# Patient Record
Sex: MALE | Race: AMERICAN INDIAN OR ALASKA NATIVE | NOT HISPANIC OR LATINO | URBAN - METROPOLITAN AREA
[De-identification: names, ages, dates, MRNs, and addresses within clinical notes are randomized per-mention and may not be internally consistent; named-entity substitution may affect disease eponyms.]

---

## 2021-05-03 ENCOUNTER — INPATIENT (INPATIENT)
Facility: HOSPITAL | Age: 86
LOS: 9 days | Discharge: NURSING, REHAB OR LONG-TERM CARE | End: 2021-05-13
Attending: STUDENT IN AN ORGANIZED HEALTH CARE EDUCATION/TRAINING PROGRAM | Admitting: STUDENT IN AN ORGANIZED HEALTH CARE EDUCATION/TRAINING PROGRAM
Payer: MEDICARE

## 2021-05-03 VITALS
RESPIRATION RATE: 17 BRPM | HEART RATE: 66 BPM | SYSTOLIC BLOOD PRESSURE: 173 MMHG | OXYGEN SATURATION: 98 % | DIASTOLIC BLOOD PRESSURE: 77 MMHG | TEMPERATURE: 98 F

## 2021-05-03 DIAGNOSIS — R13.10 DYSPHAGIA, UNSPECIFIED: ICD-10-CM
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
## 2021-05-03 DIAGNOSIS — I16.0 HYPERTENSIVE URGENCY: ICD-10-CM

## 2021-05-03 DIAGNOSIS — I68.0 CEREBRAL AMYLOID ANGIOPATHY: ICD-10-CM

## 2021-05-03 DIAGNOSIS — M10.9 GOUT, UNSPECIFIED: ICD-10-CM

## 2021-05-03 DIAGNOSIS — I82.412 ACUTE EMBOLISM AND THROMBOSIS OF LEFT FEMORAL VEIN: ICD-10-CM

## 2021-05-03 DIAGNOSIS — T45.615A ADVERSE EFFECT OF THROMBOLYTIC DRUGS, INITIAL ENCOUNTER: ICD-10-CM

## 2021-05-03 DIAGNOSIS — R47.01 APHASIA: ICD-10-CM

## 2021-05-03 DIAGNOSIS — I10 ESSENTIAL (PRIMARY) HYPERTENSION: ICD-10-CM

## 2021-05-03 DIAGNOSIS — E83.39 OTHER DISORDERS OF PHOSPHORUS METABOLISM: ICD-10-CM

## 2021-05-03 DIAGNOSIS — I82.422 ACUTE EMBOLISM AND THROMBOSIS OF LEFT ILIAC VEIN: ICD-10-CM

## 2021-05-03 DIAGNOSIS — E87.1 HYPO-OSMOLALITY AND HYPONATREMIA: ICD-10-CM

## 2021-05-03 DIAGNOSIS — I63.89 OTHER CEREBRAL INFARCTION: ICD-10-CM

## 2021-05-03 DIAGNOSIS — F05 DELIRIUM DUE TO KNOWN PHYSIOLOGICAL CONDITION: ICD-10-CM

## 2021-05-03 DIAGNOSIS — F43.20 ADJUSTMENT DISORDER, UNSPECIFIED: ICD-10-CM
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
## 2021-05-03 DIAGNOSIS — E83.41 HYPERMAGNESEMIA: ICD-10-CM

## 2021-05-03 DIAGNOSIS — E85.4 ORGAN-LIMITED AMYLOIDOSIS: ICD-10-CM

## 2021-05-03 DIAGNOSIS — I95.9 HYPOTENSION, UNSPECIFIED: ICD-10-CM

## 2021-05-03 DIAGNOSIS — I61.6 NONTRAUMATIC INTRACEREBRAL HEMORRHAGE, MULTIPLE LOCALIZED: ICD-10-CM

## 2021-05-03 DIAGNOSIS — R29.718 NIHSS SCORE 18: ICD-10-CM

## 2021-05-03 DIAGNOSIS — F32.9 MAJOR DEPRESSIVE DISORDER, SINGLE EPISODE, UNSPECIFIED: ICD-10-CM

## 2021-05-03 DIAGNOSIS — E87.0 HYPEROSMOLALITY AND HYPERNATREMIA: ICD-10-CM

## 2021-05-03 DIAGNOSIS — E87.6 HYPOKALEMIA: ICD-10-CM

## 2021-05-03 DIAGNOSIS — G81.91 HEMIPLEGIA, UNSPECIFIED AFFECTING RIGHT DOMINANT SIDE: ICD-10-CM

## 2021-05-03 LAB
ALBUMIN SERPL ELPH-MCNC: 4.2 G/DL — SIGNIFICANT CHANGE UP (ref 3.5–5.2)
ALP SERPL-CCNC: 85 U/L — SIGNIFICANT CHANGE UP (ref 30–115)
ALT FLD-CCNC: 10 U/L — SIGNIFICANT CHANGE UP (ref 0–41)
ANION GAP SERPL CALC-SCNC: 10 MMOL/L — SIGNIFICANT CHANGE UP (ref 7–14)
APTT BLD: 30 SEC — SIGNIFICANT CHANGE UP (ref 27–39.2)
AST SERPL-CCNC: 21 U/L — SIGNIFICANT CHANGE UP (ref 0–41)
BASOPHILS # BLD AUTO: 0.05 K/UL — SIGNIFICANT CHANGE UP (ref 0–0.2)
BASOPHILS NFR BLD AUTO: 0.7 % — SIGNIFICANT CHANGE UP (ref 0–1)
BILIRUB SERPL-MCNC: 0.3 MG/DL — SIGNIFICANT CHANGE UP (ref 0.2–1.2)
BLD GP AB SCN SERPL QL: SIGNIFICANT CHANGE UP
BUN SERPL-MCNC: 15 MG/DL — SIGNIFICANT CHANGE UP (ref 10–20)
CALCIUM SERPL-MCNC: 9.2 MG/DL — SIGNIFICANT CHANGE UP (ref 8.5–10.1)
CHLORIDE SERPL-SCNC: 102 MMOL/L — SIGNIFICANT CHANGE UP (ref 98–110)
CK MB CFR SERPL CALC: 4.1 NG/ML — SIGNIFICANT CHANGE UP (ref 0.6–6.3)
CO2 SERPL-SCNC: 23 MMOL/L — SIGNIFICANT CHANGE UP (ref 17–32)
CREAT SERPL-MCNC: 0.8 MG/DL — SIGNIFICANT CHANGE UP (ref 0.7–1.5)
EOSINOPHIL # BLD AUTO: 0.25 K/UL — SIGNIFICANT CHANGE UP (ref 0–0.7)
EOSINOPHIL NFR BLD AUTO: 3.6 % — SIGNIFICANT CHANGE UP (ref 0–8)
GLUCOSE SERPL-MCNC: 90 MG/DL — SIGNIFICANT CHANGE UP (ref 70–99)
HCT VFR BLD CALC: 36.9 % — LOW (ref 42–52)
HGB BLD-MCNC: 11.4 G/DL — LOW (ref 14–18)
IMM GRANULOCYTES NFR BLD AUTO: 0.3 % — SIGNIFICANT CHANGE UP (ref 0.1–0.3)
INR BLD: 0.95 RATIO — SIGNIFICANT CHANGE UP (ref 0.65–1.3)
LYMPHOCYTES # BLD AUTO: 1.75 K/UL — SIGNIFICANT CHANGE UP (ref 1.2–3.4)
LYMPHOCYTES # BLD AUTO: 25.4 % — SIGNIFICANT CHANGE UP (ref 20.5–51.1)
MCHC RBC-ENTMCNC: 25.1 PG — LOW (ref 27–31)
MCHC RBC-ENTMCNC: 30.9 G/DL — LOW (ref 32–37)
MCV RBC AUTO: 81.3 FL — SIGNIFICANT CHANGE UP (ref 80–94)
MONOCYTES # BLD AUTO: 0.48 K/UL — SIGNIFICANT CHANGE UP (ref 0.1–0.6)
MONOCYTES NFR BLD AUTO: 7 % — SIGNIFICANT CHANGE UP (ref 1.7–9.3)
NEUTROPHILS # BLD AUTO: 4.34 K/UL — SIGNIFICANT CHANGE UP (ref 1.4–6.5)
NEUTROPHILS NFR BLD AUTO: 63 % — SIGNIFICANT CHANGE UP (ref 42.2–75.2)
NRBC # BLD: 0 /100 WBCS — SIGNIFICANT CHANGE UP (ref 0–0)
PLATELET # BLD AUTO: 171 K/UL — SIGNIFICANT CHANGE UP (ref 130–400)
POTASSIUM SERPL-MCNC: 4.5 MMOL/L — SIGNIFICANT CHANGE UP (ref 3.5–5)
POTASSIUM SERPL-SCNC: 4.5 MMOL/L — SIGNIFICANT CHANGE UP (ref 3.5–5)
PROT SERPL-MCNC: 6.8 G/DL — SIGNIFICANT CHANGE UP (ref 6–8)
PROTHROM AB SERPL-ACNC: 10.9 SEC — SIGNIFICANT CHANGE UP (ref 9.95–12.87)
RBC # BLD: 4.54 M/UL — LOW (ref 4.7–6.1)
RBC # FLD: 15.2 % — HIGH (ref 11.5–14.5)
SARS-COV-2 RNA SPEC QL NAA+PROBE: SIGNIFICANT CHANGE UP
SODIUM SERPL-SCNC: 135 MMOL/L — SIGNIFICANT CHANGE UP (ref 135–146)
TROPONIN T SERPL-MCNC: <0.01 NG/ML — SIGNIFICANT CHANGE UP
WBC # BLD: 6.89 K/UL — SIGNIFICANT CHANGE UP (ref 4.8–10.8)
WBC # FLD AUTO: 6.89 K/UL — SIGNIFICANT CHANGE UP (ref 4.8–10.8)

## 2021-05-03 PROCEDURE — 93010 ELECTROCARDIOGRAM REPORT: CPT

## 2021-05-03 PROCEDURE — 71045 X-RAY EXAM CHEST 1 VIEW: CPT | Mod: 26

## 2021-05-03 PROCEDURE — 99222 1ST HOSP IP/OBS MODERATE 55: CPT

## 2021-05-03 PROCEDURE — 70450 CT HEAD/BRAIN W/O DYE: CPT | Mod: 26,MA,76

## 2021-05-03 PROCEDURE — 70496 CT ANGIOGRAPHY HEAD: CPT | Mod: 26,MA

## 2021-05-03 PROCEDURE — 99291 CRITICAL CARE FIRST HOUR: CPT | Mod: CS,GC

## 2021-05-03 PROCEDURE — 70498 CT ANGIOGRAPHY NECK: CPT | Mod: 26,MA

## 2021-05-03 PROCEDURE — 99291 CRITICAL CARE FIRST HOUR: CPT

## 2021-05-03 PROCEDURE — 0042T: CPT

## 2021-05-03 RX ORDER — NICARDIPINE HYDROCHLORIDE 30 MG/1
5 CAPSULE, EXTENDED RELEASE ORAL
Qty: 40 | Refills: 0 | Status: DISCONTINUED | OUTPATIENT
Start: 2021-05-03 | End: 2021-05-04

## 2021-05-03 RX ORDER — LABETALOL HCL 100 MG
20 TABLET ORAL ONCE
Refills: 0 | Status: COMPLETED | OUTPATIENT
Start: 2021-05-03 | End: 2021-05-03

## 2021-05-03 RX ORDER — NICARDIPINE HYDROCHLORIDE 30 MG/1
5 CAPSULE, EXTENDED RELEASE ORAL
Qty: 40 | Refills: 0 | Status: DISCONTINUED | OUTPATIENT
Start: 2021-05-03 | End: 2021-05-03

## 2021-05-03 RX ORDER — ALTEPLASE 100 MG
5.1 KIT INTRAVENOUS ONCE
Refills: 0 | Status: COMPLETED | OUTPATIENT
Start: 2021-05-03 | End: 2021-05-03

## 2021-05-03 RX ORDER — ALTEPLASE 100 MG
45.9 KIT INTRAVENOUS ONCE
Refills: 0 | Status: COMPLETED | OUTPATIENT
Start: 2021-05-03 | End: 2021-05-03

## 2021-05-03 RX ADMIN — ALTEPLASE 45.9 MILLIGRAM(S): KIT at 13:35

## 2021-05-03 RX ADMIN — Medication 20 MILLIGRAM(S): at 13:27

## 2021-05-03 RX ADMIN — NICARDIPINE HYDROCHLORIDE 25 MG/HR: 30 CAPSULE, EXTENDED RELEASE ORAL at 14:34

## 2021-05-03 RX ADMIN — NICARDIPINE HYDROCHLORIDE 25 MG/HR: 30 CAPSULE, EXTENDED RELEASE ORAL at 19:09

## 2021-05-03 RX ADMIN — ALTEPLASE 306 MILLIGRAM(S): KIT at 13:23

## 2021-05-03 NOTE — H&P ADULT - NSHPLABSRESULTS_GEN_ALL_CORE
LABS:                        11.4   6.89  )-----------( 171      ( 03 May 2021 13:12 )             36.9     05-03    135  |  102  |  15  ----------------------------<  90  4.5   |  23  |  0.8    Ca    9.2      03 May 2021 13:12    TPro  6.8  /  Alb  4.2  /  TBili  0.3  /  DBili  x   /  AST  21  /  ALT  10  /  AlkPhos  85  05-03    PT/INR - ( 03 May 2021 13:12 )   PT: 10.90 sec;   INR: 0.95 ratio         PTT - ( 03 May 2021 13:12 )  PTT:30.0 sec  < from: CT Angio Neck w/ IV Cont (05.03.21 @ 14:10) >    IMPRESSION:    1.  No evidence of acute large vessel occlusion. Normal perfusion images.    2.  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    3.  Moderate (about 60% stenosis) of the right ICA origin.    4.  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          < end of copied text >    RA< from: CT Brain Stroke Protocol (05.03.21 @ 13:17) >    IMPRESSION:    1.  No acute intracranial hemorrhage or acute large territorial infarct.    2.  Cerebral and cerebellar atrophy.    3.  Periventricular white matter hypodensities, nonspecific, differential diagnostic possibilities include ischemic change, gliosis or demyelination.      < end of copied text >

## 2021-05-03 NOTE — H&P ADULT - NSHPPHYSICALEXAM_GEN_ALL_CORE
PHYSICAL EXAM:  GENERAL: NAD, lying in bed comfortably  HEAD:  Atraumatic, Normocephalic  EYES: EOMI, PERRLA, conjunctiva and sclera clear  ENT: Moist mucous membranes  NECK: Supple, No JVD  CHEST/LUNG: Clear to auscultation bilaterally; No rales, rhonchi, wheezing, or rubs. Unlabored respirations  HEART: Regular rate and rhythm; No murmurs, rubs, or gallops  ABDOMEN: Bowel sounds present; Soft, Nontender, Nondistended. No hepatomegaly  NERVOUS SYSTEM:  Alert. Total aphasia, loss of sensation and motor power on right side ( face and extremities)

## 2021-05-03 NOTE — ED PROVIDER NOTE - OBJECTIVE STATEMENT
86M With PMH of gout presents as a stroke code BIBEMS. Patient is with granddaughter, is Gujar[place] speaking, which I am able to speak. As per patient and family, patient was well appearing till 11:30Am this morning, when it was noted that he had one sock on not the other and when trying to eat he would miss his mouth, AMS. Patient otherwise not answering questions in the hospital.

## 2021-05-03 NOTE — CONSULT NOTE ADULT - SUBJECTIVE AND OBJECTIVE BOX
Neurology Consult    Patient is a 86y old  Male who presents with a chief complaint of aphasia    HPI:  86 year old right handed gentleman presents to the ED with his granddaughter. Patient last known well at 1130. Patient found to be aphasic with right sided weakness at 12.   stroke code and code NI called on arrival. Patient with NIHSS 18. CTH without intracranial pathology. Risks and benfits of IV tpa given to granddaughter and she agreed to accept it. Patient given IV TPA.    PAST MEDICAL & SURGICAL HISTORY:  Gout        FAMILY HISTORY:      Social History: (-) x 3    Allergies    No Known Allergies    Intolerances        MEDICATIONS  (STANDING):  niCARdipine Infusion 5 mG/Hr (25 mL/Hr) IV Continuous <Continuous>    MEDICATIONS  (PRN):  Vital Signs Last 24 Hrs  T(C): 36.4 (03 May 2021 13:15), Max: 36.4 (03 May 2021 13:15)  T(F): 97.5 (03 May 2021 13:15), Max: 97.5 (03 May 2021 13:15)  HR: 86 (03 May 2021 14:01) (62 - 86)  BP: 157/71 (03 May 2021 14:01) (127/67 - 225/97)  BP(mean): --  RR: 17 (03 May 2021 13:15) (17 - 17)  SpO2: 98% (03 May 2021 13:15) (98% - 98%)    Examination:    NIH Stroke Scale:   · NIH Stroke Scale: LOC	(0) Alert; keenly responsive  · NIH Stroke Scale: LOC Question	(2) Answers neither question correctly  · NIH Stroke Scale: LOC Command	(2) Performs neither task correctly  · NIH Stroke Scale: Gaze	(0) Normal  · NIH Stroke Scale: Visual	(1) Partial hemianopia  · NIH Stroke Scale: Facial	(0) Normal symmetrical movements  · NIH Stroke Scale: Arm Left	(0) No drift; limb holds 90 (or 45) degrees for full 10 secs  · NIH Stroke Scale: Arm Right	(4) No movement  · NIH Stroke Scale: Leg Left	(0) No drift; leg holds 30 degree position for full 5 secs  · NIH Stroke Scale: Leg Right	(4) No movement  · NIH Stroke Scale: Ataxia	(0) Absent  · NIH Stroke Scale: Sensory	(2) Severe to total sensory loss; patient is not aware of being touched in the face, arm, and leg  · NIH Stroke Scale: Language	(3) Mute, global aphasia; no usable speech or auditory comprehension  · NIH Stroke Scale: Dysarthria	(0) Normal  · NIH Stroke Scale: Extinct Inattention	(0) No abnormality  · NIH Stroke Scale: Total	18     Pre-stroke mRS Score:  mRS Score	(0) No symptoms        Labs:   CBC Full  -  ( 03 May 2021 13:12 )  WBC Count : 6.89 K/uL  RBC Count : 4.54 M/uL  Hemoglobin : 11.4 g/dL  Hematocrit : 36.9 %  Platelet Count - Automated : 171 K/uL  Mean Cell Volume : 81.3 fL  Mean Cell Hemoglobin : 25.1 pg  Mean Cell Hemoglobin Concentration : 30.9 g/dL  Auto Neutrophil # : 4.34 K/uL  Auto Lymphocyte # : 1.75 K/uL  Auto Monocyte # : 0.48 K/uL  Auto Eosinophil # : 0.25 K/uL  Auto Basophil # : 0.05 K/uL  Auto Neutrophil % : 63.0 %  Auto Lymphocyte % : 25.4 %  Auto Monocyte % : 7.0 %  Auto Eosinophil % : 3.6 %  Auto Basophil % : 0.7 %    05-03    135  |  102  |  15  ----------------------------<  90  4.5   |  23  |  0.8    Ca    9.2      03 May 2021 13:12    TPro  6.8  /  Alb  4.2  /  TBili  0.3  /  DBili  x   /  AST  21  /  ALT  10  /  AlkPhos  85  05-03    LIVER FUNCTIONS - ( 03 May 2021 13:12 )  Alb: 4.2 g/dL / Pro: 6.8 g/dL / ALK PHOS: 85 U/L / ALT: 10 U/L / AST: 21 U/L / GGT: x           PT/INR - ( 03 May 2021 13:12 )   PT: 10.90 sec;   INR: 0.95 ratio         PTT - ( 03 May 2021 13:12 )  PTT:30.0 sec        Neuroimaging:  NCHCT: CT Head No Cont:   EXAM:  CT BRAIN        IMPRESSION:    In comparison with the prior CT scan of the brain performed earlier the same day:    No acute intracranial hemorrhage.    Stable examination.    < from: CT Angio Head w/ IV Cont (05.03.21 @ 14:10) >  IMPRESSION:    1.  No evidence of acute large vessel occlusion. Normal perfusion images.    2.  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    3.  Moderate (about 60% stenosis) of the right ICA origin.    4.  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      `< from: CT Perfusion w/ Maps w/ IV Cont (05.03.21 @ 13:56) >    IMPRESSION:    1.  No evidence of acute large vessel occlusion. Normal perfusion images.    2.  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    3.  Moderate (about 60% stenosis) of the right ICA origin.    4.  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        < end of copied text >

## 2021-05-03 NOTE — H&P ADULT - ATTENDING COMMENTS
Stroke sp tpa    - Neuro f/up  - repeat head in 24 h or sooner if any changes in neuro status  - BP control  - MICU

## 2021-05-03 NOTE — H&P ADULT - ASSESSMENT
# Acute ischemic stroke  - NIH score 18   - CTA and CTP showed no LVO  - s/p tpA on 13:23  -Off nicardipine drip  - Goal BP <180/105 mmHg  - No antiplatelet or antithrombotic med for 24 hrs  - MRI brain without luis alberto  - TTE  - Lipid panel and A1C  - Video EEG,Seizure precautions.    # Acute ischemic stroke  - NIH score 18   - CTA and CTP showed no LVO  - s/p tpA on 13:23  -Off nicardipine drip  - Goal BP <180/105 mmHg  - No antiplatelet or antithrombotic med for 24 hrs  - MRI brain without luis alberto  - TTE  - Lipid panel and A1C  - Video EEG,Seizure precautions.   - CT head in 24 hrs after tpA Impression      Acute ischemic stroke  - NIH score 18   - CTA and CTP showed no LVO  - s/p tpA on 13:23  -Off nicardipine drip  - Goal BP <180/105 mmHg  - No antiplatelet or antithrombotic med for 24 hrs  - MRI brain without luis alberto  - TTE  - Lipid panel and A1C  - Video EEG,Seizure precautions.   - CT head in 24 hrs after tpA

## 2021-05-03 NOTE — ED PROVIDER NOTE - ATTENDING CONTRIBUTION TO CARE
I personally evaluated patient. I agree with the findings and plan with all documentation on chart except as documented  in my note.    85 y/o M PMH Gout who presents to the ED with his granddaughter. Patient last known well at 1130 am and was brought in for aphasia with right sided weakness since 12pm. Stroke code and code NI called on arrival. No recent falls or trauma. Patient with NIHSS 18 with right sided mariama-neglect and hemiparesis. Patient rushed for CT head and IV t-PA prepared. CTH without intracranial pathology. Risks and benfits of IV tpa given to granddaughter with ED and Neuro team.  She agreed to accept it. Patient given IV TPA. Patient rushed back to CT scan for CTA/Perfusion studies. No large vessel occlusion found and patient therefore not a candidate for IA intervention.   Patient had transient hypotension and was treated with IV Labetalol and BP improved. Patient had multiple neuro exams and appears to be moving right side slightly (improved form nothing). ICU consulted and will admit to ICU for further work up, neuro checks, and proper treatment/resuscitation. ED work up reviewed. Family spoken to in detail about results and plan of care.  All questions addressed.  Results of ED work up discussed and they are aware patient requires admission for further treatment and work up.

## 2021-05-03 NOTE — ED PROVIDER NOTE - PHYSICAL EXAMINATION
VITALS: Reviewed  CONSTITUTIONAL: not responding appropriately to questions, Altered  SKIN: warm, dry, no rash  HEAD: normocephalic, atraumatic  EYES: PERRL, EOMI, no conjunctival erythema, sclera clear  ENT: patent airway, moist mucous membranes  NECK: supple, no masses  CV:  regular rate, regular rhythm, 2+ radial pulses bilaterally  RESP: no wheezes, no rales, no rhonchi, normal work of breathing  ABD: soft, nontender, nondistended, no rebound, no guarding  MSK: normal ROM, no cyanosis, no edema  NEURO: AAOx0, not following commands, responsive to pain, NIH stroke scale 18  PSYCH: cooperative, appropriate

## 2021-05-03 NOTE — ED ADULT TRIAGE NOTE - CODE STROKE DETAILS
EMS stroke notification received @ 13:01 for an 87 year old male w/ altered mental status, right arm and right leg weakness, aphasia. FSBS 117 in the field. Code stroke activated PTA.

## 2021-05-03 NOTE — ED PROVIDER NOTE - PROGRESS NOTE DETAILS
PP: Patient NI alert, given TPA, blood pressure elevated 225, given labetalol, repeat pressure now 155/69. PP: Patient admitted to ICU.

## 2021-05-03 NOTE — ED PROVIDER NOTE - CLINICAL SUMMARY MEDICAL DECISION MAKING FREE TEXT BOX
87 y/o M PMH Gout who presents to the ED with his granddaughter. Patient last known well at 1130 am and was brought in for aphasia with right sided weakness since 12pm. Stroke code and code NI called on arrival. No recent falls or trauma. Patient with NIHSS 18 with right sided mariama-neglect and hemiparesis. Patient rushed for CT head and IV t-PA prepared. CTH without intracranial pathology. Risks and benfits of IV tpa given to granddaughter with ED and Neuro team.  She agreed to accept it. Patient given IV TPA. Patient rushed back to CT scan for CTA/Perfusion studies. No large vessel occlusion found and patient therefore not a candidate for IA intervention.   Patient had transient hypotension and was treated with IV Labetalol and BP improved. Patient had multiple neuro exams and appears to be moving right side slightly (improved form nothing). ICU consulted and will admit to ICU for further work up, neuro checks, and proper treatment/resuscitation. ED work up reviewed. Family spoken to in detail about results and plan of care.  All questions addressed.  Results of ED work up discussed and they are aware patient requires admission for further treatment and work up.

## 2021-05-03 NOTE — H&P ADULT - HISTORY OF PRESENT ILLNESS
86 year old right handed gentleman with no significant PMH presented to the ED with his granddaughter. Patient last known well at 11:30 AM. Patient found to be aphasic with right sided weakness at around noon.   Stroke code and code NI called on arrival. Patient with NIHSS 18. CTH without intracranial pathology.  Patient given IV TPA at 13:23. He was hypertensive on presentation () and was given Labetalol 20 mg IVP and started on nicardipine drip (now off)  86 year old right handed gentleman with no significant PMH presented to the ED with his granddaughter. Patient last known well at 11:30 AM. Patient found to be aphasic with right sided weakness at around noon.   Stroke code and code NI called on arrival. Patient with NIHSS 18. CTH without intracranial pathology.  Patient given IV TPA at 13:23. He was hypertensive on presentation () and was given Labetalol 20 mg IVP and started on nicardipine drip (now off) called for MICU

## 2021-05-04 LAB
A1C WITH ESTIMATED AVERAGE GLUCOSE RESULT: 6.2 % — HIGH (ref 4–5.6)
ALBUMIN SERPL ELPH-MCNC: 4.3 G/DL — SIGNIFICANT CHANGE UP (ref 3.5–5.2)
ALP SERPL-CCNC: 86 U/L — SIGNIFICANT CHANGE UP (ref 30–115)
ALT FLD-CCNC: 10 U/L — SIGNIFICANT CHANGE UP (ref 0–41)
ANION GAP SERPL CALC-SCNC: 11 MMOL/L — SIGNIFICANT CHANGE UP (ref 7–14)
AST SERPL-CCNC: 23 U/L — SIGNIFICANT CHANGE UP (ref 0–41)
BASOPHILS # BLD AUTO: 0.03 K/UL — SIGNIFICANT CHANGE UP (ref 0–0.2)
BASOPHILS NFR BLD AUTO: 0.4 % — SIGNIFICANT CHANGE UP (ref 0–1)
BILIRUB SERPL-MCNC: 0.5 MG/DL — SIGNIFICANT CHANGE UP (ref 0.2–1.2)
BLD GP AB SCN SERPL QL: SIGNIFICANT CHANGE UP
BUN SERPL-MCNC: 16 MG/DL — SIGNIFICANT CHANGE UP (ref 10–20)
CALCIUM SERPL-MCNC: 9.5 MG/DL — SIGNIFICANT CHANGE UP (ref 8.5–10.1)
CHLORIDE SERPL-SCNC: 99 MMOL/L — SIGNIFICANT CHANGE UP (ref 98–110)
CHOLEST SERPL-MCNC: 195 MG/DL — SIGNIFICANT CHANGE UP
CO2 SERPL-SCNC: 24 MMOL/L — SIGNIFICANT CHANGE UP (ref 17–32)
COVID-19 SPIKE DOMAIN AB INTERP: POSITIVE
COVID-19 SPIKE DOMAIN ANTIBODY RESULT: >250 U/ML — HIGH
CREAT SERPL-MCNC: 0.9 MG/DL — SIGNIFICANT CHANGE UP (ref 0.7–1.5)
EOSINOPHIL # BLD AUTO: 0.03 K/UL — SIGNIFICANT CHANGE UP (ref 0–0.7)
EOSINOPHIL NFR BLD AUTO: 0.4 % — SIGNIFICANT CHANGE UP (ref 0–8)
ESTIMATED AVERAGE GLUCOSE: 131 MG/DL — HIGH (ref 68–114)
GLUCOSE SERPL-MCNC: 111 MG/DL — HIGH (ref 70–99)
HCT VFR BLD CALC: 37.9 % — LOW (ref 42–52)
HDLC SERPL-MCNC: 48 MG/DL — SIGNIFICANT CHANGE UP
HGB BLD-MCNC: 12 G/DL — LOW (ref 14–18)
IMM GRANULOCYTES NFR BLD AUTO: 0.3 % — SIGNIFICANT CHANGE UP (ref 0.1–0.3)
LIPID PNL WITH DIRECT LDL SERPL: 133 MG/DL — HIGH
LYMPHOCYTES # BLD AUTO: 1.43 K/UL — SIGNIFICANT CHANGE UP (ref 1.2–3.4)
LYMPHOCYTES # BLD AUTO: 17.9 % — LOW (ref 20.5–51.1)
MCHC RBC-ENTMCNC: 25.3 PG — LOW (ref 27–31)
MCHC RBC-ENTMCNC: 31.7 G/DL — LOW (ref 32–37)
MCV RBC AUTO: 80 FL — SIGNIFICANT CHANGE UP (ref 80–94)
MONOCYTES # BLD AUTO: 0.55 K/UL — SIGNIFICANT CHANGE UP (ref 0.1–0.6)
MONOCYTES NFR BLD AUTO: 6.9 % — SIGNIFICANT CHANGE UP (ref 1.7–9.3)
NEUTROPHILS # BLD AUTO: 5.93 K/UL — SIGNIFICANT CHANGE UP (ref 1.4–6.5)
NEUTROPHILS NFR BLD AUTO: 74.1 % — SIGNIFICANT CHANGE UP (ref 42.2–75.2)
NON HDL CHOLESTEROL: 147 MG/DL — HIGH
NRBC # BLD: 0 /100 WBCS — SIGNIFICANT CHANGE UP (ref 0–0)
PLATELET # BLD AUTO: 181 K/UL — SIGNIFICANT CHANGE UP (ref 130–400)
POTASSIUM SERPL-MCNC: 4.6 MMOL/L — SIGNIFICANT CHANGE UP (ref 3.5–5)
POTASSIUM SERPL-SCNC: 4.6 MMOL/L — SIGNIFICANT CHANGE UP (ref 3.5–5)
PROT SERPL-MCNC: 7 G/DL — SIGNIFICANT CHANGE UP (ref 6–8)
RBC # BLD: 4.74 M/UL — SIGNIFICANT CHANGE UP (ref 4.7–6.1)
RBC # FLD: 15 % — HIGH (ref 11.5–14.5)
SARS-COV-2 IGG+IGM SERPL QL IA: >250 U/ML — HIGH
SARS-COV-2 IGG+IGM SERPL QL IA: POSITIVE
SODIUM SERPL-SCNC: 134 MMOL/L — LOW (ref 135–146)
TRIGL SERPL-MCNC: 129 MG/DL — SIGNIFICANT CHANGE UP
WBC # BLD: 7.99 K/UL — SIGNIFICANT CHANGE UP (ref 4.8–10.8)
WBC # FLD AUTO: 7.99 K/UL — SIGNIFICANT CHANGE UP (ref 4.8–10.8)

## 2021-05-04 PROCEDURE — 70553 MRI BRAIN STEM W/O & W/DYE: CPT | Mod: 26

## 2021-05-04 PROCEDURE — 99232 SBSQ HOSP IP/OBS MODERATE 35: CPT

## 2021-05-04 PROCEDURE — 70450 CT HEAD/BRAIN W/O DYE: CPT | Mod: 26,77

## 2021-05-04 PROCEDURE — 93306 TTE W/DOPPLER COMPLETE: CPT | Mod: 26

## 2021-05-04 PROCEDURE — 99291 CRITICAL CARE FIRST HOUR: CPT

## 2021-05-04 PROCEDURE — 95816 EEG AWAKE AND DROWSY: CPT | Mod: 26

## 2021-05-04 PROCEDURE — 70450 CT HEAD/BRAIN W/O DYE: CPT | Mod: 26

## 2021-05-04 RX ORDER — LABETALOL HCL 100 MG
10 TABLET ORAL ONCE
Refills: 0 | Status: COMPLETED | OUTPATIENT
Start: 2021-05-04 | End: 2021-05-04

## 2021-05-04 RX ORDER — SODIUM CHLORIDE 9 MG/ML
500 INJECTION INTRAMUSCULAR; INTRAVENOUS; SUBCUTANEOUS ONCE
Refills: 0 | Status: COMPLETED | OUTPATIENT
Start: 2021-05-04 | End: 2021-05-04

## 2021-05-04 RX ORDER — LEVETIRACETAM 250 MG/1
500 TABLET, FILM COATED ORAL EVERY 12 HOURS
Refills: 0 | Status: DISCONTINUED | OUTPATIENT
Start: 2021-05-04 | End: 2021-05-12

## 2021-05-04 RX ORDER — PANTOPRAZOLE SODIUM 20 MG/1
40 TABLET, DELAYED RELEASE ORAL ONCE
Refills: 0 | Status: COMPLETED | OUTPATIENT
Start: 2021-05-04 | End: 2021-05-04

## 2021-05-04 RX ORDER — CHLORHEXIDINE GLUCONATE 213 G/1000ML
1 SOLUTION TOPICAL
Refills: 0 | Status: DISCONTINUED | OUTPATIENT
Start: 2021-05-04 | End: 2021-05-12

## 2021-05-04 RX ORDER — SODIUM CHLORIDE 9 MG/ML
1000 INJECTION INTRAMUSCULAR; INTRAVENOUS; SUBCUTANEOUS
Refills: 0 | Status: DISCONTINUED | OUTPATIENT
Start: 2021-05-04 | End: 2021-05-05

## 2021-05-04 RX ORDER — PANTOPRAZOLE SODIUM 20 MG/1
40 TABLET, DELAYED RELEASE ORAL DAILY
Refills: 0 | Status: DISCONTINUED | OUTPATIENT
Start: 2021-05-05 | End: 2021-05-08

## 2021-05-04 RX ORDER — NICARDIPINE HYDROCHLORIDE 30 MG/1
5 CAPSULE, EXTENDED RELEASE ORAL
Qty: 40 | Refills: 0 | Status: DISCONTINUED | OUTPATIENT
Start: 2021-05-04 | End: 2021-05-09

## 2021-05-04 RX ADMIN — SODIUM CHLORIDE 500 MILLILITER(S): 9 INJECTION INTRAMUSCULAR; INTRAVENOUS; SUBCUTANEOUS at 15:46

## 2021-05-04 RX ADMIN — NICARDIPINE HYDROCHLORIDE 25 MG/HR: 30 CAPSULE, EXTENDED RELEASE ORAL at 21:18

## 2021-05-04 RX ADMIN — Medication 10 MILLIGRAM(S): at 12:19

## 2021-05-04 RX ADMIN — SODIUM CHLORIDE 75 MILLILITER(S): 9 INJECTION INTRAMUSCULAR; INTRAVENOUS; SUBCUTANEOUS at 22:51

## 2021-05-04 RX ADMIN — SODIUM CHLORIDE 75 MILLILITER(S): 9 INJECTION INTRAMUSCULAR; INTRAVENOUS; SUBCUTANEOUS at 21:18

## 2021-05-04 RX ADMIN — LEVETIRACETAM 420 MILLIGRAM(S): 250 TABLET, FILM COATED ORAL at 18:04

## 2021-05-04 RX ADMIN — SODIUM CHLORIDE 500 MILLILITER(S): 9 INJECTION INTRAMUSCULAR; INTRAVENOUS; SUBCUTANEOUS at 12:19

## 2021-05-04 RX ADMIN — SODIUM CHLORIDE 2000 MILLILITER(S): 9 INJECTION INTRAMUSCULAR; INTRAVENOUS; SUBCUTANEOUS at 22:52

## 2021-05-04 NOTE — PROGRESS NOTE ADULT - SUBJECTIVE AND OBJECTIVE BOX
OVERNIGHT EVENTS: events noted, still aphasic, no drips    Vital Signs Last 24 Hrs  T(C): 36.7 (04 May 2021 08:00), Max: 36.7 (04 May 2021 08:00)  T(F): 98 (04 May 2021 08:00), Max: 98 (04 May 2021 08:00)  HR: 74 (04 May 2021 08:00) (62 - 92)  BP: 177/73 (04 May 2021 06:00) (127/64 - 225/97)  BP(mean): 100 (04 May 2021 06:00) (74 - 108)  RR: 30 (04 May 2021 08:00) (17 - 50)  SpO2: 99% (04 May 2021 08:00) (98% - 100%)    PHYSICAL EXAMINATION:    GENERAL: ill looking    HEENT: Head is normocephalic and atraumatic.     NECK: Supple.    LUNGS: Clear to auscultation without wheezing, rales or rhonchi; respirations unlabored    HEART: Regular rate and rhythm without murmur.    ABDOMEN: Soft, nontender, and nondistended.      EXTREMITIES: Without any cyanosis, clubbing, rash, lesions or edema.    NEUROLOGIC aphasic, non focal  SKIN: No ulceration or induration present.      LABS:                        12.0   7.99  )-----------( 181      ( 04 May 2021 04:45 )             37.9     05-04    134<L>  |  99  |  16  ----------------------------<  111<H>  4.6   |  24  |  0.9    Ca    9.5      04 May 2021 04:45    TPro  7.0  /  Alb  4.3  /  TBili  0.5  /  DBili  x   /  AST  23  /  ALT  10  /  AlkPhos  86  05-04    PT/INR - ( 03 May 2021 13:12 )   PT: 10.90 sec;   INR: 0.95 ratio         PTT - ( 03 May 2021 13:12 )  PTT:30.0 sec      CARDIAC MARKERS ( 03 May 2021 13:12 )  x     / <0.01 ng/mL / x     / x     / 4.1 ng/mL                  05-03-21 @ 07:01  -  05-04-21 @ 07:00  --------------------------------------------------------  IN: 22.5 mL / OUT: 2950 mL / NET: -2927.5 mL        MICROBIOLOGY:      MEDICATIONS  (STANDING):  chlorhexidine 4% Liquid 1 Application(s) Topical <User Schedule>  niCARdipine Infusion 5 mG/Hr (25 mL/Hr) IV Continuous <Continuous>    MEDICATIONS  (PRN):      RADIOLOGY & ADDITIONAL STUDIES:

## 2021-05-04 NOTE — PROGRESS NOTE ADULT - SUBJECTIVE AND OBJECTIVE BOX
Specialty: Neuro Critical Care     Interval Hx: No acute events overnight. pt remains aphasic.      HPI:  86 year old right handed gentleman with no significant PMH presented to the ED with his granddaughter. Patient last known well at 11:30 AM. Patient found to be aphasic with right sided weakness at around noon.   Stroke code and code NI called on arrival. Patient with NIHSS 18. CTH without intracranial pathology.  Patient given IV TPA at 13:23. He was hypertensive on presentation () and was given Labetalol 20 mg IVP and started on nicardipine drip (now off) called for MICU  (03 May 2021 16:17)      Past Medical and Surgical Hx:  PAST MEDICAL & SURGICAL HISTORY:  Gout  not on treatment, no recent flares    No significant past surgical history        Family Hx:  FAMILY HISTORY:  FHx: stroke (Sibling)        Allergies: No Known Allergies      ROS:   Patient unable to participate in ROS due to neurologic status      Medications Current and PRN:  MEDICATIONS  (STANDING):  chlorhexidine 4% Liquid 1 Application(s) Topical <User Schedule>  sodium chloride 0.9% Bolus 500 milliLiter(s) IV Bolus once  sodium chloride 0.9%. 1000 milliLiter(s) (75 mL/Hr) IV Continuous <Continuous>    MEDICATIONS  (PRN):      Vital Signs:  ICU Vital Signs Last 24 Hrs  T(C): 36.7 (04 May 2021 08:00), Max: 36.7 (04 May 2021 08:00)  T(F): 98 (04 May 2021 08:00), Max: 98 (04 May 2021 08:00)  HR: 62 (04 May 2021 11:00) (62 - 92)  BP: 188/79 (04 May 2021 11:00) (127/64 - 225/97)  BP(mean): 102 (04 May 2021 11:00) (74 - 108)  ABP: --  ABP(mean): --  RR: 25 (04 May 2021 11:00) (17 - 50)  SpO2: 100% (04 May 2021 11:00) (98% - 100%)      I/Os:  I&O's Summary    03 May 2021 07:01  -  04 May 2021 07:00  --------------------------------------------------------  IN: 22.5 mL / OUT: 2950 mL / NET: -2927.5 mL    04 May 2021 07:01  -  04 May 2021 11:45  --------------------------------------------------------  IN: 725 mL / OUT: 245 mL / NET: 480 mL      PE:  Gen: WN/WD male sitting upright in bed, in NAD  Mental status: Awake, alert. Global aphasia. Does not follow commands  Cranial nerves: PERRL, no nystagmus, L gaze preference. (+) blink to threat. Face grossly symmetric.  Motor:  Moves b/l UEs antigravity, slowly lowers to bed. Moves b/l LEs within the plane of the bed  Sensation: Grimaces to noxious stimuli x4 extremities      Labs:  05-04    134<L>  |  99  |  16  ----------------------------<  111<H>  4.6   |  24  |  0.9    Ca    9.5      04 May 2021 04:45    TPro  7.0  /  Alb  4.3  /  TBili  0.5  /  DBili  x   /  AST  23  /  ALT  10  /  AlkPhos  86  05-04                          12.0   7.99  )-----------( 181      ( 04 May 2021 04:45 )             37.9     PT/INR - ( 03 May 2021 13:12 )   PT: 10.90 sec;   INR: 0.95 ratio         PTT - ( 03 May 2021 13:12 )  PTT:30.0 sec  LIVER FUNCTIONS - ( 04 May 2021 04:45 )  Alb: 4.3 g/dL / Pro: 7.0 g/dL / ALK PHOS: 86 U/L / ALT: 10 U/L / AST: 23 U/L / GGT: x             Lipid Profile in AM (05.04.21 @ 04:45)   Cholesterol, Serum: 195 mg/dL   Triglycerides, Serum: 129 mg/dL   HDL Cholesterol, Serum: 48 mg/dL   Non HDL Cholesterol: 147 mg/dL   LDL Cholesterol Calculated: 133 mg/dL     A1C with Estimated Average Glucose in AM (05.04.21 @ 04:30)   A1C with Estimated Average Glucose Result: 6.2      Radiology:  CT Head No Cont:   EXAM:  CT BRAIN            PROCEDURE DATE:  05/03/2021        INTERPRETATION:  Clinical History / Reason for exam: Status post TPA, hypertension.    CT SCAN OF THE BRAIN WITHOUT CONTRAST    TECHNIQUE:    Multiple transaxial noncontrast CT images of the brain were obtained from base to vertex. Sagittal and coronal reformatted images were obtained.    COMPARISON:    Noncontrast CT scan of the brain dated May 3, 2021 time 1:13 PM.    FINDINGS:    The third and lateral ventricles are mildly enlarged as are the cortical sulci consistent with a mild degree of cortical atrophy.  The fourth ventricle is normal in size and position.    There is no shift of the midline structures, evidence of acute intracranial hemorrhage, or depressed skull fracture.    Minimal widening of the cerebellopontine angle cisterns and minimal prominence of the cerebellar folia consistent with a minimal degree of cerebellar atrophy.    Patchy foci of diminished attenuation in the periventricular white matter. Thesefindings are nonspecific in appearance and differential diagnostic possibilities include ischemic change of undetermined age, foci of gliosis or demyelination.    Vascular calcifications are noted.    Sclerosis of the left mastoid tip consistent withchronic inflammatory changes.    Mucosal thickening in the frontal and ethmoid sinuses.    Right medial scleral calcification.    IMPRESSION:    In comparison with the prior CT scan of the brain performed earlier the same day:    No acute intracranial hemorrhage.    Stable examination.    Spoke with TIO MCNEILL NP on 5/3/2021 1:52 PM with readback      MADELEINE OCAMPO MD; Attending Radiologist  This document has been electronically signed. May  3 2021  1:58PM (05-03-21 @ 13:49)        CT Perfusion w/ Maps w/ IV Cont:   EXAM:  CT ANGIO BRAIN (W)AW IC        EXAM:  CT ANGIO NECK (W)AW IC        EXAM:  CT PERFUSION W MAPS IC          PROCEDURE DATE:  05/03/2021        INTERPRETATION:  Clinical History / Reason for exam: Stroke code. Right-sided weakness.    Technique: CT angiogram of the head and neck including perfusion study of the brain.  Contiguous CT axial images of the head and neck were obtained following the bolus intravenous administration of 120 cc Optiray with multiple 3-D and MIP reformats withperfusion mapping performed on a separate 3D workstation (RAPID).    Correlation made with accompanying noncontrast head CT.    Findings:    CTA neck:  The visualized aortic arch and great vessel origins demonstrated extensive calcific plaque withoutsignificant stenosis.    The right common carotid artery is patent. There is calcific plaque at the right carotid bifurcation with about 60% stenosis of the carotid bulb. The ECA origin is patent.    The left common carotid artery is patent. There iscalcific plaque at the left ICA origin without significant stenosis.    There are moderate to severe stenoses of bilateral vertebral artery origins. The vertebral arteries are otherwise patent throughout their cervical courses, dominant on the right.    CTA brain: There is calcific plaque of the carotid siphons with mild stenosis on the right and moderate stenosis on the left. The anterior and middle cerebral arteries are patent.    The distal right vertebral artery and demonstrates mild stenoses. The left vertebral artery is diminutive beyond the PICA origin. The basilar artery is patent with a slightly bulbous configuration of the tip without discrete saccular aneurysm. The posterior cerebral arteries are patent.    The venous sinuses are patent.    Perfusion: Perfusion images are normal.    Other: Cervical spine degenerative changes. Subcentimeter bilateral thyroid nodules. Scattered dental caries and periapical lucencies    IMPRESSION:    1.  No evidence of acute large vessel occlusion. Normal perfusion images.    2.  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    3.  Moderate (about 60% stenosis) of the right ICA origin.    4.  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        MARIA LUISA JARQUIN MD; Attending Radiologist  This document has been electronically signed. May  3 2021  2:17PM (05-03-21 @ 13:56)   Specialty: Neuro Critical Care     Interval Hx: No acute events overnight. pt remains aphasic.      HPI:  86 year old right handed gentleman with no significant PMH presented to the ED with his granddaughter. Patient last known well at 11:30 AM. Patient found to be aphasic with right sided weakness at around noon.   Stroke code and code NI called on arrival. Patient with NIHSS 18. CTH without intracranial pathology.  Patient given IV TPA at 13:23. He was hypertensive on presentation () and was given Labetalol 20 mg IVP and started on nicardipine drip (now off) called for MICU  (03 May 2021 16:17)      Past Medical and Surgical Hx:  PAST MEDICAL & SURGICAL HISTORY:  Gout  not on treatment, no recent flares    No significant past surgical history        Family Hx:  FAMILY HISTORY:  FHx: stroke (Sibling)        Allergies: No Known Allergies      ROS:   Patient unable to participate in ROS due to neurologic status      Medications Current and PRN:  MEDICATIONS  (STANDING):  chlorhexidine 4% Liquid 1 Application(s) Topical <User Schedule>  sodium chloride 0.9% Bolus 500 milliLiter(s) IV Bolus once  sodium chloride 0.9%. 1000 milliLiter(s) (75 mL/Hr) IV Continuous <Continuous>    MEDICATIONS  (PRN):      Vital Signs:  ICU Vital Signs Last 24 Hrs  T(C): 36.7 (04 May 2021 08:00), Max: 36.7 (04 May 2021 08:00)  T(F): 98 (04 May 2021 08:00), Max: 98 (04 May 2021 08:00)  HR: 62 (04 May 2021 11:00) (62 - 92)  BP: 188/79 (04 May 2021 11:00) (127/64 - 225/97)  BP(mean): 102 (04 May 2021 11:00) (74 - 108)  ABP: --  ABP(mean): --  RR: 25 (04 May 2021 11:00) (17 - 50)  SpO2: 100% (04 May 2021 11:00) (98% - 100%)      I/Os:  I&O's Summary    03 May 2021 07:01  -  04 May 2021 07:00  --------------------------------------------------------  IN: 22.5 mL / OUT: 2950 mL / NET: -2927.5 mL    04 May 2021 07:01  -  04 May 2021 11:45  --------------------------------------------------------  IN: 725 mL / OUT: 245 mL / NET: 480 mL      PE:  Gen: WN/WD male sitting upright in bed, in NAD  Mental status: Awake, alert. Global aphasia. Does not follow commands  Cranial nerves: PERRL, no nystagmus, L gaze preference. (+) blink to threat. Face grossly symmetric.  Motor:  Moves b/l UEs antigravity, slowly lowers to bed. Moves b/l LEs antigravity  Sensation: Grimaces to noxious stimuli x4 extremities      Labs:  05-04    134<L>  |  99  |  16  ----------------------------<  111<H>  4.6   |  24  |  0.9    Ca    9.5      04 May 2021 04:45    TPro  7.0  /  Alb  4.3  /  TBili  0.5  /  DBili  x   /  AST  23  /  ALT  10  /  AlkPhos  86  05-04                          12.0   7.99  )-----------( 181      ( 04 May 2021 04:45 )             37.9     PT/INR - ( 03 May 2021 13:12 )   PT: 10.90 sec;   INR: 0.95 ratio         PTT - ( 03 May 2021 13:12 )  PTT:30.0 sec  LIVER FUNCTIONS - ( 04 May 2021 04:45 )  Alb: 4.3 g/dL / Pro: 7.0 g/dL / ALK PHOS: 86 U/L / ALT: 10 U/L / AST: 23 U/L / GGT: x             Lipid Profile in AM (05.04.21 @ 04:45)   Cholesterol, Serum: 195 mg/dL   Triglycerides, Serum: 129 mg/dL   HDL Cholesterol, Serum: 48 mg/dL   Non HDL Cholesterol: 147 mg/dL   LDL Cholesterol Calculated: 133 mg/dL     A1C with Estimated Average Glucose in AM (05.04.21 @ 04:30)   A1C with Estimated Average Glucose Result: 6.2      Radiology:  CT Head No Cont:   EXAM:  CT BRAIN            PROCEDURE DATE:  05/03/2021        INTERPRETATION:  Clinical History / Reason for exam: Status post TPA, hypertension.    CT SCAN OF THE BRAIN WITHOUT CONTRAST    TECHNIQUE:    Multiple transaxial noncontrast CT images of the brain were obtained from base to vertex. Sagittal and coronal reformatted images were obtained.    COMPARISON:    Noncontrast CT scan of the brain dated May 3, 2021 time 1:13 PM.    FINDINGS:    The third and lateral ventricles are mildly enlarged as are the cortical sulci consistent with a mild degree of cortical atrophy.  The fourth ventricle is normal in size and position.    There is no shift of the midline structures, evidence of acute intracranial hemorrhage, or depressed skull fracture.    Minimal widening of the cerebellopontine angle cisterns and minimal prominence of the cerebellar folia consistent with a minimal degree of cerebellar atrophy.    Patchy foci of diminished attenuation in the periventricular white matter. Thesefindings are nonspecific in appearance and differential diagnostic possibilities include ischemic change of undetermined age, foci of gliosis or demyelination.    Vascular calcifications are noted.    Sclerosis of the left mastoid tip consistent withchronic inflammatory changes.    Mucosal thickening in the frontal and ethmoid sinuses.    Right medial scleral calcification.    IMPRESSION:    In comparison with the prior CT scan of the brain performed earlier the same day:    No acute intracranial hemorrhage.    Stable examination.    Spoke with TIO MCNEILL NP on 5/3/2021 1:52 PM with readback      MADELEINE OCAMPO MD; Attending Radiologist  This document has been electronically signed. May  3 2021  1:58PM (05-03-21 @ 13:49)        CT Perfusion w/ Maps w/ IV Cont:   EXAM:  CT ANGIO BRAIN (W)AW IC        EXAM:  CT ANGIO NECK (W)AW IC        EXAM:  CT PERFUSION W MAPS IC          PROCEDURE DATE:  05/03/2021        INTERPRETATION:  Clinical History / Reason for exam: Stroke code. Right-sided weakness.    Technique: CT angiogram of the head and neck including perfusion study of the brain.  Contiguous CT axial images of the head and neck were obtained following the bolus intravenous administration of 120 cc Optiray with multiple 3-D and MIP reformats withperfusion mapping performed on a separate 3D workstation (RAPID).    Correlation made with accompanying noncontrast head CT.    Findings:    CTA neck:  The visualized aortic arch and great vessel origins demonstrated extensive calcific plaque withoutsignificant stenosis.    The right common carotid artery is patent. There is calcific plaque at the right carotid bifurcation with about 60% stenosis of the carotid bulb. The ECA origin is patent.    The left common carotid artery is patent. There iscalcific plaque at the left ICA origin without significant stenosis.    There are moderate to severe stenoses of bilateral vertebral artery origins. The vertebral arteries are otherwise patent throughout their cervical courses, dominant on the right.    CTA brain: There is calcific plaque of the carotid siphons with mild stenosis on the right and moderate stenosis on the left. The anterior and middle cerebral arteries are patent.    The distal right vertebral artery and demonstrates mild stenoses. The left vertebral artery is diminutive beyond the PICA origin. The basilar artery is patent with a slightly bulbous configuration of the tip without discrete saccular aneurysm. The posterior cerebral arteries are patent.    The venous sinuses are patent.    Perfusion: Perfusion images are normal.    Other: Cervical spine degenerative changes. Subcentimeter bilateral thyroid nodules. Scattered dental caries and periapical lucencies    IMPRESSION:    1.  No evidence of acute large vessel occlusion. Normal perfusion images.    2.  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    3.  Moderate (about 60% stenosis) of the right ICA origin.    4.  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        MARIA LUISA JARQUIN MD; Attending Radiologist  This document has been electronically signed. May  3 2021  2:17PM (05-03-21 @ 13:56)

## 2021-05-04 NOTE — CONSULT NOTE ADULT - SUBJECTIVE AND OBJECTIVE BOX
HPI:  86 year old right handed gentleman with no significant PMH presented to the ED with his granddaughter. Patient last known well at 11:30 AM. Patient found to be aphasic with right sided weakness at around noon.   Stroke code and code NI called on arrival. Patient with NIHSS 18. CTH without intracranial pathology.  Patient given IV TPA at 13:23. He was hypertensive on presentation () and was given Labetalol 20 mg IVP and started on nicardipine drip (now off) called for MICU  (03 May 2021 16:17). Pt had MRI Brain which showed < from: MR Head w/wo IV Cont (05.04.21 @ 14:34) >  New large bifrontal lobar hemorrhage with hematocrit level, right larger than left, since the prior CT from 5/3/2021. Additional smaller focus of hemorrhage in the right occipital lobe. Also noted are multiple scattered punctate susceptibility signals in the bilateral cerebral hemispheres. These constellations of findings are suspicious for lobar hemorrhage associated with amyloid angiopathy. Associated moderate surrounding edema and mass effect, with leftward midline shift measuring 0.4 cm.    Small foci of acute infarct in the left temporal and right parietal lobes.    Linear signal abnormality and enhancement along the right superior cerebral sulcus and precentral sulci could reflect subarachnoid hemorrhage.    < end of copied text >    rpt HCT showed     < from: CT Head No Cont (05.04.21 @ 15:40) >  Redemonstration of large hyperdense lobar hemorrhage in the bifrontal lobes, right larger than left, as well as smaller foci of hemorrhages in the right occipital and left temporal lobes. There is moderate mass effect resulting in 0.4 cm anterior midline shift to the left. On the same day MRI of the brain, there are findings suggestive of amyloid angiopathy.    Mild scattered bilateral subarachnoid hemorrhages, predominantly in the superior right frontal sulcus.    Small acute infarcts in the left temporal and right parietal lobes seen on the same day MRI are not well appreciated on the CT.    < end of copied text >      PAST MEDICAL & SURGICAL HISTORY:  Gout  not on treatment, no recent flares    No significant past surgical history    Home Medications:    Allergies    No Known Allergies    Intolerances        ROS:  [  ] A ten-point review of systems is negative except as noted   [X] Due to altered mental status/intubation, subjective information were not able to be obtained from the patient. History was obtained, to the extent possible, from review of the chart and collateral sources of information    MEDICATIONS  (STANDING):  chlorhexidine 4% Liquid 1 Application(s) Topical <User Schedule>  niCARdipine Infusion 5 mG/Hr (25 mL/Hr) IV Continuous <Continuous>  sodium chloride 0.9% Bolus 500 milliLiter(s) IV Bolus once  sodium chloride 0.9%. 1000 milliLiter(s) (75 mL/Hr) IV Continuous <Continuous>    MEDICATIONS  (PRN):      ICU Vital Signs Last 24 Hrs  T(C): 37.2 (04 May 2021 16:00), Max: 37.2 (04 May 2021 16:00)  T(F): 98.9 (04 May 2021 16:00), Max: 98.9 (04 May 2021 16:00)  HR: 76 (04 May 2021 16:30) (52 - 92)  BP: 153/61 (04 May 2021 16:30) (127/64 - 208/78)  BP(mean): 92 (04 May 2021 16:30) (74 - 115)  ABP: --  ABP(mean): --  RR: 26 (04 May 2021 16:30) (18 - 50)  SpO2: 100% (04 May 2021 16:30) (94% - 100%)      I&O's Detail    03 May 2021 07:01  -  04 May 2021 07:00  --------------------------------------------------------  IN:    NiCARdipine: 22.5 mL  Total IN: 22.5 mL    OUT:    Indwelling Catheter - Urethral (mL): 2950 mL  Total OUT: 2950 mL    Total NET: -2927.5 mL      04 May 2021 07:01  -  04 May 2021 17:02  --------------------------------------------------------  IN:    NiCARdipine: 100 mL    sodium chloride 0.9%: 600 mL    Sodium Chloride 0.9% Bolus: 1000 mL  Total IN: 1700 mL    OUT:    Indwelling Catheter - Urethral (mL): 680 mL  Total OUT: 680 mL    Total NET: 1020 mL          CBC Full  -  ( 04 May 2021 04:45 )  WBC Count : 7.99 K/uL  RBC Count : 4.74 M/uL  Hemoglobin : 12.0 g/dL  Hematocrit : 37.9 %  Platelet Count - Automated : 181 K/uL  Mean Cell Volume : 80.0 fL  Mean Cell Hemoglobin : 25.3 pg  Mean Cell Hemoglobin Concentration : 31.7 g/dL  Auto Neutrophil # : 5.93 K/uL  Auto Lymphocyte # : 1.43 K/uL  Auto Monocyte # : 0.55 K/uL  Auto Eosinophil # : 0.03 K/uL  Auto Basophil # : 0.03 K/uL  Auto Neutrophil % : 74.1 %  Auto Lymphocyte % : 17.9 %  Auto Monocyte % : 6.9 %  Auto Eosinophil % : 0.4 %  Auto Basophil % : 0.4 %    05-04    134<L>  |  99  |  16  ----------------------------<  111<H>  4.6   |  24  |  0.9    Ca    9.5      04 May 2021 04:45    TPro  7.0  /  Alb  4.3  /  TBili  0.5  /  DBili  x   /  AST  23  /  ALT  10  /  AlkPhos  86  05-04    CARDIAC MARKERS ( 03 May 2021 13:12 )  x     / <0.01 ng/mL / x     / x     / 4.1 ng/mL          On PE:    Sleepy but arousable - then awake and alert  PERRL  Left gaze preference  ? droop  Some verbal response to questions - pt does not speak English  Moves B/L LE spontaneously  Extends RUE  Moves LUE purposefully - squeezed hand and showed thumb to command      Assessment:  Acute Bifrontal ICH  Acute Left Temporal and Right Parietal infarcts     Plan:  No Neurosurgical Intervention at this time  Continue Neuro checks q 1hr  Will follow  Repeat HCT in 6 hours  D/W Attending HPI:  86 year old right handed gentleman with no significant PMH presented to the ED with his granddaughter. Patient last known well at 11:30 AM. Patient found to be aphasic with right sided weakness at around noon.   Stroke code and code NI called on arrival. Patient with NIHSS 18. CTH without intracranial pathology.  Patient given IV TPA at 13:23. He was hypertensive on presentation () and was given Labetalol 20 mg IVP and started on nicardipine drip (now off) called for MICU  (03 May 2021 16:17). Pt had MRI Brain which showed < from: MR Head w/wo IV Cont (05.04.21 @ 14:34) >  New large bifrontal lobar hemorrhage with hematocrit level, right larger than left, since the prior CT from 5/3/2021. Additional smaller focus of hemorrhage in the right occipital lobe. Also noted are multiple scattered punctate susceptibility signals in the bilateral cerebral hemispheres. These constellations of findings are suspicious for lobar hemorrhage associated with amyloid angiopathy. Associated moderate surrounding edema and mass effect, with leftward midline shift measuring 0.4 cm.    Small foci of acute infarct in the left temporal and right parietal lobes.    Linear signal abnormality and enhancement along the right superior cerebral sulcus and precentral sulci could reflect subarachnoid hemorrhage.    < end of copied text >    rpt HCT showed     < from: CT Head No Cont (05.04.21 @ 15:40) >  Redemonstration of large hyperdense lobar hemorrhage in the bifrontal lobes, right larger than left, as well as smaller foci of hemorrhages in the right occipital and left temporal lobes. There is moderate mass effect resulting in 0.4 cm anterior midline shift to the left. On the same day MRI of the brain, there are findings suggestive of amyloid angiopathy.    Mild scattered bilateral subarachnoid hemorrhages, predominantly in the superior right frontal sulcus.    Small acute infarcts in the left temporal and right parietal lobes seen on the same day MRI are not well appreciated on the CT.    < end of copied text >      PAST MEDICAL & SURGICAL HISTORY:  Gout  not on treatment, no recent flares    No significant past surgical history    Home Medications:    Allergies    No Known Allergies    Intolerances        ROS:  [  ] A ten-point review of systems is negative except as noted   [X] Due to altered mental status/intubation, subjective information were not able to be obtained from the patient. History was obtained, to the extent possible, from review of the chart and collateral sources of information    MEDICATIONS  (STANDING):  chlorhexidine 4% Liquid 1 Application(s) Topical <User Schedule>  niCARdipine Infusion 5 mG/Hr (25 mL/Hr) IV Continuous <Continuous>  sodium chloride 0.9% Bolus 500 milliLiter(s) IV Bolus once  sodium chloride 0.9%. 1000 milliLiter(s) (75 mL/Hr) IV Continuous <Continuous>    MEDICATIONS  (PRN):      ICU Vital Signs Last 24 Hrs  T(C): 37.2 (04 May 2021 16:00), Max: 37.2 (04 May 2021 16:00)  T(F): 98.9 (04 May 2021 16:00), Max: 98.9 (04 May 2021 16:00)  HR: 76 (04 May 2021 16:30) (52 - 92)  BP: 153/61 (04 May 2021 16:30) (127/64 - 208/78)  BP(mean): 92 (04 May 2021 16:30) (74 - 115)  ABP: --  ABP(mean): --  RR: 26 (04 May 2021 16:30) (18 - 50)  SpO2: 100% (04 May 2021 16:30) (94% - 100%)      I&O's Detail    03 May 2021 07:01  -  04 May 2021 07:00  --------------------------------------------------------  IN:    NiCARdipine: 22.5 mL  Total IN: 22.5 mL    OUT:    Indwelling Catheter - Urethral (mL): 2950 mL  Total OUT: 2950 mL    Total NET: -2927.5 mL      04 May 2021 07:01  -  04 May 2021 17:02  --------------------------------------------------------  IN:    NiCARdipine: 100 mL    sodium chloride 0.9%: 600 mL    Sodium Chloride 0.9% Bolus: 1000 mL  Total IN: 1700 mL    OUT:    Indwelling Catheter - Urethral (mL): 680 mL  Total OUT: 680 mL    Total NET: 1020 mL          CBC Full  -  ( 04 May 2021 04:45 )  WBC Count : 7.99 K/uL  RBC Count : 4.74 M/uL  Hemoglobin : 12.0 g/dL  Hematocrit : 37.9 %  Platelet Count - Automated : 181 K/uL  Mean Cell Volume : 80.0 fL  Mean Cell Hemoglobin : 25.3 pg  Mean Cell Hemoglobin Concentration : 31.7 g/dL  Auto Neutrophil # : 5.93 K/uL  Auto Lymphocyte # : 1.43 K/uL  Auto Monocyte # : 0.55 K/uL  Auto Eosinophil # : 0.03 K/uL  Auto Basophil # : 0.03 K/uL  Auto Neutrophil % : 74.1 %  Auto Lymphocyte % : 17.9 %  Auto Monocyte % : 6.9 %  Auto Eosinophil % : 0.4 %  Auto Basophil % : 0.4 %    05-04    134<L>  |  99  |  16  ----------------------------<  111<H>  4.6   |  24  |  0.9    Ca    9.5      04 May 2021 04:45    TPro  7.0  /  Alb  4.3  /  TBili  0.5  /  DBili  x   /  AST  23  /  ALT  10  /  AlkPhos  86  05-04    CARDIAC MARKERS ( 03 May 2021 13:12 )  x     / <0.01 ng/mL / x     / x     / 4.1 ng/mL          On PE:    Sleepy but arousable - then awake and alert  PERRL  Left gaze preference  ? droop  Some verbal response to questions - pt does not speak English  Moves B/L LE spontaneously  Extends RUE  Moves LUE purposefully - squeezed hand and showed thumb to command      Assessment:  Acute Bifrontal ICH  Acute Left Temporal and Right Parietal infarcts     Plan:  No Neurosurgical Intervention at this time  Continue Neuro checks q 1hr  Will follow  Repeat HCT in 6 hours  Keppra 500mg IV q12hrs  D/W Attending

## 2021-05-04 NOTE — CONSULT NOTE ADULT - ATTENDING COMMENTS
I have personally seen and examined this patient.  I have fully participated in the care of this patient.  I have reviewed all pertinent clinical information, including history, physical exam, plan and note. 86 year old man with history of Gout presents with acute onset aphasia and right sided weakness. NIH score is 18 upon presentation. IV tpa was given. CTA and CTP showed no LVO. Recommend ICU admission, stroke work up and VEEG to r/o seizure.   I have reviewed all pertinent clinical information and reviewed all relevant imaging and diagnostic studies personally.  Recommendations as above.  Agree with above assessment except as noted.
The patient was seen and examined.  Patient's history, presentation, an interval events were reviewed.    On examination, patient's is sleepy, but arousable to minimal noxious stimuli.  He demonstrates right upper extremity hemiparesis.    He is purposeful left upper extremity, and bilateral lower extremities.    CT scan of the head was reviewed, as well as MRI of brain.    Patient demonstrates bifrontal hemorrhages, right greater than left, as well as other small areas of hemorrhagic conversion of infarction.  Hemorrhages are likely secondary to hemorrhagic conversion of infarction after administration of TPA.    At present time, recommend medical management medical ICU/.    Given bilateral, multifocal nature of hemorrhagic conversion of strokes, neurosurgical intervention, by means of evacuation, we'll likely 9 improve the patient's functional neurological prognosis.    Extensive discussion was held with the patient's granddaughter, who is understanding of plan.    Continue with Keppra. Repeat CTH in 6 hours.

## 2021-05-04 NOTE — CONSULT NOTE ADULT - TIME BILLING
The majority of this encounter was utilized in review of patient's history, examination, formulation of plan, and counseling of the patient's niece, Miriam Mcmanus, and granddaughter.

## 2021-05-04 NOTE — PROGRESS NOTE ADULT - ASSESSMENT
Impression      Acute ischemic stroke sp tpa still aphasic    - repeat head CT/ MRI  -Off nicardipine drip  - Goal BP <180/105 mmHg  - No antiplatelet or antithrombotic med for 24 hrs  - TTE  - Lipid panel and A1C  - VEEG  - NS 75 cc/h  - DVT prophylaxis

## 2021-05-04 NOTE — PROGRESS NOTE ADULT - ASSESSMENT
Assessment: 86 year old right handed gentleman presents to the ED with his granddaughter. Patient last known well at 1130. Patient found to be aphasic with right sided weakness at 12.   stroke code and code NI called on arrival. Patient with NIHSS 18. CTH without intracranial pathology. Risks and benfits of IV tpa given to granddaughter and she agreed to accept it. Patient given IV TPA. On CTA no large vessel occlusion, not a candidate for NI. Remains aphasic with L gaze preference. Pending repeat CTH    Plan:  #Neuro:  - Neurochecks q1h x24hrs post tPA  - Repeat CTH 24hrs post tPA  - MR brain w/o (do not need to get CTH if MRI performed first)  - rEEG  - Start atorvastatin 80mg daily if passes swallow eval    #CV:  - TTE pending  - Keep BP<180/105    #Resp:  - Aspiration precautions    #Renal/Fluid/Electolytes:  - Strict I/Os  - Keep euvolemic  - Monitor lytes, replete as needed    #GI:  - Speech and swallow    #Heme/Onc:  - SCDs  - No ac/ap for 24hrs post tPA    #ID:  - Keep normothermic      Ischemic Stroke Work-up:  CTH: Complete 5/3  Vessel imaging: Complete 5/3  Lipid panel: Complete 195  Serum Cholesterol: 129  Triglycerides: 129  HDL: 48  Non-HDL Cholesterol: 147  LDL: 133  HgA1C: 6.2  2D echo/TTE: Pending  A/C: SCDs pending repeat CTH      SHELBY Baird  Please feel free to contact for any questions or concerns. Thank you.  Extension: #[id number] Assessment: 86 year old right handed gentleman presents to the ED with his granddaughter. Patient last known well at 1130. Patient found to be aphasic with right sided weakness at 12.   stroke code and code NI called on arrival. Patient with NIHSS 18. CTH without intracranial pathology. Risks and benfits of IV tpa given to granddaughter and she agreed to accept it. Patient given IV TPA. On CTA no large vessel occlusion, not a candidate for NI. Remains aphasic with L gaze preference. Pending repeat CTH    Plan:  #Neuro:  - Neurochecks q1h x24hrs post tPA  - Repeat CTH 24hrs post tPA  - MR brain w/o (do not need to get CTH if MRI performed first)  - rEEG  - Start atorvastatin 80mg daily if passes swallow eval    #CV:  - TTE pending  - Keep BP<180/105    #Resp:  - Aspiration precautions    #Renal/Fluid/Electolytes:  - Strict I/Os  - Keep euvolemic  - Monitor lytes, replete as needed    #GI:  - Speech and swallow    #Heme/Onc:  - SCDs  - No ac/ap for 24hrs post tPA    #ID:  - Keep normothermic      Ischemic Stroke Work-up:  CTH: Complete 5/3  Vessel imaging: Complete 5/3  Lipid panel: Complete 195  Serum Cholesterol: 129  Triglycerides: 129  HDL: 48  Non-HDL Cholesterol: 147  LDL: 133  HgA1C: 6.2  2D echo/TTE: Pending  A/C: SCDs pending repeat CTH      Addendum  Pt underwent MRI brain, hemorrhage present on imaging. CTH confirmed acute hemorrhage. Pt intermittently verbal, otherwise exam unchanged.    Continue q1h neurochecks  Nicardipine started, goal -150  Neurosurgery consulted  No ac/ap    SHELBY Baird  Please feel free to contact for any questions or concerns. Thank you.  Extension: #[phone] Assessment: 86 year old right handed gentleman presents to the ED with his granddaughter. Patient last known well at 1130. Patient found to be aphasic with right sided weakness at 12.   stroke code and code NI called on arrival. Patient with NIHSS 18. CTH without intracranial pathology. Risks and benfits of IV tpa given to granddaughter and she agreed to accept it. Patient given IV TPA. On CTA no large vessel occlusion, not a candidate for NI. Remains aphasic with L gaze preference. Pending repeat CTH    Plan:  #Neuro:  - Neurochecks q1h x24hrs post tPA  - Repeat CTH 24hrs post tPA  - MR brain w/o (do not need to get CTH if MRI performed first)  - rEEG  - Start atorvastatin 80mg daily if passes swallow eval    #CV:  - TTE pending  - Keep BP<180/105    #Resp:  - Aspiration precautions    #Renal/Fluid/Electolytes:  - Strict I/Os  - Keep euvolemic  - Monitor lytes, replete as needed    #GI:  - Speech and swallow    #Heme/Onc:  - SCDs  - No ac/ap for 24hrs post tPA    #ID:  - Keep normothermic      Ischemic Stroke Work-up:  CTH: Complete 5/3  Vessel imaging: Complete 5/3  Lipid panel: Complete 195  Serum Cholesterol: 129  Triglycerides: 129  HDL: 48  Non-HDL Cholesterol: 147  LDL: 133  HgA1C: 6.2  2D echo/TTE: Pending  A/C: SCDs pending repeat CTH      Addendum  Pt underwent MRI brain, hemorrhage present on imaging. CTH confirmed acute hemorrhage. Pt intermittently verbal, otherwise exam unchanged.    Continue q1h neurochecks  Nicardipine started, goal -150  Keppra 500mg BID for seizure PPx  Repeat CTH in 6hrs (2200 this evening)  Neurosurgery consulted  No ac/ap    SHELBY Baird  Please feel free to contact for any questions or concerns. Thank you.  Extension: #[phone]

## 2021-05-04 NOTE — SWALLOW BEDSIDE ASSESSMENT ADULT - SLP PERTINENT HISTORY OF CURRENT PROBLEM
86 year old right handed gentleman with no significant PMH presented to the ED with his granddaughter. Patient last known well at 11:30 AM. Patient found to be aphasic with right sided weakness at around noon. Stroke code and code NI called on arrival. Patient with NIHSS 18. CTH without intracranial pathology.Patient given IV TPA at 13:23. He was hypertensive on presentation () and was given Labetalol 20 mg IVP and started on nicardipine drip (now off) called for MICU  (03 May 2021 16:17)

## 2021-05-05 LAB
ALBUMIN SERPL ELPH-MCNC: 4 G/DL — SIGNIFICANT CHANGE UP (ref 3.5–5.2)
ALP SERPL-CCNC: 80 U/L — SIGNIFICANT CHANGE UP (ref 30–115)
ALT FLD-CCNC: 9 U/L — SIGNIFICANT CHANGE UP (ref 0–41)
ANION GAP SERPL CALC-SCNC: 12 MMOL/L — SIGNIFICANT CHANGE UP (ref 7–14)
ANION GAP SERPL CALC-SCNC: 12 MMOL/L — SIGNIFICANT CHANGE UP (ref 7–14)
AST SERPL-CCNC: 24 U/L — SIGNIFICANT CHANGE UP (ref 0–41)
BILIRUB SERPL-MCNC: 0.6 MG/DL — SIGNIFICANT CHANGE UP (ref 0.2–1.2)
BUN SERPL-MCNC: 13 MG/DL — SIGNIFICANT CHANGE UP (ref 10–20)
BUN SERPL-MCNC: 16 MG/DL — SIGNIFICANT CHANGE UP (ref 10–20)
CALCIUM SERPL-MCNC: 8.7 MG/DL — SIGNIFICANT CHANGE UP (ref 8.5–10.1)
CALCIUM SERPL-MCNC: 8.9 MG/DL — SIGNIFICANT CHANGE UP (ref 8.5–10.1)
CHLORIDE SERPL-SCNC: 100 MMOL/L — SIGNIFICANT CHANGE UP (ref 98–110)
CHLORIDE SERPL-SCNC: 104 MMOL/L — SIGNIFICANT CHANGE UP (ref 98–110)
CO2 SERPL-SCNC: 19 MMOL/L — SIGNIFICANT CHANGE UP (ref 17–32)
CO2 SERPL-SCNC: 21 MMOL/L — SIGNIFICANT CHANGE UP (ref 17–32)
CREAT SERPL-MCNC: 0.7 MG/DL — SIGNIFICANT CHANGE UP (ref 0.7–1.5)
CREAT SERPL-MCNC: 0.7 MG/DL — SIGNIFICANT CHANGE UP (ref 0.7–1.5)
GLUCOSE SERPL-MCNC: 94 MG/DL — SIGNIFICANT CHANGE UP (ref 70–99)
GLUCOSE SERPL-MCNC: 95 MG/DL — SIGNIFICANT CHANGE UP (ref 70–99)
HCT VFR BLD CALC: 37.9 % — LOW (ref 42–52)
HGB BLD-MCNC: 11.7 G/DL — LOW (ref 14–18)
MAGNESIUM SERPL-MCNC: 1.8 MG/DL — SIGNIFICANT CHANGE UP (ref 1.8–2.4)
MCHC RBC-ENTMCNC: 24.2 PG — LOW (ref 27–31)
MCHC RBC-ENTMCNC: 30.9 G/DL — LOW (ref 32–37)
MCV RBC AUTO: 78.3 FL — LOW (ref 80–94)
NRBC # BLD: 0 /100 WBCS — SIGNIFICANT CHANGE UP (ref 0–0)
PLATELET # BLD AUTO: 168 K/UL — SIGNIFICANT CHANGE UP (ref 130–400)
POTASSIUM SERPL-MCNC: 3.8 MMOL/L — SIGNIFICANT CHANGE UP (ref 3.5–5)
POTASSIUM SERPL-MCNC: 4 MMOL/L — SIGNIFICANT CHANGE UP (ref 3.5–5)
POTASSIUM SERPL-SCNC: 3.8 MMOL/L — SIGNIFICANT CHANGE UP (ref 3.5–5)
POTASSIUM SERPL-SCNC: 4 MMOL/L — SIGNIFICANT CHANGE UP (ref 3.5–5)
PROT SERPL-MCNC: 6.5 G/DL — SIGNIFICANT CHANGE UP (ref 6–8)
RBC # BLD: 4.84 M/UL — SIGNIFICANT CHANGE UP (ref 4.7–6.1)
RBC # FLD: 14.8 % — HIGH (ref 11.5–14.5)
SODIUM SERPL-SCNC: 133 MMOL/L — LOW (ref 135–146)
SODIUM SERPL-SCNC: 135 MMOL/L — SIGNIFICANT CHANGE UP (ref 135–146)
WBC # BLD: 8.2 K/UL — SIGNIFICANT CHANGE UP (ref 4.8–10.8)
WBC # FLD AUTO: 8.2 K/UL — SIGNIFICANT CHANGE UP (ref 4.8–10.8)

## 2021-05-05 PROCEDURE — 71045 X-RAY EXAM CHEST 1 VIEW: CPT | Mod: 26

## 2021-05-05 PROCEDURE — 70450 CT HEAD/BRAIN W/O DYE: CPT | Mod: 26

## 2021-05-05 PROCEDURE — 99232 SBSQ HOSP IP/OBS MODERATE 35: CPT

## 2021-05-05 PROCEDURE — 70450 CT HEAD/BRAIN W/O DYE: CPT | Mod: 26,77

## 2021-05-05 RX ORDER — MANNITOL
25 POWDER (GRAM) MISCELLANEOUS ONCE
Refills: 0 | Status: COMPLETED | OUTPATIENT
Start: 2021-05-05 | End: 2021-05-05

## 2021-05-05 RX ORDER — SODIUM CHLORIDE 5 G/100ML
500 INJECTION, SOLUTION INTRAVENOUS
Refills: 0 | Status: DISCONTINUED | OUTPATIENT
Start: 2021-05-05 | End: 2021-05-05

## 2021-05-05 RX ORDER — SODIUM CHLORIDE 9 MG/ML
30 INJECTION INTRAMUSCULAR; INTRAVENOUS; SUBCUTANEOUS ONCE
Refills: 0 | Status: DISCONTINUED | OUTPATIENT
Start: 2021-05-05 | End: 2021-05-05

## 2021-05-05 RX ORDER — SODIUM CHLORIDE 5 G/100ML
250 INJECTION, SOLUTION INTRAVENOUS
Refills: 0 | Status: COMPLETED | OUTPATIENT
Start: 2021-05-05 | End: 2021-05-05

## 2021-05-05 RX ORDER — SODIUM CHLORIDE 5 G/100ML
500 INJECTION, SOLUTION INTRAVENOUS
Refills: 0 | Status: DISCONTINUED | OUTPATIENT
Start: 2021-05-05 | End: 2021-05-07

## 2021-05-05 RX ADMIN — LEVETIRACETAM 420 MILLIGRAM(S): 250 TABLET, FILM COATED ORAL at 17:29

## 2021-05-05 RX ADMIN — Medication 125 GRAM(S): at 21:39

## 2021-05-05 RX ADMIN — CHLORHEXIDINE GLUCONATE 1 APPLICATION(S): 213 SOLUTION TOPICAL at 05:13

## 2021-05-05 RX ADMIN — LEVETIRACETAM 420 MILLIGRAM(S): 250 TABLET, FILM COATED ORAL at 05:13

## 2021-05-05 RX ADMIN — PANTOPRAZOLE SODIUM 40 MILLIGRAM(S): 20 TABLET, DELAYED RELEASE ORAL at 12:22

## 2021-05-05 RX ADMIN — SODIUM CHLORIDE 1500 MILLILITER(S): 5 INJECTION, SOLUTION INTRAVENOUS at 21:39

## 2021-05-05 NOTE — PHYSICAL THERAPY INITIAL EVALUATION ADULT - DIAGNOSIS, PT EVAL
Debilitation, Bilateral Frontal SAH, RT Occipital/Parietal hrg, LT Temporal hrg CVA, s/p tPA. Bilateral Frontal SAH, RT Occipital/Parietal hrg, LT Temporal hrg

## 2021-05-05 NOTE — PHYSICAL THERAPY INITIAL EVALUATION ADULT - ACTIVE RANGE OF MOTION EXAMINATION, REHAB EVAL
LT UE AROM - WDL, RT Triceps spasticity, LT LE hip/knee ext 1/4 AAROM, RT hip/knee trace extension AAROM/deficits as listed below

## 2021-05-05 NOTE — CHART NOTE - NSCHARTNOTEFT_GEN_A_CORE
Called by RN about worsening NIH score. On exam RUE no longer has any resistance to gravity and the patient was lethargic, but responsive to painful stimuli. Neurocritical care team informed. Stat CT head obtained. Mannitol and 250cc bolus of 3% were ordered. Miriam Mcmanus was called for an update on the patients condition and about consent for possible central line. She stated that she would have to talk to her family prior to make them making a decision.

## 2021-05-05 NOTE — PROGRESS NOTE ADULT - SUBJECTIVE AND OBJECTIVE BOX
OVERNIGHT EVENTS: events noted, cardene drip 2.5, NS 75 CC/H    Vital Signs Last 24 Hrs  T(C): 36.6 (05 May 2021 08:00), Max: 37.2 (04 May 2021 16:00)  T(F): 97.8 (05 May 2021 08:00), Max: 98.9 (04 May 2021 16:00)  HR: 70 (05 May 2021 08:00) (52 - 98)  BP: 125/58 (05 May 2021 08:00) (105/50 - 208/78)  BP(mean): 93 (05 May 2021 08:00) (15 - 117)  RR: 20 (05 May 2021 08:00) (15 - 32)  SpO2: 100% (05 May 2021 08:00) (94% - 100%)    PHYSICAL EXAMINATION:    GENERAL: ill looking    HEENT: Head is normocephalic and atraumatic.     NECK: Supple.    LUNGS: Clear to auscultation without wheezing, rales or rhonchi; respirations unlabored    HEART: Regular rate and rhythm without murmur.    ABDOMEN: Soft, nontender, and nondistended.      EXTREMITIES: Without any cyanosis, clubbing, rash, lesions or edema.    NEUROLOGIC: Aphasic, r side weakness    SKIN: No ulceration or induration present.      LABS:                        11.7   8.20  )-----------( 168      ( 05 May 2021 04:33 )             37.9     05-05    133<L>  |  100  |  13  ----------------------------<  95  4.0   |  21  |  0.7    Ca    8.7      05 May 2021 04:33  Mg     1.8     05-05    TPro  6.5  /  Alb  4.0  /  TBili  0.6  /  DBili  x   /  AST  24  /  ALT  9   /  AlkPhos  80  05-05    PT/INR - ( 03 May 2021 13:12 )   PT: 10.90 sec;   INR: 0.95 ratio         PTT - ( 03 May 2021 13:12 )  PTT:30.0 sec      CARDIAC MARKERS ( 03 May 2021 13:12 )  x     / <0.01 ng/mL / x     / x     / 4.1 ng/mL                  05-04-21 @ 07:01  -  05-05-21 @ 07:00  --------------------------------------------------------  IN: 3512.5 mL / OUT: 1805 mL / NET: 1707.5 mL    05-05-21 @ 07:01 - 05-05-21 @ 08:20  --------------------------------------------------------  IN: 175 mL / OUT: 40 mL / NET: 135 mL        MICROBIOLOGY:      MEDICATIONS  (STANDING):  chlorhexidine 4% Liquid 1 Application(s) Topical <User Schedule>  levETIRAcetam  IVPB 500 milliGRAM(s) IV Intermittent every 12 hours  niCARdipine Infusion 5 mG/Hr (25 mL/Hr) IV Continuous <Continuous>  pantoprazole  Injectable 40 milliGRAM(s) IV Push daily  sodium chloride 0.9%. 1000 milliLiter(s) (75 mL/Hr) IV Continuous <Continuous>    MEDICATIONS  (PRN):      RADIOLOGY & ADDITIONAL STUDIES:

## 2021-05-05 NOTE — PROGRESS NOTE ADULT - SUBJECTIVE AND OBJECTIVE BOX
Neurocritical Care Progress Note    YANETH MERCADO    86y     [id number]     Daily Weight in k.1 (05 May 2021 00:00)  COVID-19 PCR: NotDetec (03 May 2021 14:58)    Patient is a 86y old  Male who presents with a chief complaint of Stroke (04 May 2021 17:02)    HPI:  86 year old right handed gentleman with no significant PMH presented to the ED with his granddaughter. Patient last known well at 11:30 AM. Patient found to be aphasic with right sided weakness at around noon. Stroke code and code NI called on arrival. Patient with NIHSS 18. CTH without intracranial pathology. Patient given IV TPA at 13:23. He was hypertensive on presentation () and was given Labetalol 20 mg IVP and started on nicardipine drip (now off) called for MICU  (03 May 2021 16:17)    Allergies:  No Known Allergies    PAST MEDICAL & SURGICAL HISTORY:  Gout  not on treatment, no recent flares    No significant past surgical history    24 Hour Events:  No NSx intervention at this time, as evacuation will not likely improve the patient's functional neurological prognosis. Stability scan performed overnight and stable. SBP in the lower range, so nicardipine gtt held w/ bolus given and levo started. Will continue to monitor. On exam, pt’s eyes remained closed and globally aphasic, did not follow commands (even in his native language via ), but Margarita.   Exam:  Neurologic Exam:  Mental status: Patient lays in bed w/ eyes closed shut and mute; BRITTA mentation at this time. Not following commands  Language: BRITTA language at this time. Patient remains globally aphasic  Cranial nerves: Pupils equally round and reactive to light. BRITTA visual fields or nystagmus (patient tightens eyes shut when examiner attempts to force open). BRITTA extraocular muscles. Face appears symmetric at rest. BRITTA hearing (pt does not follow commands)  Motor:  Normal bulk and tone. Strength:    3/5 in RUE  4/5 in LUE  2/5 in RLE  2/5 in LLE   strength: BRITTA  Motor: No tremors or tics noted. Margarita spontaneously  Sensation: Localizes UE b/l (L>R) to noxious trapezius squeeze. No neglect noted. W/d LE b/l to painful nailbed pressure. In addition, grimaces to pain  Coordination: BRITTA (does not follow commands)    Current Medications:  chlorhexidine 4% Liquid 1 Application(s) Topical <User Schedule>  levETIRAcetam  IVPB 500 milliGRAM(s) IV Intermittent every 12 hours  niCARdipine Infusion 5 mG/Hr IV Continuous <Continuous>  pantoprazole  Injectable 40 milliGRAM(s) IV Push daily  sodium chloride 0.9%. 1000 milliLiter(s) IV Continuous <Continuous>    Vital Signs Last 24 Hrs  T(C): 36.7 (05 May 2021 00:00), Max: 37.2 (04 May 2021 16:00)  T(F): 98 (05 May 2021 00:00), Max: 98.9 (04 May 2021 16:00)  HR: 76 (05 May 2021 00:00) (52 - 86)  BP: 121/50 (05 May 2021 00:00) (115/54 - 208/78)  BP(mean): 73 (05 May 2021 00:00) (68 - 115)  RR: 15 (05 May 2021 00:00) (15 - 50)  SpO2: 100% (05 May 2021 00:00) (94% - 100%)     I/Os:  I&O's Detail    03 May 2021 07:01  -  04 May 2021 07:00  --------------------------------------------------------  IN:    NiCARdipine: 22.5 mL  Total IN: 22.5 mL    OUT:    Indwelling Catheter - Urethral (mL): 2950 mL  Total OUT: 2950 mL    Total NET: -2927.5 mL      04 May 2021 07:01  -  05 May 2021 01:00  --------------------------------------------------------  IN:    IV PiggyBack: 600 mL    NiCARdipine: 187.5 mL    sodium chloride 0.9%: 1125 mL    Sodium Chloride 0.9% Bolus: 1000 mL  Total IN: 2912.5 mL    OUT:    Indwelling Catheter - Urethral (mL): 1175 mL  Total OUT: 1175 mL    Total NET: 1737.5 mL    LIVER FUNCTIONS - ( 04 May 2021 04:45 )  Alb: 4.3 g/dL / Pro: 7.0 g/dL / ALK PHOS: 86 U/L / ALT: 10 U/L / AST: 23 U/L / GGT: x                               12.0   7.99  )-----------( 181      ( 04 May 2021 04:45 )             37.9      05-04    134<L>  |  99  |  16  ----------------------------<  111<H>  4.6   |  24  |  0.9    Ca    9.5      04 May 2021 04:45    TPro  7.0  /  Alb  4.3  /  TBili  0.5  /  DBili  x   /  AST  23  /  ALT  10  /  AlkPhos  86  05-04     PT/INR - ( 03 May 2021 13:12 )   PT: 10.90 sec;   INR: 0.95 ratio      PTT - ( 03 May 2021 13:12 )  PTT:30.0 sec  Adult Advanced Hemodynamics Last 24 Hrs  CVP(mm Hg): --  CVP(cm H2O): --  CO: --  CI: --  PA: --  PA(mean): --  PCWP: --  SVR: --  SVRI: --  PVR: --  PVRI: --  CARDIAC MARKERS ( 03 May 2021 13:12 )  x     / <0.01 ng/mL / x     / x     / 4.1 ng/mL    Diagnostics/ Results:  Last CTH: < from: CT Head No Cont (21 @ 15:40) >  IMPRESSION:    Redemonstration of large hyperdense lobar hemorrhage in the bifrontal lobes, right larger than left, as well as smaller foci of hemorrhages in the right occipital and left temporal lobes. There is moderate mass effect resulting in 0.4 cm anterior midline shift to the left. On the same day MRI of the brain, there are findings suggestive of amyloid angiopathy.    Mild scattered bilateral subarachnoid hemorrhages, predominantly in the superior right frontal sulcus.    Small acute infarcts in the left temporal and right parietal lobes seen on the same day MRI are not well appreciated on the CT.    < end of copied text >    Last CTA/MRA: < from: CT Angio Neck w/ IV Cont (21 @ 14:10) >  IMPRESSION:    1.  No evidence of acute large vessel occlusion. Normal perfusion images.    2.  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    3.  Moderate (about 60% stenosis) of the right ICA origin.    4.  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    < end of copied text >    Last CTP: < from: CT Angio Neck w/ IV Cont (21 @ 14:10) >  IMPRESSION:    1.  No evidence of acute large vessel occlusion. Normal perfusion images.    2.  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    3.  Moderate (about 60% stenosis) of the right ICA origin.    4.  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    < end of copied text >    Last MRI: < from: MR Head w/wo IV Cont (21 @ 14:34) >  IMPRESSION:    New large bifrontal lobar hemorrhage with hematocrit level, right larger than left, since the prior CT from 5/3/2021. Additional smaller focus of hemorrhage in the right occipital lobe. Also noted are multiple scattered punctate susceptibility signals in the bilateral cerebral hemispheres. These constellations of findings are suspicious for lobar hemorrhage associated with amyloid angiopathy. Associated moderate surrounding edema and mass effect, with leftward midline shift measuring 0.4 cm.    Small foci of acute infarct in the left temporal and right parietal lobes.    Linear signal abnormality and enhancement along the right superior cerebral sulcus and precentral sulci could reflect subarachnoid hemorrhage.    < end of copied text >    Last TCD: n/a    Last EEG: < from: EEG (21 @ 12:30) >  PDR  Continuous  Background: 7 hz    Generalized Slowing  Yes  mild - moderate    Focal Slowing  Yes  Left  moderate  hemisphere    Breach Artifact:  No    Activation Procedure  Hyper Ventilation  No    Photic Stimulation  No    Epileptiform Activity  No    Events:  No    Impression:  Abnormal due to the presence of: focal slowing as above, generalized slowing as above    Clinical Correlation & Recommendations  Consistent with diffuse cerebral electrophysiological dysfunction with suggestion of greater left hemispheric involvement secondary to nonspecific cause.    < end of copied text >    Last Echo: < from: TTE Echo Complete w/o Contrast w/ Doppler (21 @ 10:16) >  Summary:   1. Normal global left ventricular systolic function.   2. LV Ejection Fraction by Solomon's Method with a biplane EF of 72 %.   3. Mild left ventricular hypertrophy.   4. Mildly increased LV wall thickness.   5. Normal left ventricular internal cavity size.   6. Normal left atrial size.   7. Normal right atrial size.   8. There is no evidence of pericardial effusion.   9. Mild mitral annular calcification.  10. Mild mitral valve regurgitation.  11. Mild thickening of the anterior and posterior mitral valve leaflets.  12. Mild tricuspid regurgitation.  13. Mild to moderate aortic regurgitation.  14. Color flow doppler and intravenous injection of agitated saline demonstrates the presence of an intact intra atrial septum.    < end of copied text >    Last EKG: < from: 12 Lead ECG (21 @ 14:15) >  Diagnosis Line Normal sinus rhythm  Normal ECG    < end of copied text >    Chest Xray: < from: Xray Chest 1 View AP/PA (21 @ 14:32) >  Impression:    No radiographic evidence of acute cardiopulmonary disease.    < end of copied text >    ROS:  [X] A ten-point ROS was otherwise negative except as noted in HPI    Assessment:      Plan:  Neurology:      Respiratory:  -Keep HOB > 35; aspiration precautions     Cardiology:      Vascular:      GI/Nutrition:  -Diet, NPO (21 @ 18:16) [Active]        -Bowel Regimen ->  -PPx -> Pantoprazole       / Renal/ Fluids/ Electrolytes:      Hematology/ Oncology:  -DVT PPx -> Heparin SQ    Lovenox SQ    Heparin gtt     SCDs      Infectious Disease:      Musculoskeletol:      Endocrine:      Tubes/ Lines/ Drains:  Central    Peripheral    A-line    Olivares    NGT/ OGT    Chest    EVD    Disposition:  Continue medical management as per primary team in:   ICU   CCU   SICU   Stroke Unit   Stepdown    CODE STATUS w/ Next of Kin:   DNI   DNR   FULL    Patient seen and case discussed with NCC attending; see attending attestation  Please call w/ questions  Lorie Nam NP  [id number]             Neurocritical Care Progress Note    YANETH MERCADO    86y     [id number]     Daily Weight in k.1 (05 May 2021 00:00)  COVID-19 PCR: NotDetec (03 May 2021 14:58)    Patient is a 86y old  Male who presents with a chief complaint of Stroke (04 May 2021 17:02)    HPI:  86 year old right handed gentleman with no significant PMH presented to the ED with his granddaughter. Patient last known well at 11:30 AM. Patient found to be aphasic with right sided weakness at around noon. Stroke code and code NI called on arrival. Patient with NIHSS 18. CTH without intracranial pathology. Patient given IV TPA at 13:23. He was hypertensive on presentation () and was given Labetalol 20 mg IVP and started on nicardipine drip (now off) called for MICU  (03 May 2021 16:17)    Allergies:  No Known Allergies    PAST MEDICAL & SURGICAL HISTORY:  Gout  not on treatment, no recent flares    No significant past surgical history    24 Hour Events:  No NSx intervention at this time, as evacuation will not likely improve the patient's functional neurological prognosis. Stability scan performed overnight and stable. SBP in the lower range, so nicardipine gtt held w/ bolus given and levo started. Will continue to monitor. On exam, pt’s eyes remained closed and globally aphasic, did not follow commands (even in his native language via ), but Margarita.   Exam:  Neurologic Exam:  Mental status: Patient lays in bed w/ eyes closed shut and mute; BRITTA mentation at this time. Not following commands  Language: BRITTA language at this time. Patient remains globally aphasic  Cranial nerves: Pupils equally round and reactive to light. BRITTA visual fields or nystagmus (patient tightens eyes shut when examiner attempts to force open). BRITTA extraocular muscles. Face appears symmetric at rest. BRITTA hearing (pt does not follow commands)  Motor:  Normal bulk and tone. Strength:    3/5 in RUE  4/5 in LUE  2/5 in RLE  2/5 in LLE   strength: BRITTA  Motor: No tremors or tics noted. Margarita spontaneously  Sensation: Localizes UE b/l (L>R) to noxious trapezius squeeze. No neglect noted. W/d LE b/l to painful nailbed pressure. In addition, grimaces to pain  Coordination: BRITTA (does not follow commands)    Current Medications:  chlorhexidine 4% Liquid 1 Application(s) Topical <User Schedule>  levETIRAcetam  IVPB 500 milliGRAM(s) IV Intermittent every 12 hours  niCARdipine Infusion 5 mG/Hr IV Continuous <Continuous>  pantoprazole  Injectable 40 milliGRAM(s) IV Push daily  sodium chloride 0.9%. 1000 milliLiter(s) IV Continuous <Continuous>    Vital Signs Last 24 Hrs  T(C): 36.7 (05 May 2021 00:00), Max: 37.2 (04 May 2021 16:00)  T(F): 98 (05 May 2021 00:00), Max: 98.9 (04 May 2021 16:00)  HR: 76 (05 May 2021 00:00) (52 - 86)  BP: 121/50 (05 May 2021 00:00) (115/54 - 208/78)  BP(mean): 73 (05 May 2021 00:00) (68 - 115)  RR: 15 (05 May 2021 00:00) (15 - 50)  SpO2: 100% (05 May 2021 00:00) (94% - 100%)     I/Os:  I&O's Detail    03 May 2021 07:01  -  04 May 2021 07:00  --------------------------------------------------------  IN:    NiCARdipine: 22.5 mL  Total IN: 22.5 mL    OUT:    Indwelling Catheter - Urethral (mL): 2950 mL  Total OUT: 2950 mL    Total NET: -2927.5 mL      04 May 2021 07:01  -  05 May 2021 01:00  --------------------------------------------------------  IN:    IV PiggyBack: 600 mL    NiCARdipine: 187.5 mL    sodium chloride 0.9%: 1125 mL    Sodium Chloride 0.9% Bolus: 1000 mL  Total IN: 2912.5 mL    OUT:    Indwelling Catheter - Urethral (mL): 1175 mL  Total OUT: 1175 mL    Total NET: 1737.5 mL    LIVER FUNCTIONS - ( 04 May 2021 04:45 )  Alb: 4.3 g/dL / Pro: 7.0 g/dL / ALK PHOS: 86 U/L / ALT: 10 U/L / AST: 23 U/L / GGT: x                               12.0   7.99  )-----------( 181      ( 04 May 2021 04:45 )             37.9      05-04    134<L>  |  99  |  16  ----------------------------<  111<H>  4.6   |  24  |  0.9    Ca    9.5      04 May 2021 04:45    TPro  7.0  /  Alb  4.3  /  TBili  0.5  /  DBili  x   /  AST  23  /  ALT  10  /  AlkPhos  86  05-04     PT/INR - ( 03 May 2021 13:12 )   PT: 10.90 sec;   INR: 0.95 ratio      PTT - ( 03 May 2021 13:12 )  PTT:30.0 sec  Adult Advanced Hemodynamics Last 24 Hrs  CVP(mm Hg): --  CVP(cm H2O): --  CO: --  CI: --  PA: --  PA(mean): --  PCWP: --  SVR: --  SVRI: --  PVR: --  PVRI: --  CARDIAC MARKERS ( 03 May 2021 13:12 )  x     / <0.01 ng/mL / x     / x     / 4.1 ng/mL    Diagnostics/ Results:  Last CTH: < from: CT Head No Cont (21 @ 15:40) >  IMPRESSION:    Redemonstration of large hyperdense lobar hemorrhage in the bifrontal lobes, right larger than left, as well as smaller foci of hemorrhages in the right occipital and left temporal lobes. There is moderate mass effect resulting in 0.4 cm anterior midline shift to the left. On the same day MRI of the brain, there are findings suggestive of amyloid angiopathy.    Mild scattered bilateral subarachnoid hemorrhages, predominantly in the superior right frontal sulcus.    Small acute infarcts in the left temporal and right parietal lobes seen on the same day MRI are not well appreciated on the CT.    < end of copied text >    Last CTA/MRA: < from: CT Angio Neck w/ IV Cont (21 @ 14:10) >  IMPRESSION:    1.  No evidence of acute large vessel occlusion. Normal perfusion images.    2.  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    3.  Moderate (about 60% stenosis) of the right ICA origin.    4.  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    < end of copied text >    Last CTP: < from: CT Angio Neck w/ IV Cont (21 @ 14:10) >  IMPRESSION:    1.  No evidence of acute large vessel occlusion. Normal perfusion images.    2.  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    3.  Moderate (about 60% stenosis) of the right ICA origin.    4.  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    < end of copied text >    Last MRI: < from: MR Head w/wo IV Cont (21 @ 14:34) >  IMPRESSION:    New large bifrontal lobar hemorrhage with hematocrit level, right larger than left, since the prior CT from 5/3/2021. Additional smaller focus of hemorrhage in the right occipital lobe. Also noted are multiple scattered punctate susceptibility signals in the bilateral cerebral hemispheres. These constellations of findings are suspicious for lobar hemorrhage associated with amyloid angiopathy. Associated moderate surrounding edema and mass effect, with leftward midline shift measuring 0.4 cm.    Small foci of acute infarct in the left temporal and right parietal lobes.    Linear signal abnormality and enhancement along the right superior cerebral sulcus and precentral sulci could reflect subarachnoid hemorrhage.    < end of copied text >    Last TCD: n/a    Last EEG: < from: EEG (21 @ 12:30) >  PDR  Continuous  Background: 7 hz    Generalized Slowing  Yes  mild - moderate    Focal Slowing  Yes  Left  moderate  hemisphere    Breach Artifact:  No    Activation Procedure  Hyper Ventilation  No    Photic Stimulation  No    Epileptiform Activity  No    Events:  No    Impression:  Abnormal due to the presence of: focal slowing as above, generalized slowing as above    Clinical Correlation & Recommendations  Consistent with diffuse cerebral electrophysiological dysfunction with suggestion of greater left hemispheric involvement secondary to nonspecific cause.    < end of copied text >    Last Echo: < from: TTE Echo Complete w/o Contrast w/ Doppler (21 @ 10:16) >  Summary:   1. Normal global left ventricular systolic function.   2. LV Ejection Fraction by Solomon's Method with a biplane EF of 72 %.   3. Mild left ventricular hypertrophy.   4. Mildly increased LV wall thickness.   5. Normal left ventricular internal cavity size.   6. Normal left atrial size.   7. Normal right atrial size.   8. There is no evidence of pericardial effusion.   9. Mild mitral annular calcification.  10. Mild mitral valve regurgitation.  11. Mild thickening of the anterior and posterior mitral valve leaflets.  12. Mild tricuspid regurgitation.  13. Mild to moderate aortic regurgitation.  14. Color flow doppler and intravenous injection of agitated saline demonstrates the presence of an intact intra atrial septum.    < end of copied text >    Last EKG: < from: 12 Lead ECG (21 @ 14:15) >  Diagnosis Line Normal sinus rhythm  Normal ECG    < end of copied text >    Chest Xray: < from: Xray Chest 1 View AP/PA (21 @ 14:32) >  Impression:    No radiographic evidence of acute cardiopulmonary disease.    < end of copied text >    ROS:  [X] A ten-point ROS was otherwise negative except as noted in HPI    Assessment:  85 y/o M w/ PMH gout and family history of stroke p/w acute onset aphasia, L gaze preference, R HP, and was in hypertensive emergency. Stroke code called; initial NIHSS 18. Initial CTH w/o pathology. Pt w/in window for tPA (labetalol given for HTN). MRI and repeat CT consistent with bifrontal bilateral hemorrhages (without clinical worsening), which are likely 2/2 to hemorrhagic conversion of infarction after administration of TPA. No NSx intervention at this time, as evacuation will not likely improve the patient's functional neurological prognosis. Stability scan performed overnight and stable. SBP in the lower range, so nicardipine gtt held w/ bolus given. Will continue to monitor. On exam, pt’s eyes remained closed and globally aphasic, did not follow commands (even in his native language via ), but Margarita.     Plan:  Neurology:  -Neuro checks Q1; (new b/l hemorrhages). STAT CTH for acute change in neurological status  -NSx consult; no surgical intervention at this time, as evacuation will likely not improve the patient's functional neurological prognosis  -C/w Keppra 500 BID; seizure ppx  -Avoid AC/AP; bleeds likely secondary to hemorrhagic conversion of infarction after administration of TPA  -Obtain rEEG; unresponsiveness     Respiratory:  -Keep HOB > 35; aspiration precautions     Cardiology:  -Keep SBP goal 120-150; titrate nicardipine gtt PRN    GI/Nutrition:  -Diet, NPO (21 @ 18:16) [Active]  -Speech and swallow when able  -Bowel Regimen ->n/a  -PPx -> Pantoprazole     / Renal/ Fluids/ Electrolytes:  -IVF PRN for hypotension   -Monitor and treat lytes; Na+ last 134  -Keep euvolemic    Hematology/ Oncology:  -DVT PPx -> Heparin SQ    Lovenox SQ    Heparin gtt     SCDs    Infectious Disease:  -Monitor and treat fevers; tylenol PRN    Musculoskeletol:  -Bedrest    Endocrine:  -Lipitor when able for PO    Tubes/ Lines/ Drains:  Central    Peripheral    A-line    Olivares    NGT/ OGT    Chest    EVD    Disposition:  Continue medical management as per primary team in:   ICU   CCU   SICU   Stroke Unit   Stepdown    CODE STATUS w/ Next of Kin:   DNI   DNR   FULL    Patient seen and case discussed with NCC attending; see attending attestation  Please call w/ questions  Lorie Nam NP  [phone]             Neurocritical Care Progress Note    YANETH MERCADO    86y     [id number]     Daily Weight in k.1 (05 May 2021 00:00)  COVID-19 PCR: NotDetec (03 May 2021 14:58)    Patient is a 86y old  Male who presents with a chief complaint of Stroke (04 May 2021 17:02)    HPI:  86 year old right handed gentleman with no significant PMH presented to the ED with his granddaughter. Patient last known well at 11:30 AM. Patient found to be aphasic with right sided weakness at around noon. Stroke code and code NI called on arrival. Patient with NIHSS 18. CTH without intracranial pathology. Patient given IV TPA at 13:23. He was hypertensive on presentation () and was given Labetalol 20 mg IVP and started on nicardipine drip (now off) called for MICU  (03 May 2021 16:17)    Allergies:  No Known Allergies    PAST MEDICAL & SURGICAL HISTORY:  Gout  not on treatment, no recent flares    No significant past surgical history    24 Hour Events:  No NSx intervention at this time, as evacuation will not likely improve the patient's functional neurological prognosis. Stability scan performed overnight and stable. SBP in the lower range, so nicardipine gtt held w/ bolus given and levo started. Will continue to monitor. On exam, pt’s eyes remained closed and globally aphasic, did not follow commands (even in his native language via ), but Margarita.   Exam:  Neurologic Exam:  Mental status: Patient lays in bed w/ eyes closed shut and mute; BRITTA mentation at this time. Not following commands  Language: BRITTA language at this time. Patient remains globally aphasic  Cranial nerves: Pupils equally round and reactive to light. BRITTA visual fields or nystagmus (patient tightens eyes shut when examiner attempts to force open). BRITTA extraocular muscles. Face appears symmetric at rest. BRITTA hearing (pt does not follow commands)  Motor:  Normal bulk and tone. Strength:    3/5 in RUE  4/5 in LUE  2/5 in RLE  2/5 in LLE   strength: BRITTA  Motor: No tremors or tics noted. Margarita spontaneously  Sensation: Localizes UE b/l (L>R) to noxious trapezius squeeze. No neglect noted. W/d LE b/l to painful nailbed pressure. In addition, grimaces to pain  Coordination: BRITTA (does not follow commands)    Current Medications:  chlorhexidine 4% Liquid 1 Application(s) Topical <User Schedule>  levETIRAcetam  IVPB 500 milliGRAM(s) IV Intermittent every 12 hours  niCARdipine Infusion 5 mG/Hr IV Continuous <Continuous>  pantoprazole  Injectable 40 milliGRAM(s) IV Push daily  sodium chloride 0.9%. 1000 milliLiter(s) IV Continuous <Continuous>    Vital Signs Last 24 Hrs  T(C): 36.7 (05 May 2021 00:00), Max: 37.2 (04 May 2021 16:00)  T(F): 98 (05 May 2021 00:00), Max: 98.9 (04 May 2021 16:00)  HR: 76 (05 May 2021 00:00) (52 - 86)  BP: 121/50 (05 May 2021 00:00) (115/54 - 208/78)  BP(mean): 73 (05 May 2021 00:00) (68 - 115)  RR: 15 (05 May 2021 00:00) (15 - 50)  SpO2: 100% (05 May 2021 00:00) (94% - 100%)     I/Os:  I&O's Detail    03 May 2021 07:01  -  04 May 2021 07:00  --------------------------------------------------------  IN:    NiCARdipine: 22.5 mL  Total IN: 22.5 mL    OUT:    Indwelling Catheter - Urethral (mL): 2950 mL  Total OUT: 2950 mL    Total NET: -2927.5 mL      04 May 2021 07:01  -  05 May 2021 01:00  --------------------------------------------------------  IN:    IV PiggyBack: 600 mL    NiCARdipine: 187.5 mL    sodium chloride 0.9%: 1125 mL    Sodium Chloride 0.9% Bolus: 1000 mL  Total IN: 2912.5 mL    OUT:    Indwelling Catheter - Urethral (mL): 1175 mL  Total OUT: 1175 mL    Total NET: 1737.5 mL    LIVER FUNCTIONS - ( 04 May 2021 04:45 )  Alb: 4.3 g/dL / Pro: 7.0 g/dL / ALK PHOS: 86 U/L / ALT: 10 U/L / AST: 23 U/L / GGT: x                               12.0   7.99  )-----------( 181      ( 04 May 2021 04:45 )             37.9      05-04    134<L>  |  99  |  16  ----------------------------<  111<H>  4.6   |  24  |  0.9    Ca    9.5      04 May 2021 04:45    TPro  7.0  /  Alb  4.3  /  TBili  0.5  /  DBili  x   /  AST  23  /  ALT  10  /  AlkPhos  86  05-04     PT/INR - ( 03 May 2021 13:12 )   PT: 10.90 sec;   INR: 0.95 ratio      PTT - ( 03 May 2021 13:12 )  PTT:30.0 sec  Adult Advanced Hemodynamics Last 24 Hrs  CVP(mm Hg): --  CVP(cm H2O): --  CO: --  CI: --  PA: --  PA(mean): --  PCWP: --  SVR: --  SVRI: --  PVR: --  PVRI: --  CARDIAC MARKERS ( 03 May 2021 13:12 )  x     / <0.01 ng/mL / x     / x     / 4.1 ng/mL    Diagnostics/ Results:  Last CTH: < from: CT Head No Cont (21 @ 15:40) >  IMPRESSION:    Redemonstration of large hyperdense lobar hemorrhage in the bifrontal lobes, right larger than left, as well as smaller foci of hemorrhages in the right occipital and left temporal lobes. There is moderate mass effect resulting in 0.4 cm anterior midline shift to the left. On the same day MRI of the brain, there are findings suggestive of amyloid angiopathy.    Mild scattered bilateral subarachnoid hemorrhages, predominantly in the superior right frontal sulcus.    Small acute infarcts in the left temporal and right parietal lobes seen on the same day MRI are not well appreciated on the CT.    < end of copied text >    Last CTA/MRA: < from: CT Angio Neck w/ IV Cont (21 @ 14:10) >  IMPRESSION:    1.  No evidence of acute large vessel occlusion. Normal perfusion images.    2.  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    3.  Moderate (about 60% stenosis) of the right ICA origin.    4.  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    < end of copied text >    Last CTP: < from: CT Angio Neck w/ IV Cont (21 @ 14:10) >  IMPRESSION:    1.  No evidence of acute large vessel occlusion. Normal perfusion images.    2.  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    3.  Moderate (about 60% stenosis) of the right ICA origin.    4.  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    < end of copied text >    Last MRI: < from: MR Head w/wo IV Cont (21 @ 14:34) >  IMPRESSION:    New large bifrontal lobar hemorrhage with hematocrit level, right larger than left, since the prior CT from 5/3/2021. Additional smaller focus of hemorrhage in the right occipital lobe. Also noted are multiple scattered punctate susceptibility signals in the bilateral cerebral hemispheres. These constellations of findings are suspicious for lobar hemorrhage associated with amyloid angiopathy. Associated moderate surrounding edema and mass effect, with leftward midline shift measuring 0.4 cm.    Small foci of acute infarct in the left temporal and right parietal lobes.    Linear signal abnormality and enhancement along the right superior cerebral sulcus and precentral sulci could reflect subarachnoid hemorrhage.    < end of copied text >    Last TCD: n/a    Last EEG: < from: EEG (21 @ 12:30) >  PDR  Continuous  Background: 7 hz    Generalized Slowing  Yes  mild - moderate    Focal Slowing  Yes  Left  moderate  hemisphere    Breach Artifact:  No    Activation Procedure  Hyper Ventilation  No    Photic Stimulation  No    Epileptiform Activity  No    Events:  No    Impression:  Abnormal due to the presence of: focal slowing as above, generalized slowing as above    Clinical Correlation & Recommendations  Consistent with diffuse cerebral electrophysiological dysfunction with suggestion of greater left hemispheric involvement secondary to nonspecific cause.    < end of copied text >    Last Echo: < from: TTE Echo Complete w/o Contrast w/ Doppler (21 @ 10:16) >  Summary:   1. Normal global left ventricular systolic function.   2. LV Ejection Fraction by Solomon's Method with a biplane EF of 72 %.   3. Mild left ventricular hypertrophy.   4. Mildly increased LV wall thickness.   5. Normal left ventricular internal cavity size.   6. Normal left atrial size.   7. Normal right atrial size.   8. There is no evidence of pericardial effusion.   9. Mild mitral annular calcification.  10. Mild mitral valve regurgitation.  11. Mild thickening of the anterior and posterior mitral valve leaflets.  12. Mild tricuspid regurgitation.  13. Mild to moderate aortic regurgitation.  14. Color flow doppler and intravenous injection of agitated saline demonstrates the presence of an intact intra atrial septum.    < end of copied text >    Last EKG: < from: 12 Lead ECG (21 @ 14:15) >  Diagnosis Line Normal sinus rhythm  Normal ECG    < end of copied text >    Chest Xray: < from: Xray Chest 1 View AP/PA (21 @ 14:32) >  Impression:    No radiographic evidence of acute cardiopulmonary disease.    < end of copied text >    ROS:  [X] A ten-point ROS was otherwise negative except as noted in HPI    Assessment:  85 y/o M w/ PMH gout and family history of stroke p/w acute onset aphasia, L gaze preference, R HP, and was in hypertensive emergency. Stroke code called; initial NIHSS 18. Initial CTH w/o pathology. Pt w/in window for tPA (labetalol given for HTN). MRI and repeat CT consistent with bifrontal bilateral hemorrhages (without clinical worsening), which are likely 2/2 to hemorrhagic conversion of infarction after administration of TPA. No NSx intervention at this time, as evacuation will not likely improve the patient's functional neurological prognosis. Stability scan performed overnight and stable. SBP in the lower range, so nicardipine gtt held w/ bolus given. Will continue to monitor. On exam, pt’s eyes remained closed and globally aphasic, did not follow commands (even in his native language via ), but Margarita.     Plan:  Neurology:  - Repeat CTH to assess lethargy  -Neuro checks Q2; (new b/l hemorrhages). STAT CTH for acute change in neurological status  -NSx consult; no surgical intervention at this time, as evacuation will likely not improve the patient's functional neurological prognosis  -C/w Keppra 500 BID; seizure ppx  -Avoid AC/AP; bleeds likely secondary to hemorrhagic conversion of infarction after administration of TPA    Respiratory:  -Keep HOB > 35; aspiration precautions     Cardiology:  -Keep SBP goal 120-150; titrate nicardipine gtt PRN    GI/Nutrition:  - Nutrition consult for TF  -Bowel Regimen ->n/a  -PPx -> Pantoprazole     / Renal/ Fluids/ Electrolytes:  -Mildly hyponatremic; start 3% saline  -Monitor and treat lytes; Na+ last 134  -Keep euvolemic    Hematology/ Oncology:  -DVT PPx -> Heparin SQ    Lovenox SQ    Heparin gtt     SCDs    Infectious Disease:  -Monitor and treat fevers; tylenol PRN    Musculoskeletal  -Bedrest    Endocrine:  -Lipitor when able for PO    Tubes/ Lines/ Drains:  Central    Peripheral    A-line    Olivares    NGT/ OGT    Chest    EVD    Disposition:  Continue medical management as per primary team in:   ICU   CCU   SICU   Stroke Unit   Stepdown    CODE STATUS w/ Next of Kin:   DNI   DNR   FULL    Patient seen and case discussed with NCC attending; see attending attestation  Please call w/ questions  Lorie Nam NP  [id number]

## 2021-05-05 NOTE — PROGRESS NOTE ADULT - ASSESSMENT
Impression      Acute ischemic stroke sp tpa still aphasic repeat head CT bleed bifrontal/ shift 0.4    - taper nicardipine  - Goal BP <180/105 mmHg  - No antiplatelet or antithrombotic med for 24 hrs  - TTE  - Lipid panel and A1C  -  F/UP EEG  - IVF ( hpertonic per neuro)  - ngt  - DVT prophylaxis  - poor prongnosis

## 2021-05-05 NOTE — CONSULT NOTE ADULT - SUBJECTIVE AND OBJECTIVE BOX
86 year old right handed gentleman with no significant PMH presented to the ED with his granddaughter. Patient last known well at 11:30 AM. Patient found to be aphasic with right sided weakness at around noon. pt admitted 5/3.  Stroke code and code NI called on arrival. Patient with NIHSS 18. CTH without intracranial pathology.  Patient given IV TPA at 13:23. He was hypertensive on presentation () and was given Labetalol 20 mg IVP and started on nicardipine drip (now off) called for MICU.    Pt had MRI Brain which showed < from: MR Head w/wo IV Cont (05.04.21 @ 14:34) >  New large bifrontal lobar hemorrhage with hematocrit level, right larger than left, since the prior CT from 5/3/2021. Additional smaller focus of hemorrhage in the right occipital lobe. Also noted are multiple scattered punctate susceptibility signals in the bilateral cerebral hemispheres. These constellations of findings are suspicious for lobar hemorrhage associated with amyloid angiopathy. Associated moderate surrounding edema and mass effect, with leftward midline shift measuring 0.4 cm.  Small foci of acute infarct in the left temporal and right parietal lobes.  Linear signal abnormality and enhancement along the right superior cerebral sulcus and precentral sulci could reflect subarachnoid hemorrhage.  < end of copied text >    rpt HCT (05.04.21 @ 15:40) >  Redemonstration of large hyperdense lobar hemorrhage in the bifrontal lobes, right larger than left, as well as smaller foci of hemorrhages in the right occipital and left temporal lobes. There is moderate mass effect resulting in 0.4 cm anterior midline shift to the left. On the same day MRI of the brain, there are findings suggestive of amyloid angiopathy.  Mild scattered bilateral subarachnoid hemorrhages, predominantly in the superior right frontal sulcus.  Small acute infarcts in the left temporal and right parietal lobes seen on the same day MRI are not well appreciated on the CT.  < end of copied text >    Swallow eval this morning:  · Diagnosis	mentation negatively impacts swallow function. pt did accept ~2oz of puree and ~2oz of thin liquids however required max cues over the length of 30 min    Recommendations:  · Date	04-May-2021  · Recommended Consistencies	pt can have small amounts of water and applesauce with RN only. no diet recommendations at this time    Vital Signs Last 24 Hrs  T(C): 36.6 (05 May 2021 08:00), Max: 37.2 (04 May 2021 16:00)  T(F): 97.8 (05 May 2021 08:00), Max: 98.9 (04 May 2021 16:00)  HR: 70 (05 May 2021 10:00) (52 - 98)  BP: 137/58 (05 May 2021 10:00) (105/50 - 193/72)  BP(mean): 85 (05 May 2021 10:00) (15 - 117)  RR: 18 (05 May 2021 10:00) (15 - 32)  SpO2: 100% (05 May 2021 10:00) (94% - 100%)  Drug Dosing Weight  Height (cm): 157.7 (03 May 2021 20:30)  Weight (kg): 53 (03 May 2021 20:30)  BMI (kg/m2): 21.3 (03 May 2021 20:30)  BSA (m2): 1.52 (03 May 2021 20:30)  sluggish but can be wakened  skin turgor good, but skin on LE dry with some scaling  abd soft, ND, NT  + proper NG tube placed by nurse - spoke w RN at bedside - awaiting position clorification on XR  no LE edema, no c/c    MEDICATIONS  (STANDING):  chlorhexidine 4% Liquid 1 Application(s) Topical <User Schedule>  levETIRAcetam  IVPB 500 milliGRAM(s) IV Intermittent every 12 hours  niCARdipine Infusion 5 mG/Hr (25 mL/Hr) IV Continuous <Continuous>  pantoprazole  Injectable 40 milliGRAM(s) IV Push daily  sodium chloride 3%. 500 milliLiter(s) (30 mL/Hr) IV Continuous <Continuous>                        11.7   8.20  )-----------( 168      ( 05 May 2021 04:33 )             37.9   05-05    133<L>  |  100  |  13  ----------------------------<  95  4.0   |  21  |  0.7    Ca    8.7      05 May 2021 04:33  Mg     1.8     05-05    TPro  6.5  /  Alb  4.0  /  TBili  0.6  /  DBili  x   /  AST  24  /  ALT  9   /  AlkPhos  80  05-05

## 2021-05-05 NOTE — PHYSICAL THERAPY INITIAL EVALUATION ADULT - GENERAL OBSERVATIONS, REHAB EVAL
[id number] am. 85 y/o M rec'd/left in bed, + IV, NGT, arreola, sequentials. pt did not open eyes with VC/TC's, did have AROM LT UE into full shldr flex. vitals: 131/64 91 100% RR 15. [id number] am. 87 y/o Gujarati-speaking M rec'd/left in bed, + IV, NGT, arreola, sequentials. pt did not open eyes with VC/TC's, did have AROM LT UE into full shldr flex. vitals: 131/64 91 100% RR 15.

## 2021-05-06 LAB
ANION GAP SERPL CALC-SCNC: 11 MMOL/L — SIGNIFICANT CHANGE UP (ref 7–14)
ANION GAP SERPL CALC-SCNC: 12 MMOL/L — SIGNIFICANT CHANGE UP (ref 7–14)
ANION GAP SERPL CALC-SCNC: 15 MMOL/L — HIGH (ref 7–14)
ANION GAP SERPL CALC-SCNC: 8 MMOL/L — SIGNIFICANT CHANGE UP (ref 7–14)
ANION GAP SERPL CALC-SCNC: 9 MMOL/L — SIGNIFICANT CHANGE UP (ref 7–14)
BASOPHILS # BLD AUTO: 0.03 K/UL — SIGNIFICANT CHANGE UP (ref 0–0.2)
BASOPHILS NFR BLD AUTO: 0.4 % — SIGNIFICANT CHANGE UP (ref 0–1)
BUN SERPL-MCNC: 16 MG/DL — SIGNIFICANT CHANGE UP (ref 10–20)
BUN SERPL-MCNC: 19 MG/DL — SIGNIFICANT CHANGE UP (ref 10–20)
BUN SERPL-MCNC: 21 MG/DL — HIGH (ref 10–20)
BUN SERPL-MCNC: 21 MG/DL — HIGH (ref 10–20)
BUN SERPL-MCNC: 22 MG/DL — HIGH (ref 10–20)
CALCIUM SERPL-MCNC: 8.7 MG/DL — SIGNIFICANT CHANGE UP (ref 8.5–10.1)
CALCIUM SERPL-MCNC: 8.8 MG/DL — SIGNIFICANT CHANGE UP (ref 8.5–10.1)
CALCIUM SERPL-MCNC: 9 MG/DL — SIGNIFICANT CHANGE UP (ref 8.5–10.1)
CALCIUM SERPL-MCNC: 9 MG/DL — SIGNIFICANT CHANGE UP (ref 8.5–10.1)
CALCIUM SERPL-MCNC: 9.2 MG/DL — SIGNIFICANT CHANGE UP (ref 8.5–10.1)
CHLORIDE SERPL-SCNC: 107 MMOL/L — SIGNIFICANT CHANGE UP (ref 98–110)
CHLORIDE SERPL-SCNC: 111 MMOL/L — HIGH (ref 98–110)
CHLORIDE SERPL-SCNC: 111 MMOL/L — HIGH (ref 98–110)
CHLORIDE SERPL-SCNC: 112 MMOL/L — HIGH (ref 98–110)
CHLORIDE SERPL-SCNC: 113 MMOL/L — HIGH (ref 98–110)
CO2 SERPL-SCNC: 17 MMOL/L — SIGNIFICANT CHANGE UP (ref 17–32)
CO2 SERPL-SCNC: 22 MMOL/L — SIGNIFICANT CHANGE UP (ref 17–32)
CO2 SERPL-SCNC: 23 MMOL/L — SIGNIFICANT CHANGE UP (ref 17–32)
CREAT SERPL-MCNC: 0.7 MG/DL — SIGNIFICANT CHANGE UP (ref 0.7–1.5)
CREAT SERPL-MCNC: 0.8 MG/DL — SIGNIFICANT CHANGE UP (ref 0.7–1.5)
EOSINOPHIL # BLD AUTO: 0.31 K/UL — SIGNIFICANT CHANGE UP (ref 0–0.7)
EOSINOPHIL NFR BLD AUTO: 4.1 % — SIGNIFICANT CHANGE UP (ref 0–8)
GLUCOSE SERPL-MCNC: 117 MG/DL — HIGH (ref 70–99)
GLUCOSE SERPL-MCNC: 117 MG/DL — HIGH (ref 70–99)
GLUCOSE SERPL-MCNC: 128 MG/DL — HIGH (ref 70–99)
GLUCOSE SERPL-MCNC: 90 MG/DL — SIGNIFICANT CHANGE UP (ref 70–99)
GLUCOSE SERPL-MCNC: 92 MG/DL — SIGNIFICANT CHANGE UP (ref 70–99)
HCT VFR BLD CALC: 36.4 % — LOW (ref 42–52)
HGB BLD-MCNC: 11.6 G/DL — LOW (ref 14–18)
IMM GRANULOCYTES NFR BLD AUTO: 0.1 % — SIGNIFICANT CHANGE UP (ref 0.1–0.3)
LYMPHOCYTES # BLD AUTO: 0.98 K/UL — LOW (ref 1.2–3.4)
LYMPHOCYTES # BLD AUTO: 12.9 % — LOW (ref 20.5–51.1)
MAGNESIUM SERPL-MCNC: 2.1 MG/DL — SIGNIFICANT CHANGE UP (ref 1.8–2.4)
MCHC RBC-ENTMCNC: 25 PG — LOW (ref 27–31)
MCHC RBC-ENTMCNC: 31.9 G/DL — LOW (ref 32–37)
MCV RBC AUTO: 78.4 FL — LOW (ref 80–94)
MONOCYTES # BLD AUTO: 0.96 K/UL — HIGH (ref 0.1–0.6)
MONOCYTES NFR BLD AUTO: 12.6 % — HIGH (ref 1.7–9.3)
NEUTROPHILS # BLD AUTO: 5.33 K/UL — SIGNIFICANT CHANGE UP (ref 1.4–6.5)
NEUTROPHILS NFR BLD AUTO: 69.9 % — SIGNIFICANT CHANGE UP (ref 42.2–75.2)
NRBC # BLD: 0 /100 WBCS — SIGNIFICANT CHANGE UP (ref 0–0)
PHOSPHATE SERPL-MCNC: 1.7 MG/DL — LOW (ref 2.1–4.9)
PLATELET # BLD AUTO: 176 K/UL — SIGNIFICANT CHANGE UP (ref 130–400)
POTASSIUM SERPL-MCNC: 3.5 MMOL/L — SIGNIFICANT CHANGE UP (ref 3.5–5)
POTASSIUM SERPL-MCNC: 3.5 MMOL/L — SIGNIFICANT CHANGE UP (ref 3.5–5)
POTASSIUM SERPL-MCNC: 3.7 MMOL/L — SIGNIFICANT CHANGE UP (ref 3.5–5)
POTASSIUM SERPL-MCNC: 3.7 MMOL/L — SIGNIFICANT CHANGE UP (ref 3.5–5)
POTASSIUM SERPL-MCNC: 3.9 MMOL/L — SIGNIFICANT CHANGE UP (ref 3.5–5)
POTASSIUM SERPL-SCNC: 3.5 MMOL/L — SIGNIFICANT CHANGE UP (ref 3.5–5)
POTASSIUM SERPL-SCNC: 3.5 MMOL/L — SIGNIFICANT CHANGE UP (ref 3.5–5)
POTASSIUM SERPL-SCNC: 3.7 MMOL/L — SIGNIFICANT CHANGE UP (ref 3.5–5)
POTASSIUM SERPL-SCNC: 3.7 MMOL/L — SIGNIFICANT CHANGE UP (ref 3.5–5)
POTASSIUM SERPL-SCNC: 3.9 MMOL/L — SIGNIFICANT CHANGE UP (ref 3.5–5)
RBC # BLD: 4.64 M/UL — LOW (ref 4.7–6.1)
RBC # FLD: 15 % — HIGH (ref 11.5–14.5)
SODIUM SERPL-SCNC: 139 MMOL/L — SIGNIFICANT CHANGE UP (ref 135–146)
SODIUM SERPL-SCNC: 142 MMOL/L — SIGNIFICANT CHANGE UP (ref 135–146)
SODIUM SERPL-SCNC: 142 MMOL/L — SIGNIFICANT CHANGE UP (ref 135–146)
SODIUM SERPL-SCNC: 145 MMOL/L — SIGNIFICANT CHANGE UP (ref 135–146)
SODIUM SERPL-SCNC: 147 MMOL/L — HIGH (ref 135–146)
WBC # BLD: 7.62 K/UL — SIGNIFICANT CHANGE UP (ref 4.8–10.8)
WBC # FLD AUTO: 7.62 K/UL — SIGNIFICANT CHANGE UP (ref 4.8–10.8)

## 2021-05-06 PROCEDURE — 71045 X-RAY EXAM CHEST 1 VIEW: CPT | Mod: 26

## 2021-05-06 PROCEDURE — 99232 SBSQ HOSP IP/OBS MODERATE 35: CPT

## 2021-05-06 RX ORDER — ENOXAPARIN SODIUM 100 MG/ML
40 INJECTION SUBCUTANEOUS DAILY
Refills: 0 | Status: DISCONTINUED | OUTPATIENT
Start: 2021-05-06 | End: 2021-05-12

## 2021-05-06 RX ORDER — ENOXAPARIN SODIUM 100 MG/ML
40 INJECTION SUBCUTANEOUS ONCE
Refills: 0 | Status: DISCONTINUED | OUTPATIENT
Start: 2021-05-06 | End: 2021-05-06

## 2021-05-06 RX ORDER — AMLODIPINE BESYLATE 2.5 MG/1
5 TABLET ORAL DAILY
Refills: 0 | Status: DISCONTINUED | OUTPATIENT
Start: 2021-05-06 | End: 2021-05-08

## 2021-05-06 RX ADMIN — PANTOPRAZOLE SODIUM 40 MILLIGRAM(S): 20 TABLET, DELAYED RELEASE ORAL at 11:37

## 2021-05-06 RX ADMIN — LEVETIRACETAM 420 MILLIGRAM(S): 250 TABLET, FILM COATED ORAL at 17:42

## 2021-05-06 RX ADMIN — SODIUM CHLORIDE 30 MILLILITER(S): 5 INJECTION, SOLUTION INTRAVENOUS at 05:05

## 2021-05-06 RX ADMIN — LEVETIRACETAM 420 MILLIGRAM(S): 250 TABLET, FILM COATED ORAL at 05:04

## 2021-05-06 RX ADMIN — NICARDIPINE HYDROCHLORIDE 25 MG/HR: 30 CAPSULE, EXTENDED RELEASE ORAL at 05:04

## 2021-05-06 RX ADMIN — CHLORHEXIDINE GLUCONATE 1 APPLICATION(S): 213 SOLUTION TOPICAL at 05:04

## 2021-05-06 RX ADMIN — AMLODIPINE BESYLATE 5 MILLIGRAM(S): 2.5 TABLET ORAL at 09:31

## 2021-05-06 NOTE — PROGRESS NOTE ADULT - SUBJECTIVE AND OBJECTIVE BOX
Specialty: Neurocritical Care     HPI: 86 year old right handed gentleman with no significant PMH presented to the ED with his granddaughter. Patient last known well at 11:30 AM. Patient found to be aphasic with right sided weakness at around noon.   Stroke code and code NI called on arrival. Patient with NIHSS 18. CTH without intracranial pathology.  Patient given IV TPA at 13:23. He was hypertensive on presentation () and was given Labetalol 20 mg IVP and started on nicardipine drip (now off) called for MICU  (03 May 2021 16:17)      Past Medical and Surgical Hx:  PAST MEDICAL & SURGICAL HISTORY:  Gout  not on treatment, no recent flares    No significant past surgical history        Allergies: No Known Allergies      ROS: Unable to assess due to patient neurologic status.     Physical Exam: (physical therapist spoke the same dialect and assisted with this exam)  Neurological:  Mental Status: Spontaneously opens eyes to voice and to noxious stimuli when applied and not oriented to person, place, or time. Affect appropriate. Able to follow simple commands "hold up your left arm" when used with dialect   Language: mute, intermittently speaks   Cranial Nerves:  Pupils measure 3mm round, reactive to light b/l. No cranial nerve palsy or nystagmus noted.  V: corneal reflex intact bilaterally  VII: face symmetrical at rest  Motor: Normal muscle bulk/tone. Strength 3/5 upper right extremity and right lower extremity. LUE 4/5 antigravity. LLE with spontaneous movement. W/d LE b/l to painful nailbed pressure.   Sensory: Sensation to light touch, pain intact to trunk and bilaterally to both upper and lower extremities. In addition, grimaces to pain.    Vital Signs:  ICU Vital Signs Last 24 Hrs  T(C): 36.2 (06 May 2021 12:00), Max: 36.5 (06 May 2021 08:00)  T(F): 97.1 (06 May 2021 12:00), Max: 97.7 (06 May 2021 08:00)  HR: 82 (06 May 2021 14:30) (74 - 128)  BP: 148/57 (06 May 2021 14:30) (116/54 - 179/65)  BP(mean): 85 (06 May 2021 14:30) (74 - 121)  ABP: --  ABP(mean): --  RR: 29 (06 May 2021 14:30) (13 - 39)  SpO2: 100% (06 May 2021 14:30) (99% - 100%)        I/Os:  I&O's Detail    05 May 2021 07:01  -  06 May 2021 07:00  --------------------------------------------------------  IN:    IV PiggyBack: 100 mL    NiCARdipine: 250 mL    sodium chloride 0.9%: 150 mL    sodium chloride 3%: 480 mL  Total IN: 980 mL    OUT:    Indwelling Catheter - Urethral (mL): 2055 mL  Total OUT: 2055 mL    Total NET: -1075 mL      06 May 2021 07:01  -  06 May 2021 14:57  --------------------------------------------------------  IN:    NiCARdipine: 125 mL    sodium chloride 3%: 240 mL  Total IN: 365 mL    OUT:    Indwelling Catheter - Urethral (mL): 675 mL  Total OUT: 675 mL    Total NET: -310 mL          Labs:  05-06    142  |  111<H>  |  21<H>  ----------------------------<  90  3.7   |  23  |  0.8    Ca    8.8      06 May 2021 09:44  Phos  1.7     05-06  Mg     2.1     05-06    TPro  6.5  /  Alb  4.0  /  TBili  0.6  /  DBili  x   /  AST  24  /  ALT  9   /  AlkPhos  80  05-05    CBC Full  -  ( 06 May 2021 04:50 )  WBC Count : 7.62 K/uL  RBC Count : 4.64 M/uL  Hemoglobin : 11.6 g/dL  Hematocrit : 36.4 %  Platelet Count - Automated : 176 K/uL  Mean Cell Volume : 78.4 fL  Mean Cell Hemoglobin : 25.0 pg  Mean Cell Hemoglobin Concentration : 31.9 g/dL  Auto Neutrophil # : 5.33 K/uL  Auto Lymphocyte # : 0.98 K/uL  Auto Monocyte # : 0.96 K/uL  Auto Eosinophil # : 0.31 K/uL  Auto Basophil # : 0.03 K/uL  Auto Neutrophil % : 69.9 %  Auto Lymphocyte % : 12.9 %  Auto Monocyte % : 12.6 %  Auto Eosinophil % : 4.1 %  Auto Basophil % : 0.4 %      LIVER FUNCTIONS - ( 05 May 2021 04:33 )  Alb: 4.0 g/dL / Pro: 6.5 g/dL / ALK PHOS: 80 U/L / ALT: 9 U/L / AST: 24 U/L / GGT: x               Microbiology:        Medications Current and PRN:  MEDICATIONS  (STANDING):  amLODIPine   Tablet 5 milliGRAM(s) Oral daily  chlorhexidine 4% Liquid 1 Application(s) Topical <User Schedule>  levETIRAcetam  IVPB 500 milliGRAM(s) IV Intermittent every 12 hours  niCARdipine Infusion 5 mG/Hr (25 mL/Hr) IV Continuous <Continuous>  pantoprazole  Injectable 40 milliGRAM(s) IV Push daily  sodium chloride 3%. 500 milliLiter(s) (30 mL/Hr) IV Continuous <Continuous>    MEDICATIONS  (PRN):      Radiology:  CT Head No Contrast 5/5/2021  IMPRESSION:  Since 5/5/2021 12:10 PM:  Again noted multiple stable bilateral lobar hematomas, with associated stable mass effect from the right frontal hematoma with associated 0.4 cmmidline shift to the left.  Stable examination.    Chest X-ray 1 View  Impression:  No radiographic evidence of acute cardiopulmonary disease.  Support devices as described.      Assessment: 86 year old M who presented with acute onset aphasia, left gaze preference, right side hemiparesis, and in hypertensive emergency. Stroke code activated on May 3rd, and is now s/p TPA day # 3. MRI and repeat CT consistent with bifrontal bilateral hemorrhages (without clinical worsening), which are likely 2/2 to hemorrhagic conversion of infarction after administration of TPA. No NSx intervention at this time, as evacuation will not likely improve the patient's functional neurological prognosis. Stability scan performed overnight and stable. On exam, pt’s eyes remained closed and globally aphasic, did follow simple commands with the physical therapist today and is able to move all extremities spontaneously       Plan:  #Neuro:  - neurochecks Q1   - Video EEG  - continue with Keppra   - avoid anticoagulation    #CV:  - Keep normotensive    #Renal/Fluid/Electolytes:  - Strict I/Os  - Daily weights  - Keep euvolemic  - Monitor lytes, replete as needed    #GI:  - speech and swallow    #Heme/Onc:  - SCD's while in bed  - ok for DVT prophylaxis     #ID:  - Keep normothermic        SHELBY Michael  Please feel free to contact for any questions or concerns. Thank you.  Extension: #[phone]   Specialty: Neurocritical Care     HPI: 86 year old right handed gentleman with no significant PMH presented to the ED with his granddaughter. Patient last known well at 11:30 AM. Patient found to be aphasic with right sided weakness at around noon.   Stroke code and code NI called on arrival. Patient with NIHSS 18. CTH without intracranial pathology.  Patient given IV TPA at 13:23. He was hypertensive on presentation () and was given Labetalol 20 mg IVP and started on nicardipine drip (now off) called for MICU  (03 May 2021 16:17)      Past Medical and Surgical Hx:  PAST MEDICAL & SURGICAL HISTORY:  Gout  not on treatment, no recent flares    No significant past surgical history        Allergies: No Known Allergies      ROS: Unable to assess due to patient neurologic status.     Physical Exam: (physical therapist spoke the same dialect and assisted with this exam)  Neurological:  Mental Status: Spontaneously opens eyes to voice and to noxious stimuli when applied and not oriented to person, place, or time. Affect appropriate. Able to follow simple commands "hold up your left arm" when used with dialect   Language: mute, intermittently speaks   Cranial Nerves:  Pupils measure 3mm round, reactive to light b/l. No cranial nerve palsy or nystagmus noted.  V: corneal reflex intact bilaterally  VII: face symmetrical at rest  Motor: Normal muscle bulk/tone. Strength 3/5 upper right extremity and right lower extremity. LUE 4/5 antigravity. LLE with spontaneous movement. W/d LE b/l to painful nailbed pressure.   Sensory: Sensation to light touch, pain intact to trunk and bilaterally to both upper and lower extremities. In addition, grimaces to pain.    Vital Signs:  ICU Vital Signs Last 24 Hrs  T(C): 36.2 (06 May 2021 12:00), Max: 36.5 (06 May 2021 08:00)  T(F): 97.1 (06 May 2021 12:00), Max: 97.7 (06 May 2021 08:00)  HR: 82 (06 May 2021 14:30) (74 - 128)  BP: 148/57 (06 May 2021 14:30) (116/54 - 179/65)  BP(mean): 85 (06 May 2021 14:30) (74 - 121)  ABP: --  ABP(mean): --  RR: 29 (06 May 2021 14:30) (13 - 39)  SpO2: 100% (06 May 2021 14:30) (99% - 100%)        I/Os:  I&O's Detail    05 May 2021 07:01  -  06 May 2021 07:00  --------------------------------------------------------  IN:    IV PiggyBack: 100 mL    NiCARdipine: 250 mL    sodium chloride 0.9%: 150 mL    sodium chloride 3%: 480 mL  Total IN: 980 mL    OUT:    Indwelling Catheter - Urethral (mL): 2055 mL  Total OUT: 2055 mL    Total NET: -1075 mL      06 May 2021 07:01  -  06 May 2021 14:57  --------------------------------------------------------  IN:    NiCARdipine: 125 mL    sodium chloride 3%: 240 mL  Total IN: 365 mL    OUT:    Indwelling Catheter - Urethral (mL): 675 mL  Total OUT: 675 mL    Total NET: -310 mL          Labs:  05-06    142  |  111<H>  |  21<H>  ----------------------------<  90  3.7   |  23  |  0.8    Ca    8.8      06 May 2021 09:44  Phos  1.7     05-06  Mg     2.1     05-06    TPro  6.5  /  Alb  4.0  /  TBili  0.6  /  DBili  x   /  AST  24  /  ALT  9   /  AlkPhos  80  05-05    CBC Full  -  ( 06 May 2021 04:50 )  WBC Count : 7.62 K/uL  RBC Count : 4.64 M/uL  Hemoglobin : 11.6 g/dL  Hematocrit : 36.4 %  Platelet Count - Automated : 176 K/uL  Mean Cell Volume : 78.4 fL  Mean Cell Hemoglobin : 25.0 pg  Mean Cell Hemoglobin Concentration : 31.9 g/dL  Auto Neutrophil # : 5.33 K/uL  Auto Lymphocyte # : 0.98 K/uL  Auto Monocyte # : 0.96 K/uL  Auto Eosinophil # : 0.31 K/uL  Auto Basophil # : 0.03 K/uL  Auto Neutrophil % : 69.9 %  Auto Lymphocyte % : 12.9 %  Auto Monocyte % : 12.6 %  Auto Eosinophil % : 4.1 %  Auto Basophil % : 0.4 %      LIVER FUNCTIONS - ( 05 May 2021 04:33 )  Alb: 4.0 g/dL / Pro: 6.5 g/dL / ALK PHOS: 80 U/L / ALT: 9 U/L / AST: 24 U/L / GGT: x               Microbiology:        Medications Current and PRN:  MEDICATIONS  (STANDING):  amLODIPine   Tablet 5 milliGRAM(s) Oral daily  chlorhexidine 4% Liquid 1 Application(s) Topical <User Schedule>  levETIRAcetam  IVPB 500 milliGRAM(s) IV Intermittent every 12 hours  niCARdipine Infusion 5 mG/Hr (25 mL/Hr) IV Continuous <Continuous>  pantoprazole  Injectable 40 milliGRAM(s) IV Push daily  sodium chloride 3%. 500 milliLiter(s) (30 mL/Hr) IV Continuous <Continuous>    MEDICATIONS  (PRN):      Radiology:  CT Head No Contrast 5/5/2021  IMPRESSION:  Since 5/5/2021 12:10 PM:  Again noted multiple stable bilateral lobar hematomas, with associated stable mass effect from the right frontal hematoma with associated 0.4 cmmidline shift to the left.  Stable examination.    Chest X-ray 1 View  Impression:  No radiographic evidence of acute cardiopulmonary disease.  Support devices as described.      Assessment: 86 year old M who presented with acute onset aphasia, left gaze preference, right side hemiparesis, and in hypertensive emergency. Stroke code activated on May 3rd, and is now s/p TPA day # 3. MRI and repeat CT consistent with bifrontal bilateral hemorrhages (without clinical worsening), which are likely 2/2 to hemorrhagic conversion of infarction after administration of TPA. No NSx intervention at this time, as evacuation will not likely improve the patient's functional neurological prognosis. Stability scan performed overnight and stable. On exam, pt’s eyes remained closed and globally aphasic, did follow simple commands with the physical therapist today and is able to move all extremities spontaneously.       Plan:  #Neuro:  - neurochecks Q1   - Video EEG  - continue with Keppra   - avoid anticoagulation    #CV:  - Keep normotensive    #Renal/Fluid/Electolytes:  - Strict I/Os  - Daily weights  - Keep euvolemic  - Monitor lytes, replete as needed    #GI:  - as per primary team     #Heme/Onc:  - SCD's while in bed  - ok for DVT prophylaxis     #ID:  - Keep normothermic        SHELBY Michael  Please feel free to contact for any questions or concerns. Thank you. See attending attestation.  Extension: #[phone]   Specialty: Neurocritical Care     HPI: 86 year old right handed gentleman with no significant PMH presented to the ED with his granddaughter. Patient last known well at 11:30 AM. Patient found to be aphasic with right sided weakness at around noon.   Stroke code and code NI called on arrival. Patient with NIHSS 18. CTH without intracranial pathology.  Patient given IV TPA at 13:23. He was hypertensive on presentation () and was given Labetalol 20 mg IVP and started on nicardipine drip (now off) called for MICU  (03 May 2021 16:17)      Past Medical and Surgical Hx:  PAST MEDICAL & SURGICAL HISTORY:  Gout  not on treatment, no recent flares    No significant past surgical history      Allergies: No Known Allergies      ROS: Unable to assess due to patient neurologic status.     Physical Exam: (physical therapist spoke the same dialect and assisted with this exam)  Neurological:  Mental Status: Spontaneously opens eyes to voice and to noxious stimuli when applied and not oriented to person, place, or time. Affect appropriate. Able to follow simple commands "hold up your left arm" when used with dialect   Language: mute, intermittently speaks   Cranial Nerves:  Pupils measure 3mm round, reactive to light b/l. No cranial nerve palsy or nystagmus noted.  V: corneal reflex intact bilaterally  VII: face symmetrical at rest  Motor: Normal muscle bulk/tone. Strength 3/5 upper right extremity and right lower extremity. LUE 4/5 antigravity. LLE with spontaneous movement. W/d LE b/l to painful nailbed pressure.   Sensory: Sensation to light touch, pain intact to trunk and bilaterally to both upper and lower extremities. In addition, grimaces to pain.    Vital Signs:  ICU Vital Signs Last 24 Hrs  T(C): 36.2 (06 May 2021 12:00), Max: 36.5 (06 May 2021 08:00)  T(F): 97.1 (06 May 2021 12:00), Max: 97.7 (06 May 2021 08:00)  HR: 82 (06 May 2021 14:30) (74 - 128)  BP: 148/57 (06 May 2021 14:30) (116/54 - 179/65)  BP(mean): 85 (06 May 2021 14:30) (74 - 121)  ABP: --  ABP(mean): --  RR: 29 (06 May 2021 14:30) (13 - 39)  SpO2: 100% (06 May 2021 14:30) (99% - 100%)        I/Os:  I&O's Detail    05 May 2021 07:01  -  06 May 2021 07:00  --------------------------------------------------------  IN:    IV PiggyBack: 100 mL    NiCARdipine: 250 mL    sodium chloride 0.9%: 150 mL    sodium chloride 3%: 480 mL  Total IN: 980 mL    OUT:    Indwelling Catheter - Urethral (mL): 2055 mL  Total OUT: 2055 mL    Total NET: -1075 mL      06 May 2021 07:01  -  06 May 2021 14:57  --------------------------------------------------------  IN:    NiCARdipine: 125 mL    sodium chloride 3%: 240 mL  Total IN: 365 mL    OUT:    Indwelling Catheter - Urethral (mL): 675 mL  Total OUT: 675 mL    Total NET: -310 mL          Labs:  05-06    142  |  111<H>  |  21<H>  ----------------------------<  90  3.7   |  23  |  0.8    Ca    8.8      06 May 2021 09:44  Phos  1.7     05-06  Mg     2.1     05-06    TPro  6.5  /  Alb  4.0  /  TBili  0.6  /  DBili  x   /  AST  24  /  ALT  9   /  AlkPhos  80  05-05    CBC Full  -  ( 06 May 2021 04:50 )  WBC Count : 7.62 K/uL  RBC Count : 4.64 M/uL  Hemoglobin : 11.6 g/dL  Hematocrit : 36.4 %  Platelet Count - Automated : 176 K/uL  Mean Cell Volume : 78.4 fL  Mean Cell Hemoglobin : 25.0 pg  Mean Cell Hemoglobin Concentration : 31.9 g/dL  Auto Neutrophil # : 5.33 K/uL  Auto Lymphocyte # : 0.98 K/uL  Auto Monocyte # : 0.96 K/uL  Auto Eosinophil # : 0.31 K/uL  Auto Basophil # : 0.03 K/uL  Auto Neutrophil % : 69.9 %  Auto Lymphocyte % : 12.9 %  Auto Monocyte % : 12.6 %  Auto Eosinophil % : 4.1 %  Auto Basophil % : 0.4 %      LIVER FUNCTIONS - ( 05 May 2021 04:33 )  Alb: 4.0 g/dL / Pro: 6.5 g/dL / ALK PHOS: 80 U/L / ALT: 9 U/L / AST: 24 U/L / GGT: x               Medications Current and PRN:  MEDICATIONS  (STANDING):  amLODIPine   Tablet 5 milliGRAM(s) Oral daily  chlorhexidine 4% Liquid 1 Application(s) Topical <User Schedule>  levETIRAcetam  IVPB 500 milliGRAM(s) IV Intermittent every 12 hours  niCARdipine Infusion 5 mG/Hr (25 mL/Hr) IV Continuous <Continuous>  pantoprazole  Injectable 40 milliGRAM(s) IV Push daily  sodium chloride 3%. 500 milliLiter(s) (30 mL/Hr) IV Continuous <Continuous>    MEDICATIONS  (PRN):      Radiology:  CT Head No Contrast 5/5/2021  IMPRESSION:  Since 5/5/2021 12:10 PM:  Again noted multiple stable bilateral lobar hematomas, with associated stable mass effect from the right frontal hematoma with associated 0.4 cmmidline shift to the left.  Stable examination.    Chest X-ray 1 View  Impression:  No radiographic evidence of acute cardiopulmonary disease.  Support devices as described.    TTE 5/4    Summary:   1. Normal global left ventricular systolic function.   2. LV Ejection Fraction by Solomon's Method with a biplane EF of 72 %.   3. Mild left ventricular hypertrophy.   4. Mildly increased LV wall thickness.   5. Normal left ventricular internal cavity size.   6. Normal left atrial size.   7. Normal right atrial size.   8. There is no evidence of pericardial effusion.   9. Mild mitral annular calcification.  10. Mild mitral valve regurgitation.  11. Mild thickening of the anterior and posterior mitral valve leaflets.  12. Mild tricuspid regurgitation.  13. Mild to moderate aortic regurgitation.  14. Color flow doppler and intravenous injection of agitated saline demonstrates the presence of an intact intra atrial septum.    PHYSICIAN INTERPRETATION:  Left Ventricle: The left ventricular internal cavity size is normal. Left ventricular wall thickness is mildly increased. There is mild left ventricular hypertrophy. Global LV systolic function was normal.  Right Ventricle: The right ventricular size is normal. RV systolic function is normal.  Left Atrium: Normal left atrial size. Color flow doppler and intravenous injection of agitated saline demonstrates the presence of an intact intra atrial septum. No right to left shunt was detected.  Right Atrium: Normal right atrial size.  Pericardium: There is no evidence of pericardial effusion.  Mitral Valve: Mild thickening of the anterior and posterior mitral valve leaflets. There is mild mitral annular calcification. Mild mitral valve regurgitation is seen.  Tricuspid Valve: The tricuspid valve is normal in structure. Mild tricuspid regurgitation is visualized.  Aortic Valve: The aortic valve is trileaflet. No evidence of aortic stenosis. Mild to moderate aortic valve regurgitation is seen.  Pulmonic Valve: The pulmonic valve is thickened with good excursion. No indication of pulmonic valve regurgitation.  Aorta: The aortic root is normal in size and structure.  Venous: The inferior vena cava was normal sized, with respiratory size variation less than 50%.  Shunts: Agitated saline contrast was given intravenously to evaluate for intracardiac shunting.      Assessment: 86 year old M who presented with acute onset aphasia, left gaze preference, right side hemiparesis, and in hypertensive emergency. Stroke code activated on May 3rd, and is now s/p TPA day # 3. MRI and repeat CT consistent with bifrontal bilateral hemorrhages (without clinical worsening), which are likely 2/2 to hemorrhagic conversion of infarction after administration of TPA. No NSx intervention at this time, as evacuation will not likely improve the patient's functional neurological prognosis. Stability scan performed overnight and stable. On exam, pt’s eyes remained closed and globally aphasic, did follow simple commands with the physical therapist today and is able to move all extremities spontaneously.       Plan:  #Neuro:  - neurochecks Q1   - Video EEG  - continue with Keppra for seizure prophylaxis   - avoid anticoagulation due to presence of hemorrhage  - high dose statin       #CV:  - Keep SBP goal 120-150; titrate nicardipine gtt PRN    #Renal/Fluid/Electolytes:  - Strict I/Os  - Daily weights  - Keep euvolemic  - Monitor lytes, replete as needed    #GI:  - as per primary team     #Heme/Onc:  - SCD's while in bed  - ok for DVT prophylaxis     #ID:  - Keep normothermic    Stroke Work-Up  [facility]: performed 5/3/2021  Lipid panel:   Serum Cholesterol: 195  Triglycerides: 129  HDL: 48  Non-HDL Cholesterol: 147  LDL: 133  HgA1C: 6.2  2D echo/TTE: 5/4  A/C: n/a - due to presence of hemorrhage  Statin: to be ordered       SHELBY Michael  Please feel free to contact for any questions or concerns. Thank you. See attending attestation.  Extension: #[phone]

## 2021-05-06 NOTE — PROGRESS NOTE ADULT - SUBJECTIVE AND OBJECTIVE BOX
OVERNIGHT EVENTS: events noted, on hypertonic, cardene 2.5, repeat head CT reviewed    Vital Signs Last 24 Hrs  T(C): 36.5 (06 May 2021 08:00), Max: 37 (05 May 2021 12:00)  T(F): 97.7 (06 May 2021 08:00), Max: 98.6 (05 May 2021 12:00)  HR: 92 (06 May 2021 07:30) (70 - 128)  BP: 155/55 (06 May 2021 07:30) (116/54 - 155/55)  BP(mean): 82 (06 May 2021 07:30) (76 - 113)  RR: 33 (06 May 2021 07:30) (13 - 38)  SpO2: 100% (06 May 2021 07:30) (100% - 100%)    PHYSICAL EXAMINATION:    GENERAL: ill looking, aphasic    HEENT: Head is normocephalic and atraumatic.     NECK: Supple.    LUNGS: dec bs both bases    HEART: Regular rate and rhythm without murmur.    ABDOMEN: Soft, nontender, and nondistended.      EXTREMITIES: Without any cyanosis, clubbing, rash, lesions or edema.    NEUROLOGIC: aphasic, r hemiple    SKIN: No ulceration or induration present.      LABS:                        11.6   7.62  )-----------( 176      ( 06 May 2021 04:50 )             36.4     05-06    142  |  111<H>  |  19  ----------------------------<  92  3.9   |  22  |  0.7    Ca    8.7      06 May 2021 04:50  Phos  1.7     05-06  Mg     2.1     05-06    TPro  6.5  /  Alb  4.0  /  TBili  0.6  /  DBili  x   /  AST  24  /  ALT  9   /  AlkPhos  80  05-05 05-05-21 @ 07:01  -  05-06-21 @ 07:00  --------------------------------------------------------  IN: 980 mL / OUT: 2055 mL / NET: -1075 mL    05-06-21 @ 07:01  -  05-06-21 @ 08:18  --------------------------------------------------------  IN: 42.5 mL / OUT: 100 mL / NET: -57.5 mL        MICROBIOLOGY:      MEDICATIONS  (STANDING):  chlorhexidine 4% Liquid 1 Application(s) Topical <User Schedule>  levETIRAcetam  IVPB 500 milliGRAM(s) IV Intermittent every 12 hours  niCARdipine Infusion 5 mG/Hr (25 mL/Hr) IV Continuous <Continuous>  pantoprazole  Injectable 40 milliGRAM(s) IV Push daily  sodium chloride 3%. 500 milliLiter(s) (30 mL/Hr) IV Continuous <Continuous>    MEDICATIONS  (PRN):      RADIOLOGY & ADDITIONAL STUDIES:

## 2021-05-06 NOTE — PROGRESS NOTE ADULT - ASSESSMENT
Impression      Acute ischemic stroke sp tpa still aphasic repeat head CT bleed bifrontal/ shift 0.4 sp hypertonic    - taper nicardipine  - Goal  to 150  - TTE  - Lipid panel and A1C  -  F/UP EEG neg  - IVF serial CMP  - ngt  - DVT prophylaxis  - poor prognosis

## 2021-05-06 NOTE — CHART NOTE - NSCHARTNOTEFT_GEN_A_CORE
Called by ICU resident around 2100 for worsening neurological status of patient. Patient no longer able to hold RUE antigravity. STAT CT head revealed stable bleed. Sodium Chloride 3% 250 cc bolus ordered, Mannitol 25 g ordered. Patient hyponatremic (134) on previous BMP, placed on Q6H BMP and Q1H neuro checks. Central line to be placed to infuse 3% at higher rate, goal 140 - 150. D/w attending.

## 2021-05-06 NOTE — CHART NOTE - NSCHARTNOTEFT_GEN_A_CORE
Discussed goals of care with patient's granddaughter, Miriam Mcmanus. Miriam is a former ICU nurse, now outpatient NP, and states that she has been discussing pt's clinical status and goals of care with pt's three children last night and today. She feels DNR/DNI likely appropriate given pt's condition, but will discuss with extended family today and call back to inform as to any updates to code status. Patient remains full code currently. Discussed goals of care with patient's granddaughter, Miriam Mcmanus. Miriam is a former ICU nurse, now outpatient NP, and states that she has been discussing pt's clinical status and goals of care with pt's three children last night and today. She feels DNR/DNI likely appropriate given pt's condition and given that family believes pt would not want to have his life prolonged artificially if there was no/minimal chance of meaningful recovery, but will discuss with extended family today and call back to inform as to any updates to code status. Patient remains full code currently.

## 2021-05-06 NOTE — PROGRESS NOTE ADULT - ASSESSMENT
Impression      Acute ischemic stroke sp tpa still aphasic repeat head CT bleed bifrontal/ shift 0.4mm    - taper nicardipine  - Goal  to 150  - TTE neg for atrial septal defect  -  F/UP EEG neg  - 3% NS for IVF w/ serial CMP  - ngt  - DVT prophylaxis - SCDs  - poor prognosis

## 2021-05-06 NOTE — PROGRESS NOTE ADULT - SUBJECTIVE AND OBJECTIVE BOX
OVERNIGHT EVENTS: events noted, on hypertonic, cardene 2.5, repeat head CT reviewed    Vital Signs Last 24 Hrs  T(C): 36.5 (06 May 2021 08:00), Max: 37 (05 May 2021 12:00)  T(F): 97.7 (06 May 2021 08:00), Max: 98.6 (05 May 2021 12:00)  HR: 92 (06 May 2021 07:30) (70 - 128)  BP: 155/55 (06 May 2021 07:30) (116/54 - 155/55)  BP(mean): 82 (06 May 2021 07:30) (76 - 113)  RR: 33 (06 May 2021 07:30) (13 - 38)  SpO2: 100% (06 May 2021 07:30) (100% - 100%)    PHYSICAL EXAMINATION:    GENERAL: ill looking, aphasic    HEENT: Head is normocephalic and atraumatic.     NECK: Supple.    LUNGS: dec bs both bases    HEART: Regular rate and rhythm without murmur.    ABDOMEN: Soft, nontender, and nondistended.      EXTREMITIES: Without any cyanosis, clubbing, rash, lesions or edema.    NEUROLOGIC: aphasic, r-sided hemiplegia, somnolent but arousable    SKIN: No ulceration or induration present.      LABS:                        11.6   7.62  )-----------( 176      ( 06 May 2021 04:50 )             36.4     05-06    142  |  111<H>  |  19  ----------------------------<  92  3.9   |  22  |  0.7    Ca    8.7      06 May 2021 04:50  Phos  1.7     05-06  Mg     2.1     05-06    TPro  6.5  /  Alb  4.0  /  TBili  0.6  /  DBili  x   /  AST  24  /  ALT  9   /  AlkPhos  80  05-05 05-05-21 @ 07:01  -  05-06-21 @ 07:00  --------------------------------------------------------  IN: 980 mL / OUT: 2055 mL / NET: -1075 mL    05-06-21 @ 07:01  -  05-06-21 @ 08:18  --------------------------------------------------------  IN: 42.5 mL / OUT: 100 mL / NET: -57.5 mL        MICROBIOLOGY:      MEDICATIONS  (STANDING):  chlorhexidine 4% Liquid 1 Application(s) Topical <User Schedule>  levETIRAcetam  IVPB 500 milliGRAM(s) IV Intermittent every 12 hours  niCARdipine Infusion 5 mG/Hr (25 mL/Hr) IV Continuous <Continuous>  pantoprazole  Injectable 40 milliGRAM(s) IV Push daily  sodium chloride 3%. 500 milliLiter(s) (30 mL/Hr) IV Continuous <Continuous>    MEDICATIONS  (PRN):      RADIOLOGY & ADDITIONAL STUDIES:

## 2021-05-07 LAB
ANION GAP SERPL CALC-SCNC: 12 MMOL/L — SIGNIFICANT CHANGE UP (ref 7–14)
ANION GAP SERPL CALC-SCNC: 13 MMOL/L — SIGNIFICANT CHANGE UP (ref 7–14)
ANION GAP SERPL CALC-SCNC: 15 MMOL/L — HIGH (ref 7–14)
BASOPHILS # BLD AUTO: 0.04 K/UL — SIGNIFICANT CHANGE UP (ref 0–0.2)
BASOPHILS NFR BLD AUTO: 0.4 % — SIGNIFICANT CHANGE UP (ref 0–1)
BUN SERPL-MCNC: 22 MG/DL — HIGH (ref 10–20)
BUN SERPL-MCNC: 22 MG/DL — HIGH (ref 10–20)
BUN SERPL-MCNC: 23 MG/DL — HIGH (ref 10–20)
CALCIUM SERPL-MCNC: 8.8 MG/DL — SIGNIFICANT CHANGE UP (ref 8.5–10.1)
CALCIUM SERPL-MCNC: 9.3 MG/DL — SIGNIFICANT CHANGE UP (ref 8.5–10.1)
CALCIUM SERPL-MCNC: 9.6 MG/DL — SIGNIFICANT CHANGE UP (ref 8.5–10.1)
CHLORIDE SERPL-SCNC: 115 MMOL/L — HIGH (ref 98–110)
CHLORIDE SERPL-SCNC: 117 MMOL/L — HIGH (ref 98–110)
CHLORIDE SERPL-SCNC: 117 MMOL/L — HIGH (ref 98–110)
CO2 SERPL-SCNC: 18 MMOL/L — SIGNIFICANT CHANGE UP (ref 17–32)
CO2 SERPL-SCNC: 21 MMOL/L — SIGNIFICANT CHANGE UP (ref 17–32)
CO2 SERPL-SCNC: 22 MMOL/L — SIGNIFICANT CHANGE UP (ref 17–32)
CREAT SERPL-MCNC: 0.7 MG/DL — SIGNIFICANT CHANGE UP (ref 0.7–1.5)
EOSINOPHIL # BLD AUTO: 0.06 K/UL — SIGNIFICANT CHANGE UP (ref 0–0.7)
EOSINOPHIL NFR BLD AUTO: 0.5 % — SIGNIFICANT CHANGE UP (ref 0–8)
GLUCOSE SERPL-MCNC: 103 MG/DL — HIGH (ref 70–99)
GLUCOSE SERPL-MCNC: 132 MG/DL — HIGH (ref 70–99)
GLUCOSE SERPL-MCNC: 97 MG/DL — SIGNIFICANT CHANGE UP (ref 70–99)
HCT VFR BLD CALC: 35.9 % — LOW (ref 42–52)
HGB BLD-MCNC: 11.5 G/DL — LOW (ref 14–18)
IMM GRANULOCYTES NFR BLD AUTO: 0.6 % — HIGH (ref 0.1–0.3)
LYMPHOCYTES # BLD AUTO: 1.09 K/UL — LOW (ref 1.2–3.4)
LYMPHOCYTES # BLD AUTO: 9.6 % — LOW (ref 20.5–51.1)
MCHC RBC-ENTMCNC: 25.1 PG — LOW (ref 27–31)
MCHC RBC-ENTMCNC: 32 G/DL — SIGNIFICANT CHANGE UP (ref 32–37)
MCV RBC AUTO: 78.4 FL — LOW (ref 80–94)
MONOCYTES # BLD AUTO: 1.05 K/UL — HIGH (ref 0.1–0.6)
MONOCYTES NFR BLD AUTO: 9.2 % — SIGNIFICANT CHANGE UP (ref 1.7–9.3)
MRSA PCR RESULT.: NEGATIVE — SIGNIFICANT CHANGE UP
NEUTROPHILS # BLD AUTO: 9.1 K/UL — HIGH (ref 1.4–6.5)
NEUTROPHILS NFR BLD AUTO: 79.7 % — HIGH (ref 42.2–75.2)
NRBC # BLD: 0 /100 WBCS — SIGNIFICANT CHANGE UP (ref 0–0)
PLATELET # BLD AUTO: 178 K/UL — SIGNIFICANT CHANGE UP (ref 130–400)
POTASSIUM SERPL-MCNC: 3.5 MMOL/L — SIGNIFICANT CHANGE UP (ref 3.5–5)
POTASSIUM SERPL-MCNC: 3.6 MMOL/L — SIGNIFICANT CHANGE UP (ref 3.5–5)
POTASSIUM SERPL-MCNC: 4.6 MMOL/L — SIGNIFICANT CHANGE UP (ref 3.5–5)
POTASSIUM SERPL-SCNC: 3.5 MMOL/L — SIGNIFICANT CHANGE UP (ref 3.5–5)
POTASSIUM SERPL-SCNC: 3.6 MMOL/L — SIGNIFICANT CHANGE UP (ref 3.5–5)
POTASSIUM SERPL-SCNC: 4.6 MMOL/L — SIGNIFICANT CHANGE UP (ref 3.5–5)
RBC # BLD: 4.58 M/UL — LOW (ref 4.7–6.1)
RBC # FLD: 15.2 % — HIGH (ref 11.5–14.5)
SODIUM SERPL-SCNC: 148 MMOL/L — HIGH (ref 135–146)
SODIUM SERPL-SCNC: 151 MMOL/L — HIGH (ref 135–146)
SODIUM SERPL-SCNC: 151 MMOL/L — HIGH (ref 135–146)
WBC # BLD: 11.41 K/UL — HIGH (ref 4.8–10.8)
WBC # FLD AUTO: 11.41 K/UL — HIGH (ref 4.8–10.8)

## 2021-05-07 PROCEDURE — 95720 EEG PHY/QHP EA INCR W/VEEG: CPT

## 2021-05-07 PROCEDURE — 99232 SBSQ HOSP IP/OBS MODERATE 35: CPT

## 2021-05-07 RX ORDER — POTASSIUM PHOSPHATE, MONOBASIC POTASSIUM PHOSPHATE, DIBASIC 236; 224 MG/ML; MG/ML
15 INJECTION, SOLUTION INTRAVENOUS ONCE
Refills: 0 | Status: COMPLETED | OUTPATIENT
Start: 2021-05-07 | End: 2021-05-07

## 2021-05-07 RX ORDER — POTASSIUM CHLORIDE 20 MEQ
20 PACKET (EA) ORAL
Refills: 0 | Status: COMPLETED | OUTPATIENT
Start: 2021-05-07 | End: 2021-05-07

## 2021-05-07 RX ORDER — SODIUM CHLORIDE 5 G/100ML
500 INJECTION, SOLUTION INTRAVENOUS
Refills: 0 | Status: DISCONTINUED | OUTPATIENT
Start: 2021-05-07 | End: 2021-05-07

## 2021-05-07 RX ADMIN — CHLORHEXIDINE GLUCONATE 1 APPLICATION(S): 213 SOLUTION TOPICAL at 05:26

## 2021-05-07 RX ADMIN — PANTOPRAZOLE SODIUM 40 MILLIGRAM(S): 20 TABLET, DELAYED RELEASE ORAL at 12:38

## 2021-05-07 RX ADMIN — POTASSIUM PHOSPHATE, MONOBASIC POTASSIUM PHOSPHATE, DIBASIC 41.67 MILLIMOLE(S): 236; 224 INJECTION, SOLUTION INTRAVENOUS at 17:42

## 2021-05-07 RX ADMIN — Medication 50 MILLIEQUIVALENT(S): at 03:30

## 2021-05-07 RX ADMIN — AMLODIPINE BESYLATE 5 MILLIGRAM(S): 2.5 TABLET ORAL at 05:25

## 2021-05-07 RX ADMIN — Medication 50 MILLIEQUIVALENT(S): at 05:26

## 2021-05-07 RX ADMIN — LEVETIRACETAM 420 MILLIGRAM(S): 250 TABLET, FILM COATED ORAL at 17:42

## 2021-05-07 RX ADMIN — ENOXAPARIN SODIUM 40 MILLIGRAM(S): 100 INJECTION SUBCUTANEOUS at 05:28

## 2021-05-07 RX ADMIN — LEVETIRACETAM 420 MILLIGRAM(S): 250 TABLET, FILM COATED ORAL at 05:26

## 2021-05-07 RX ADMIN — NICARDIPINE HYDROCHLORIDE 25 MG/HR: 30 CAPSULE, EXTENDED RELEASE ORAL at 06:00

## 2021-05-07 NOTE — PROGRESS NOTE ADULT - SUBJECTIVE AND OBJECTIVE BOX
Patient is a 86y old  Male who presents with a chief complaint of Stroke (07 May 2021 13:49)  pt seen and evaluated   resting comfortably  on NGT feed  ICU Vital Signs Last 24 Hrs  T(C): 36.4 (07 May 2021 12:00), Max: 36.7 (07 May 2021 04:00)  T(F): 97.5 (07 May 2021 12:00), Max: 98 (07 May 2021 04:00)  HR: 114 (07 May 2021 15:00) (80 - 126)  BP: 155/77 (07 May 2021 15:00) (112/54 - 171/78)  BP(mean): 116 (07 May 2021 15:00) (64 - 116)  RR: 21 (07 May 2021 15:00) (14 - 41)  SpO2: 100% (07 May 2021 15:00) (98% - 100%)      Drug Dosing Weight  Height (cm): 157.7 (03 May 2021 20:30)  Weight (kg): 53 (03 May 2021 20:30)  BMI (kg/m2): 21.3 (03 May 2021 20:30)  BSA (m2): 1.52 (03 May 2021 20:30)  06 May 2021 07:01  -  07 May 2021 07:00  --------------------------------------------------------  IN:    IV PiggyBack: 400 mL    Jevity 1.2: 480 mL    NiCARdipine: 450 mL    sodium chloride 3%: 510 mL    sodium chloride 3%: 140 mL  Total IN: 1980 mL    OUT:    Indwelling Catheter - Urethral (mL): 2525 mL  Total OUT: 2525 mL    Total NET: -545 mL      07 May 2021 07:01  -  07 May 2021 15:06  --------------------------------------------------------  IN:    Jevity 1.2: 240 mL    NiCARdipine: 62.5 mL    sodium chloride 3%: 80 mL  Total IN: 382.5 mL    OUT:    Indwelling Catheter - Urethral (mL): 1200 mL  Total OUT: 1200 mL    Total NET: -817.5 mL     PHYSICAL EXAM:  Constitutional:  resting comfortably  NGT in place  Gastrointestinal:  soft n/d  MEDICATIONS  (STANDING):  amLODIPine   Tablet 5 milliGRAM(s) Oral daily  chlorhexidine 4% Liquid 1 Application(s) Topical <User Schedule>  enoxaparin Injectable 40 milliGRAM(s) SubCutaneous daily  levETIRAcetam  IVPB 500 milliGRAM(s) IV Intermittent every 12 hours  niCARdipine Infusion 5 mG/Hr (25 mL/Hr) IV Continuous <Continuous>  pantoprazole  Injectable 40 milliGRAM(s) IV Push daily      Diet, NPO with Tube Feed:   Tube Feeding Modality: Nasogastric  Jevity 1.2 Dimitri  Total Volume for 24 Hours (mL): 960  Bolus  Total Volume of Bolus (mL):  240  Tube Feed Frequency: Every 6 hours   Tube Feed Start Time: 18:00  Bolus Feed Rate (mL per Hour): 480   Bolus Feed Duration (in Hours): 0.5  No Carb Prosource (1pkg = 15gms Protein)     Qty per Day:  1 (05-05-21 @ 18:09)      LABS  05-07    151<H>  |  117<H>  |  22<H>  ----------------------------<  97  3.6   |  22  |  0.7    Ca    9.6      07 May 2021 11:00  Phos  1.7     05-06  Mg     2.1     05-06                            11.5   11.41 )-----------( 178      ( 07 May 2021 04:45 )             35.9      Patient is a 86y old  Male who presents with a  Stroke (07 May 2021 13:49)  pt seen and evaluated   resting comfortably  on NGT feedings  ICU Vital Signs Last 24 Hrs  T(C): 36.4 (07 May 2021 12:00), Max: 36.7 (07 May 2021 04:00)  T(F): 97.5 (07 May 2021 12:00), Max: 98 (07 May 2021 04:00)  HR: 114 (07 May 2021 15:00) (80 - 126)  BP: 155/77 (07 May 2021 15:00) (112/54 - 171/78)  BP(mean): 116 (07 May 2021 15:00) (64 - 116)  RR: 21 (07 May 2021 15:00) (14 - 41)  SpO2: 100% (07 May 2021 15:00) (98% - 100%)    Drug Dosing Weight  Height (cm): 157.7 (03 May 2021 20:30)  Weight (kg): 53 (03 May 2021 20:30)  BMI (kg/m2): 21.3 (03 May 2021 20:30)  BSA (m2): 1.52 (03 May 2021 20:30)  06 May 2021 07:01  -  07 May 2021 07:00  --------------------------------------------------------  IN:    IV PiggyBack: 400 mL    Jevity 1.2: 480 mL    NiCARdipine: 450 mL    sodium chloride 3%: 510 mL    sodium chloride 3%: 140 mL  Total IN: 1980 mL    OUT:    Indwelling Catheter - Urethral (mL): 2525 mL  Total OUT: 2525 mL    Total NET: -545 mL      07 May 2021 07:01  -  07 May 2021 15:06  --------------------------------------------------------  IN:    Jevity 1.2: 240 mL    NiCARdipine: 62.5 mL    sodium chloride 3%: 80 mL  Total IN: 382.5 mL    OUT:    Indwelling Catheter - Urethral (mL): 1200 mL  Total OUT: 1200 mL    Total NET: -817.5 mL     PHYSICAL EXAM:  Constitutional: afebrile  NGT in place  Gastrointestinal: soft n/d    MEDICATIONS  (STANDING):  amLODIPine   Tablet 5 milliGRAM(s) Oral daily  chlorhexidine 4% Liquid 1 Application(s) Topical <User Schedule>  enoxaparin Injectable 40 milliGRAM(s) SubCutaneous daily  levETIRAcetam  IVPB 500 milliGRAM(s) IV Intermittent every 12 hours  niCARdipine Infusion 5 mG/Hr (25 mL/Hr) IV Continuous <Continuous>  pantoprazole  Injectable 40 milliGRAM(s) IV Push daily    Diet, NPO with Tube Feed:   Tube Feeding Modality: Nasogastric  Jevity 1.2 Dimitri  Total Volume for 24 Hours (mL): 960  Bolus  Total Volume of Bolus (mL):  240  Tube Feed Frequency: Every 6 hours   Tube Feed Start Time: 18:00  Bolus Feed Rate (mL per Hour): 480   Bolus Feed Duration (in Hours): 0.5  No Carb Prosource (1pkg = 15gms Protein)     Qty per Day:  1 (05-05-21 @ 18:09)    LABS  05-07    151<H>  |  117<H>  |  22<H>  ----------------------------<  97  3.6   |  22  |  0.7    Ca    9.6      07 May 2021 11:00  Phos  1.7     05-06  Mg     2.1     05-06                            11.5   11.41 )-----------( 178      ( 07 May 2021 04:45 )             35.9

## 2021-05-07 NOTE — EEG REPORT - NS EEG TEXT BOX
Epilepsy Attending Note:     YANETH MERCADO    86y Male  MRN [id number]    Vital Signs Last 24 Hrs  T(C): 36.3 (07 May 2021 08:00), Max: 36.7 (07 May 2021 04:00)  T(F): 97.4 (07 May 2021 08:00), Max: 98 (07 May 2021 04:00)  HR: 90 (07 May 2021 10:00) (76 - 126)  BP: 150/65 (07 May 2021 10:00) (112/54 - 179/65)  BP(mean): 98 (07 May 2021 10:00) (64 - 113)  RR: 19 (07 May 2021 10:00) (14 - 41)  SpO2: 98% (07 May 2021 10:00) (98% - 100%)                          11.5   11.41 )-----------( 178      ( 07 May 2021 04:45 )             35.9       05-07    148<H>  |  117<H>  |  23<H>  ----------------------------<  103<H>  4.6   |  18  |  0.7    Ca    8.8      07 May 2021 04:45  Phos  1.7     05-06  Mg     2.1     05-06        MEDICATIONS  (STANDING):  amLODIPine   Tablet 5 milliGRAM(s) Oral daily  chlorhexidine 4% Liquid 1 Application(s) Topical <User Schedule>  enoxaparin Injectable 40 milliGRAM(s) SubCutaneous daily  levETIRAcetam  IVPB 500 milliGRAM(s) IV Intermittent every 12 hours  niCARdipine Infusion 5 mG/Hr (25 mL/Hr) IV Continuous <Continuous>  pantoprazole  Injectable 40 milliGRAM(s) IV Push daily  sodium chloride 3%. 500 milliLiter(s) (20 mL/Hr) IV Continuous <Continuous>    MEDICATIONS  (PRN):            VEEG in the last 24 hours:    Background-----------------low amplitude ,continues reaching frequencies in the range of 5-6 hz  that shows reactivity     Focal and generalized slowing----------------   mild to moderate generalized   slowing. Bilateral independent anterior quadrants focal slowing    Interictal activity------------------ none    Events--------------------- none    Seizures---------------  none    Impression:  abnormal as above    Plan - --- results were discussed with the [facility] team

## 2021-05-07 NOTE — PROGRESS NOTE ADULT - SUBJECTIVE AND OBJECTIVE BOX
OVERNIGHT EVENTS: events noted, off cardene, on hypertonic 20 cc    Vital Signs Last 24 Hrs  T(C): 36.3 (07 May 2021 08:00), Max: 36.7 (07 May 2021 04:00)  T(F): 97.4 (07 May 2021 08:00), Max: 98 (07 May 2021 04:00)  HR: 98 (07 May 2021 08:00) (76 - 126)  BP: 151/67 (07 May 2021 08:00) (112/54 - 179/65)  BP(mean): 91 (07 May 2021 08:00) (64 - 111)  RR: 18 (07 May 2021 08:00) (14 - 41)  SpO2: 99% (07 May 2021 08:00) (98% - 100%)    PHYSICAL EXAMINATION:    GENERAL: ill looking    HEENT: Head is normocephalic and atraumatic.     NECK: Supple.    LUNGS: dec bs both bases    HEART: Regular rate and rhythm without murmur.    ABDOMEN: Soft, nontender, and nondistended.      EXTREMITIES: Without any cyanosis, clubbing, rash, lesions or edema.    NEUROLOGIC: improved r sided weakness    SKIN: No ulceration or induration present.      LABS:                        11.5   11.41 )-----------( 178      ( 07 May 2021 04:45 )             35.9     05-07    148<H>  |  117<H>  |  23<H>  ----------------------------<  103<H>  4.6   |  18  |  0.7    Ca    8.8      07 May 2021 04:45  Phos  1.7     05-06  Mg     2.1     05-06 05-06-21 @ 07:01  -  05-07-21 @ 07:00  --------------------------------------------------------  IN: 1980 mL / OUT: 2525 mL / NET: -545 mL    05-07-21 @ 07:01  -  05-07-21 @ 08:21  --------------------------------------------------------  IN: 20 mL / OUT: 125 mL / NET: -105 mL        MICROBIOLOGY:      MEDICATIONS  (STANDING):  amLODIPine   Tablet 5 milliGRAM(s) Oral daily  chlorhexidine 4% Liquid 1 Application(s) Topical <User Schedule>  enoxaparin Injectable 40 milliGRAM(s) SubCutaneous daily  levETIRAcetam  IVPB 500 milliGRAM(s) IV Intermittent every 12 hours  niCARdipine Infusion 5 mG/Hr (25 mL/Hr) IV Continuous <Continuous>  pantoprazole  Injectable 40 milliGRAM(s) IV Push daily  sodium chloride 3%. 500 milliLiter(s) (20 mL/Hr) IV Continuous <Continuous>    MEDICATIONS  (PRN):      RADIOLOGY & ADDITIONAL STUDIES:

## 2021-05-07 NOTE — PROGRESS NOTE ADULT - ASSESSMENT
Impression      Acute ischemic stroke sp tpa still aphasic repeat head CT bleed bifrontal/ shift 0.4 sp hypertonic      - Goal  to 150  - TTE  - Lipid panel and A1C  -  F/UP EEG RADHIKA  - IVF  F/UP CMP  - ngt  - DVT prophylaxis  - poor prognosis

## 2021-05-07 NOTE — SWALLOW BEDSIDE ASSESSMENT ADULT - SLP PERTINENT HISTORY OF CURRENT PROBLEM
86 year old right handed gentleman with no significant PMH presented to the ED with his granddaughter. Patient last known well at 11:30 AM. Patient found to be aphasic with right sided weakness at around noon. Stroke code and code NI called on arrival. Patient with NIHSS 18; MRI-> New large bifrontal lobar hemorrhage with hematocrit level, right larger than left, since the prior CT from 5/3/2021. Additional smaller focus of hemorrhage in the right occipital lobe. Associated moderate surrounding edema and mass effect, with leftward midline shift measuring 0.4 cm. Small foci of acute infarct in the left temporal and right parietal lobes. pt still full code.

## 2021-05-07 NOTE — PROGRESS NOTE ADULT - SUBJECTIVE AND OBJECTIVE BOX
Neurocritical Care Progress Note    YANETH MERCADO    86y     [id number]   Daily Weight in k (07 May 2021 02:30)  COVID-19 PCR: NotDetec (03 May 2021 14:58)    Patient is a 86y old  Male who presents with a chief complaint of Stroke (07 May 2021 08:20)    HPI:  86 year old right handed gentleman with no significant PMH presented to the ED with his granddaughter. Patient last known well at 11:30 AM. Patient found to be aphasic with right sided weakness at around noon. Stroke code and code NI called on arrival. Patient with NIHSS 18. CTH without intracranial pathology. Patient given IV TPA at 13:23. He was hypertensive on presentation () and was given Labetalol 20 mg IVP and started on nicardipine drip (now off) called for MICU  (03 May 2021 16:17)    Allergies: No Known Allergies    PAST MEDICAL & SURGICAL HISTORY:  Gout  not on treatment, no recent flares    No significant past surgical history      Home Medications:    24 Hour Events:  Patient sitting up in bed, awake and alert. Remains globally aphasic. On vEEG overnight. No subclinical seizures as per Dr. PARMAR Regulating normotension without nicardipine gtt. 3% overnight, but OK to stop for Na+ 148 today. No need for salt tabs at this time. Continue to monitor sodium levels.     Exam:  Neurologic Exam:  Mental status: Awake, alert. Remains globally aphasic. Not following commands.   Language: BRITTA  Cranial nerves: Pupils equally round and reactive to light. BRITTA visual fields. Extraocular muscles intact, V1 through V3 intact bilaterally and symmetric. Face symmetric at rest. BRITTA hearing  Motor:  Normal bulk and tone. Strength:    1/5 in RUE  3/5 in LUE  1/5 in RLE  3/5 in LLE   strength BRITTA  Motor: No tremors or tics noted. Moves left UE/LE spontaneously  Sensation: Intact to noxious stimuli x4 extremities. No neglect noted  Coordination: BRITTA    Current Medications:  amLODIPine   Tablet 5 milliGRAM(s) Oral daily  chlorhexidine 4% Liquid 1 Application(s) Topical <User Schedule>  enoxaparin Injectable 40 milliGRAM(s) SubCutaneous daily  levETIRAcetam  IVPB 500 milliGRAM(s) IV Intermittent every 12 hours  niCARdipine Infusion 5 mG/Hr IV Continuous <Continuous>  pantoprazole  Injectable 40 milliGRAM(s) IV Push daily    Vital Signs Last 24 Hrs  T(C): 36.4 (07 May 2021 12:00), Max: 36.7 (07 May 2021 04:00)  T(F): 97.5 (07 May 2021 12:00), Max: 98 (07 May 2021 04:00)  HR: 112 (07 May 2021 13:00) (78 - 126)  BP: 162/75 (07 May 2021 13:00) (112/54 - 167/70)  BP(mean): 100 (07 May 2021 13:00) (64 - 113)  RR: 27 (07 May 2021 13:00) (14 - 41)  SpO2: 100% (07 May 2021 13:00) (98% - 100%)     I/Os:  I&O's Detail    06 May 2021 07:01  -  07 May 2021 07:00  --------------------------------------------------------  IN:    IV PiggyBack: 400 mL    Jevity 1.2: 480 mL    NiCARdipine: 450 mL    sodium chloride 3%: 510 mL    sodium chloride 3%: 140 mL  Total IN: 1980 mL    OUT:    Indwelling Catheter - Urethral (mL): 2525 mL  Total OUT: 2525 mL    Total NET: -545 mL      07 May 2021 07:01  -  07 May 2021 13:30  --------------------------------------------------------  IN:    Jevity 1.2: 240 mL    NiCARdipine: 25 mL    sodium chloride 3%: 80 mL  Total IN: 345 mL    OUT:    Indwelling Catheter - Urethral (mL): 875 mL  Total OUT: 875 mL    Total NET: -530 mL                        11.5   11.41 )-----------( 178      ( 07 May 2021 04:45 )             35.9      05-07    151<H>  |  117<H>  |  22<H>  ----------------------------<  97  3.6   |  22  |  0.7    Ca    9.6      07 May 2021 11:00  Phos  1.7     -  Mg     2.1         Diagnostics/ Results:  Last CTH: < from: CT Head No Cont (21 @ 20:56) >  IMPRESSION:      Since 2021 12:10 PM:    Again noted multiple stable bilateral lobar hematomas, with associated stable mass effect from the right frontal hematoma with associated 0.4 cmmidline shift to the left.    Stable examination.    < end of copied text >      Last CTA/MRA: < from: CT Angio Neck w/ IV Cont (21 @ 14:10) >  IMPRESSION:    1.  No evidence of acute large vessel occlusion. Normal perfusion images.    2.  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    3.  Moderate (about 60% stenosis) of the right ICA origin.    4.  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    < end of copied text >    Last CTP: < from: CT Angio Neck w/ IV Cont (21 @ 14:10) >  IMPRESSION:    1.  No evidence of acute large vessel occlusion. Normal perfusion images.    2.  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    3.  Moderate (about 60% stenosis) of the right ICA origin.    4.  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    < end of copied text >    Last MRI: < from: MR Head w/wo IV Cont (21 @ 14:34) >  IMPRESSION:    New large bifrontal lobar hemorrhage with hematocrit level, right larger than left, since the prior CT from 5/3/2021. Additional smaller focus of hemorrhage in the right occipital lobe. Also noted are multiple scattered punctate susceptibility signals in the bilateral cerebral hemispheres. These constellations of findings are suspicious for lobar hemorrhage associated with amyloid angiopathy. Associated moderate surrounding edema and mass effect, with leftward midline shift measuring 0.4 cm.    Small foci of acute infarct in the left temporal and right parietal lobes.    Linear signal abnormality and enhancement along the right superior cerebral sulcus and precentral sulci could reflect subarachnoid hemorrhage.    < end of copied text >    Last TCD: n/a    Last EEG: VEEG in the last 24 hours: 21    Background-----------------low amplitude ,continues reaching frequencies in the range of 5-6 hz  that shows reactivity     Focal and generalized slowing----------------   mild to moderate generalized   slowing. Bilateral independent anterior quadrants focal slowing    Interictal activity------------------ none    Events--------------------- none    Seizures---------------  none    Impression:  abnormal as above    Last Echo: < from: TTE Echo Complete w/o Contrast w/ Doppler (21 @ 10:16) >  Summary:   1. Normal global left ventricular systolic function.   2. LV Ejection Fraction by Solomon's Method with a biplane EF of 72 %.   3. Mild left ventricular hypertrophy.   4. Mildly increased LV wall thickness.   5. Normal left ventricular internal cavity size.   6. Normal left atrial size.   7. Normal right atrial size.   8. There is no evidence of pericardial effusion.   9. Mild mitral annular calcification.  10. Mild mitral valve regurgitation.  11. Mild thickening of the anterior and posterior mitral valve leaflets.  12. Mild tricuspid regurgitation.  13. Mild to moderate aortic regurgitation.  14. Color flow doppler and intravenous injection of agitated saline demonstrates the presence of an intact intra atrial septum.    < end of copied text >    Last EKG: < from: 12 Lead ECG (21 @ 14:15) >  Diagnosis Line Normal sinus rhythm  Normal ECG    < end of copied text >    Chest Xray: < from: Xray Chest 1 View-PORTABLE IMMEDIATE (Xray Chest 1 View-PORTABLE IMMEDIATE .) (21 @ 00:44) >  Impression:    No radiographic evidence of acute cardiopulmonary disease.    Support devices as described.    < end of copied text >    ROS:  [X] A ten-point ROS was otherwise negative except as noted in HPI    Assessment:  87 y/o M w/ PMH gout and family history of stroke p/w acute onset aphasia, L gaze preference, R HP, and was in hypertensive emergency. Stroke code called; initial NIHSS 18. Initial CTH w/o pathology. Pt w/in window for tPA (labetalol given for HTN). MRI and repeat CT consistent with bifrontal bilateral hemorrhages (without clinical worsening), which are likely 2/2 to hemorrhagic conversion of infarction after administration of TPA. No NSx intervention at this time, as evacuation will not likely improve the patient's functional neurological prognosis. Stability scan shows stable bleed. vEEG overnight negative for subclinical seizures. Currently regulating normotension without nicardipine gtt. 3% overnight, but OK to stop for Na+ 151 today. No need for salt tabs at this time. Patient sitting up in bed, awake and alert. Remains globally aphasic. Not following commands.       SBP in the lower range, so nicardipine gtt held w/ bolus given. Will continue to monitor. On exam, pt’s eyes remained closed and globally aphasic, did not follow commands (even in his native language via ), but Margarita.     Plan:  Neurology:  -Neuro checks Q1; (hemorrhagic transformation post tPA). STAT CTH for acute change in neurological status  -NSx consult; no surgical intervention at this time, as evacuation will likely not improve the patient's functional neurological prognosis  -C/w Keppra 500 BID; seizure ppx  -Avoid AC/AP; bleeds likely secondary to hemorrhagic conversion of infarction after administration of TPA. DVT ppx OK    Respiratory:  -Keep HOB > 35; aspiration precautions     Cardiology:  -Keep SBP goal 120-150; titrate nicardipine gtt PRN (currently off). Try to control HTN with PO meds (Norvasc) if able    GI/Nutrition:  -Diet, NPO with Tube Feed: Tube Feeding Modality: Nasogastric Jevity 1.2 Dimitri  -Bowel Regimen ->n/a  -PPx -> Pantoprazole     / Renal/ Fluids/ Electrolytes:  -Mild hyponatremic; resolving. OK to stop 3% and assess sodium status. Salt tabs not needed at this time  -Monitor and treat lytes; Na+ last 151  -Keep euvolemic; no need for IVF at this time since patient is on TF    Hematology/ Oncology:  -DVT PPx -> Heparin SQ    Lovenox SQ    Heparin gtt     SCDs    Infectious Disease:  -Monitor and treat fevers; tylenol PRN    Musculoskeletal  -Bedrest    Endocrine:  -Lipitor when able for PO    Tubes/ Lines/ Drains:  Central    Peripheral    A-line    Olivares    NGT/ OGT    Chest    EVD    Disposition:  Continue medical management as per primary team in:   ICU   CCU   SICU   Stroke Unit   Stepdown    CODE STATUS w/ Next of Kin:   DNI   DNR   FULL    Patient seen and case discussed with NCC attending; see attending attestation  Please call w/ questions  Lorie Nam NP  [phone]                       Neurocritical Care Progress Note    YANETH MERCADO    86y     [id number]   Daily Weight in k (07 May 2021 02:30)  COVID-19 PCR: NotDetec (03 May 2021 14:58)    Patient is a 86y old  Male who presents with a chief complaint of Stroke (07 May 2021 08:20)    HPI:  86 year old right handed gentleman with no significant PMH presented to the ED with his granddaughter. Patient last known well at 11:30 AM. Patient found to be aphasic with right sided weakness at around noon. Stroke code and code NI called on arrival. Patient with NIHSS 18. CTH without intracranial pathology. Patient given IV TPA at 13:23. He was hypertensive on presentation () and was given Labetalol 20 mg IVP and started on nicardipine drip (now off) called for MICU  (03 May 2021 16:17)    Allergies: No Known Allergies    PAST MEDICAL & SURGICAL HISTORY:  Gout  not on treatment, no recent flares    No significant past surgical history    24 Hour Events:  Patient sitting up in bed, awake and alert. Remains globally aphasic. On vEEG overnight. No subclinical seizures as per Dr. PARMAR Regulating normotension without nicardipine gtt. 3% overnight, but OK to stop for Na+ 148 today. No need for salt tabs at this time. Continue to monitor sodium levels.     Exam:  Neurologic Exam:  Mental status: Awake, alert. Improved neuro exam from yesterday. Remains globally aphasic. Not following commands.   Language: BRITTA  Cranial nerves: Pupils equally round and reactive to light. Visual fields intact. Extraocular muscles intact, V1 through V3 intact bilaterally and symmetric. Face symmetric at rest. BRITTA hearing  Motor:  Normal bulk and tone. Strength:    1/5 in RUE  3/5 in LUE  1/5 in RLE  3/5 in LLE   strength BRITTA  Motor: No tremors or tics noted. Moves left UE/LE spontaneously  Sensation: Intact to noxious stimuli x4 extremities. No neglect noted  Coordination: BRITTA    Current Medications:  amLODIPine   Tablet 5 milliGRAM(s) Oral daily  chlorhexidine 4% Liquid 1 Application(s) Topical <User Schedule>  enoxaparin Injectable 40 milliGRAM(s) SubCutaneous daily  levETIRAcetam  IVPB 500 milliGRAM(s) IV Intermittent every 12 hours  niCARdipine Infusion 5 mG/Hr IV Continuous <Continuous>  pantoprazole  Injectable 40 milliGRAM(s) IV Push daily    Vital Signs Last 24 Hrs  T(C): 36.4 (07 May 2021 12:00), Max: 36.7 (07 May 2021 04:00)  T(F): 97.5 (07 May 2021 12:00), Max: 98 (07 May 2021 04:00)  HR: 112 (07 May 2021 13:00) (78 - 126)  BP: 162/75 (07 May 2021 13:00) (112/54 - 167/70)  BP(mean): 100 (07 May 2021 13:00) (64 - 113)  RR: 27 (07 May 2021 13:00) (14 - 41)  SpO2: 100% (07 May 2021 13:00) (98% - 100%)     I/Os:  I&O's Detail    06 May 2021 07:01  -  07 May 2021 07:00  --------------------------------------------------------  IN:    IV PiggyBack: 400 mL    Jevity 1.2: 480 mL    NiCARdipine: 450 mL    sodium chloride 3%: 510 mL    sodium chloride 3%: 140 mL  Total IN: 1980 mL    OUT:    Indwelling Catheter - Urethral (mL): 2525 mL  Total OUT: 2525 mL    Total NET: -545 mL      07 May 2021 07:01  -  07 May 2021 13:30  --------------------------------------------------------  IN:    Jevity 1.2: 240 mL    NiCARdipine: 25 mL    sodium chloride 3%: 80 mL  Total IN: 345 mL    OUT:    Indwelling Catheter - Urethral (mL): 875 mL  Total OUT: 875 mL    Total NET: -530 mL                        11.5   11.41 )-----------( 178      ( 07 May 2021 04:45 )             35.9      05-07    151<H>  |  117<H>  |  22<H>  ----------------------------<  97  3.6   |  22  |  0.7    Ca    9.6      07 May 2021 11:00  Phos  1.7     05-  Mg     2.1     -    Diagnostics/ Results:  Last CTH: < from: CT Head No Cont (21 @ 20:56) >  IMPRESSION:      Since 2021 12:10 PM:    Again noted multiple stable bilateral lobar hematomas, with associated stable mass effect from the right frontal hematoma with associated 0.4 cmmidline shift to the left.    Stable examination.    < end of copied text >      Last CTA/MRA: < from: CT Angio Neck w/ IV Cont (21 @ 14:10) >  IMPRESSION:    1.  No evidence of acute large vessel occlusion. Normal perfusion images.    2.  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    3.  Moderate (about 60% stenosis) of the right ICA origin.    4.  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    < end of copied text >    Last CTP: < from: CT Angio Neck w/ IV Cont (21 @ 14:10) >  IMPRESSION:    1.  No evidence of acute large vessel occlusion. Normal perfusion images.    2.  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    3.  Moderate (about 60% stenosis) of the right ICA origin.    4.  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    < end of copied text >    Last MRI: < from: MR Head w/wo IV Cont (21 @ 14:34) >  IMPRESSION:    New large bifrontal lobar hemorrhage with hematocrit level, right larger than left, since the prior CT from 5/3/2021. Additional smaller focus of hemorrhage in the right occipital lobe. Also noted are multiple scattered punctate susceptibility signals in the bilateral cerebral hemispheres. These constellations of findings are suspicious for lobar hemorrhage associated with amyloid angiopathy. Associated moderate surrounding edema and mass effect, with leftward midline shift measuring 0.4 cm.    Small foci of acute infarct in the left temporal and right parietal lobes.    Linear signal abnormality and enhancement along the right superior cerebral sulcus and precentral sulci could reflect subarachnoid hemorrhage.    < end of copied text >    Last TCD: n/a    Last EEG: VEEG in the last 24 hours: 21    Background-----------------low amplitude ,continues reaching frequencies in the range of 5-6 hz  that shows reactivity     Focal and generalized slowing----------------   mild to moderate generalized   slowing. Bilateral independent anterior quadrants focal slowing    Interictal activity------------------ none    Events--------------------- none    Seizures---------------  none    Impression:  abnormal as above    Last Echo: < from: TTE Echo Complete w/o Contrast w/ Doppler (21 @ 10:16) >  Summary:   1. Normal global left ventricular systolic function.   2. LV Ejection Fraction by Solomon's Method with a biplane EF of 72 %.   3. Mild left ventricular hypertrophy.   4. Mildly increased LV wall thickness.   5. Normal left ventricular internal cavity size.   6. Normal left atrial size.   7. Normal right atrial size.   8. There is no evidence of pericardial effusion.   9. Mild mitral annular calcification.  10. Mild mitral valve regurgitation.  11. Mild thickening of the anterior and posterior mitral valve leaflets.  12. Mild tricuspid regurgitation.  13. Mild to moderate aortic regurgitation.  14. Color flow doppler and intravenous injection of agitated saline demonstrates the presence of an intact intra atrial septum.    < end of copied text >    Last EKG: < from: 12 Lead ECG (21 @ 14:15) >  Diagnosis Line Normal sinus rhythm  Normal ECG    < end of copied text >    Chest Xray: < from: Xray Chest 1 View-PORTABLE IMMEDIATE (Xray Chest 1 View-PORTABLE IMMEDIATE .) (21 @ 00:44) >  Impression:    No radiographic evidence of acute cardiopulmonary disease.    Support devices as described.    < end of copied text >    ROS:  [X] A ten-point ROS was otherwise negative except as noted in HPI    Assessment:  87 y/o M w/ PMH gout and family history of stroke p/w acute onset aphasia, L gaze preference, R HP, and was in hypertensive emergency. Stroke code called; initial NIHSS 18. Initial CTH w/o pathology. Pt w/in window for tPA (labetalol given for HTN). MRI and repeat CT consistent with bifrontal bilateral hemorrhages (without clinical worsening), which are likely 2/2 to hemorrhagic conversion of infarction after administration of TPA. No NSx intervention at this time, as evacuation will not likely improve the patient's functional neurological prognosis. Stability scan shows stable bleed. vEEG overnight negative for subclinical seizures. Currently regulating normotension without nicardipine gtt. 3% overnight, but OK to stop for Na+ 151 today. No need for salt tabs at this time. Patient sitting up in bed, awake and alert. Neuro exam improved from yesterday, however, remains globally aphasic. Not following commands. Will continue to monitor.     Plan:  Neurology:  -Neuro checks Q1; (hemorrhagic transformation post tPA). STAT CTH for acute change in neurological status  -NSx consult; no surgical intervention at this time, as evacuation will likely not improve the patient's functional neurological prognosis  -C/w Keppra 500 BID; seizure ppx  -Avoid AC/AP; bleeds likely secondary to hemorrhagic conversion of infarction after administration of TPA. DVT ppx OK    Respiratory:  -Keep HOB > 35; aspiration precautions     Cardiology:  -Keep SBP goal 120-150; titrate nicardipine gtt PRN (currently off). Try to control HTN with PO meds (Norvasc) if able    GI/Nutrition:  -Diet, NPO with Tube Feed: Tube Feeding Modality: Nasogastric Jevity 1.2 Dimitri  -Bowel Regimen ->n/a  -PPx -> Pantoprazole     / Renal/ Fluids/ Electrolytes:  -Mild hyponatremic; resolving. OK to stop 3% and assess sodium status. Salt tabs not needed at this time  -Monitor and treat lytes; Na+ last 151  -Keep euvolemic; no need for IVF at this time since patient is on TF    Hematology/ Oncology:  -DVT PPx -> Heparin SQ    Lovenox SQ    Heparin gtt     SCDs    Infectious Disease:  -Monitor and treat fevers; tylenol PRN    Musculoskeletal  -Bedrest    Endocrine:  -Lipitor when able for PO    Tubes/ Lines/ Drains:  Central    Peripheral    A-line    Olivares    NGT/ OGT    Chest    EVD    Disposition:  Continue medical management as per primary team in:   ICU   CCU   SICU   Stroke Unit   Stepdown    CODE STATUS w/ Next of Kin:   DNI   DNR   FULL    Patient seen and case discussed with NCC attending; see attending attestation  Please call w/ questions  Lorie Nam NP  [phone]                       Neurocritical Care Progress Note    YANETH MERCADO    86y     [id number]   Daily Weight in k (07 May 2021 02:30)  COVID-19 PCR: NotDetec (03 May 2021 14:58)    Patient is a 86y old  Male who presents with a chief complaint of Stroke (07 May 2021 08:20)    HPI:  86 year old right handed gentleman with no significant PMH presented to the ED with his granddaughter. Patient last known well at 11:30 AM. Patient found to be aphasic with right sided weakness at around noon. Stroke code and code NI called on arrival. Patient with NIHSS 18. CTH without intracranial pathology. Patient given IV TPA at 13:23. He was hypertensive on presentation () and was given Labetalol 20 mg IVP and started on nicardipine drip (now off) called for MICU  (03 May 2021 16:17)    Allergies: No Known Allergies    PAST MEDICAL & SURGICAL HISTORY:  Gout  not on treatment, no recent flares    No significant past surgical history    24 Hour Events:  Patient sitting up in bed, awake and alert. Remains globally aphasic. On vEEG overnight. No subclinical seizures as per Dr. PARMAR Regulating normotension without nicardipine gtt. 3% overnight, but OK to stop for Na+ 148 today. No need for salt tabs at this time. Continue to monitor sodium levels.     Exam:  Neurologic Exam:  Mental status: Awake, alert. Improved neuro exam from yesterday. Remains globally aphasic. Not following commands.   Language: BRITTA  Cranial nerves: Pupils equally round and reactive to light. Visual fields intact. Extraocular muscles intact, V1 through V3 intact bilaterally and symmetric. Face symmetric at rest. BRITTA hearing  Motor:  Normal bulk and tone. Strength:    1/5 in RUE  3/5 in LUE  1/5 in RLE  3/5 in LLE   strength BRITTA  Motor: No tremors or tics noted. Moves left UE/LE spontaneously  Sensation: Intact to noxious stimuli x4 extremities. No neglect noted  Coordination: BRITTA    Current Medications:  amLODIPine   Tablet 5 milliGRAM(s) Oral daily  chlorhexidine 4% Liquid 1 Application(s) Topical <User Schedule>  enoxaparin Injectable 40 milliGRAM(s) SubCutaneous daily  levETIRAcetam  IVPB 500 milliGRAM(s) IV Intermittent every 12 hours  niCARdipine Infusion 5 mG/Hr IV Continuous <Continuous>  pantoprazole  Injectable 40 milliGRAM(s) IV Push daily    Vital Signs Last 24 Hrs  T(C): 36.4 (07 May 2021 12:00), Max: 36.7 (07 May 2021 04:00)  T(F): 97.5 (07 May 2021 12:00), Max: 98 (07 May 2021 04:00)  HR: 112 (07 May 2021 13:00) (78 - 126)  BP: 162/75 (07 May 2021 13:00) (112/54 - 167/70)  BP(mean): 100 (07 May 2021 13:00) (64 - 113)  RR: 27 (07 May 2021 13:00) (14 - 41)  SpO2: 100% (07 May 2021 13:00) (98% - 100%)     I/Os:  I&O's Detail    06 May 2021 07:01  -  07 May 2021 07:00  --------------------------------------------------------  IN:    IV PiggyBack: 400 mL    Jevity 1.2: 480 mL    NiCARdipine: 450 mL    sodium chloride 3%: 510 mL    sodium chloride 3%: 140 mL  Total IN: 1980 mL    OUT:    Indwelling Catheter - Urethral (mL): 2525 mL  Total OUT: 2525 mL    Total NET: -545 mL      07 May 2021 07:01  -  07 May 2021 13:30  --------------------------------------------------------  IN:    Jevity 1.2: 240 mL    NiCARdipine: 25 mL    sodium chloride 3%: 80 mL  Total IN: 345 mL    OUT:    Indwelling Catheter - Urethral (mL): 875 mL  Total OUT: 875 mL    Total NET: -530 mL                        11.5   11.41 )-----------( 178      ( 07 May 2021 04:45 )             35.9      05-07    151<H>  |  117<H>  |  22<H>  ----------------------------<  97  3.6   |  22  |  0.7    Ca    9.6      07 May 2021 11:00  Phos  1.7     05-  Mg     2.1     -    Diagnostics/ Results:  Last CTH: < from: CT Head No Cont (21 @ 20:56) >  IMPRESSION:      Since 2021 12:10 PM:    Again noted multiple stable bilateral lobar hematomas, with associated stable mass effect from the right frontal hematoma with associated 0.4 cmmidline shift to the left.    Stable examination.    < end of copied text >      Last CTA/MRA: < from: CT Angio Neck w/ IV Cont (21 @ 14:10) >  IMPRESSION:    1.  No evidence of acute large vessel occlusion. Normal perfusion images.    2.  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    3.  Moderate (about 60% stenosis) of the right ICA origin.    4.  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    < end of copied text >    Last CTP: < from: CT Angio Neck w/ IV Cont (21 @ 14:10) >  IMPRESSION:    1.  No evidence of acute large vessel occlusion. Normal perfusion images.    2.  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    3.  Moderate (about 60% stenosis) of the right ICA origin.    4.  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    < end of copied text >    Last MRI: < from: MR Head w/wo IV Cont (21 @ 14:34) >  IMPRESSION:    New large bifrontal lobar hemorrhage with hematocrit level, right larger than left, since the prior CT from 5/3/2021. Additional smaller focus of hemorrhage in the right occipital lobe. Also noted are multiple scattered punctate susceptibility signals in the bilateral cerebral hemispheres. These constellations of findings are suspicious for lobar hemorrhage associated with amyloid angiopathy. Associated moderate surrounding edema and mass effect, with leftward midline shift measuring 0.4 cm.    Small foci of acute infarct in the left temporal and right parietal lobes.    Linear signal abnormality and enhancement along the right superior cerebral sulcus and precentral sulci could reflect subarachnoid hemorrhage.    < end of copied text >    Last TCD: n/a    Last EEG: VEEG in the last 24 hours: 21    Background-----------------low amplitude ,continues reaching frequencies in the range of 5-6 hz  that shows reactivity     Focal and generalized slowing----------------   mild to moderate generalized   slowing. Bilateral independent anterior quadrants focal slowing    Interictal activity------------------ none    Events--------------------- none    Seizures---------------  none    Impression:  abnormal as above    Last Echo: < from: TTE Echo Complete w/o Contrast w/ Doppler (21 @ 10:16) >  Summary:   1. Normal global left ventricular systolic function.   2. LV Ejection Fraction by Solomon's Method with a biplane EF of 72 %.   3. Mild left ventricular hypertrophy.   4. Mildly increased LV wall thickness.   5. Normal left ventricular internal cavity size.   6. Normal left atrial size.   7. Normal right atrial size.   8. There is no evidence of pericardial effusion.   9. Mild mitral annular calcification.  10. Mild mitral valve regurgitation.  11. Mild thickening of the anterior and posterior mitral valve leaflets.  12. Mild tricuspid regurgitation.  13. Mild to moderate aortic regurgitation.  14. Color flow doppler and intravenous injection of agitated saline demonstrates the presence of an intact intra atrial septum.    < end of copied text >    Last EKG: < from: 12 Lead ECG (21 @ 14:15) >  Diagnosis Line Normal sinus rhythm  Normal ECG    < end of copied text >    Chest Xray: < from: Xray Chest 1 View-PORTABLE IMMEDIATE (Xray Chest 1 View-PORTABLE IMMEDIATE .) (21 @ 00:44) >  Impression:    No radiographic evidence of acute cardiopulmonary disease.    Support devices as described.    < end of copied text >    ROS:  [X] A ten-point ROS was otherwise negative except as noted in HPI    Assessment:  85 y/o M w/ PMH gout and family history of stroke p/w acute onset aphasia, L gaze preference, R HP, and was in hypertensive emergency. Stroke code called; initial NIHSS 18. Initial CTH w/o pathology. Pt w/in window for tPA (labetalol given for HTN). MRI and repeat CT consistent with bifrontal bilateral hemorrhages (without clinical worsening), which are likely 2/2 to hemorrhagic conversion of infarction after administration of TPA. No NSx intervention at this time, as evacuation will not likely improve the patient's functional neurological prognosis. Stability scan shows stable bleed. vEEG overnight negative for subclinical seizures. Currently regulating normotension without nicardipine gtt. 3% overnight, but OK to stop for Na+ 151 today. No need for salt tabs at this time. Patient sitting up in bed, awake and alert. Neuro exam improved from yesterday, however, remains globally aphasic. Not following commands. Will continue to monitor.     Plan:  Neurology:  -Neuro checks Q1; (hemorrhagic transformation post tPA). STAT CTH for acute change in neurological status  -NSx consult; no surgical intervention at this time, as evacuation will likely not improve the patient's functional neurological prognosis  -C/w Keppra 500 BID; seizure ppx  -Avoid AC/AP; bleeds likely secondary to hemorrhagic conversion of infarction after administration of TPA. DVT ppx OK  -OK to d/c vEEG (no subclinical seizure)    Respiratory:  -Keep HOB > 35; aspiration precautions     Cardiology:  -Keep SBP goal 120-150; titrate nicardipine gtt PRN (currently off). Try to control HTN with PO meds (Norvasc) if able    GI/Nutrition:  -Diet, NPO with Tube Feed: Tube Feeding Modality: Nasogastric Jevity 1.2 Dimitri  -Bowel Regimen ->n/a  -PPx -> Pantoprazole     / Renal/ Fluids/ Electrolytes:  -Mild hyponatremic; resolving. OK to stop 3% and assess sodium status. Salt tabs not needed at this time  -Monitor and treat lytes; Na+ last 151  -Keep euvolemic; no need for IVF at this time since patient is on TF    Hematology/ Oncology:  -DVT PPx -> Heparin SQ    Lovenox SQ    Heparin gtt     SCDs    Infectious Disease:  -Monitor and treat fevers; tylenol PRN    Musculoskeletal  -Bedrest    Endocrine:  -Lipitor when able for PO    Tubes/ Lines/ Drains:  Central    Peripheral    A-line    Olivares    NGT/ OGT    Chest    EVD    Disposition:  Continue medical management as per primary team in:   ICU   CCU   SICU   Stroke Unit   Stepdown    CODE STATUS w/ Next of Kin:   DNI   DNR   FULL    Patient seen and case discussed with NCC attending; see attending attestation  Please call w/ questions  Lorie Nam NP  [id number]

## 2021-05-07 NOTE — PROGRESS NOTE ADULT - ASSESSMENT
ASSESSMENT/PLAN  - hemorrhagic stroke  - hypernatremia  - altered mental status     - to be kept NPO   hypophosphatemia      -treat the phos  - estimated REE 1008 by PSE and 1093 by MSJ eqn  -- Jevity 1.2 240 ml to infuse over 30 minutes - order it q6h     - this --> 53 gm protein & 1140 kcal/d     - add Prosource TF one a day to increase protein to 64 gm/d  - obtain BMP, phos, Mg in am and expect pt to need phos ASSESSMENT/PLAN  - hemorrhagic stroke  - hypernatremia  - altered mental status     - to be kept NPO   hypophosphatemia      -treat the phos - d/w resident and 15 mM Kphos IVPB ordered - f/u level in am   - estimated REE 1008 by PSE and 1093 by MSJ eqn  -- Jevity 1.2 240 ml to infuse over 30 minutes - order it q6h     - this --> 53 gm protein & 1140 kcal/d     - add Prosource TF one a day to increase protein to 64 gm/d - document that the prosource is given, please  - obtain BMP, phos, Mg in am and expect pt to need phos

## 2021-05-08 LAB — GLUCOSE BLDC GLUCOMTR-MCNC: 139 MG/DL — HIGH (ref 70–99)

## 2021-05-08 PROCEDURE — 99291 CRITICAL CARE FIRST HOUR: CPT

## 2021-05-08 PROCEDURE — 99232 SBSQ HOSP IP/OBS MODERATE 35: CPT

## 2021-05-08 RX ORDER — AMLODIPINE BESYLATE 2.5 MG/1
10 TABLET ORAL DAILY
Refills: 0 | Status: DISCONTINUED | OUTPATIENT
Start: 2021-05-09 | End: 2021-05-12

## 2021-05-08 RX ORDER — PANTOPRAZOLE SODIUM 20 MG/1
40 TABLET, DELAYED RELEASE ORAL DAILY
Refills: 0 | Status: DISCONTINUED | OUTPATIENT
Start: 2021-05-09 | End: 2021-05-12

## 2021-05-08 RX ORDER — SODIUM CHLORIDE 9 MG/ML
500 INJECTION INTRAMUSCULAR; INTRAVENOUS; SUBCUTANEOUS ONCE
Refills: 0 | Status: COMPLETED | OUTPATIENT
Start: 2021-05-08 | End: 2021-05-08

## 2021-05-08 RX ORDER — METOPROLOL TARTRATE 50 MG
25 TABLET ORAL ONCE
Refills: 0 | Status: DISCONTINUED | OUTPATIENT
Start: 2021-05-08 | End: 2021-05-08

## 2021-05-08 RX ORDER — METOPROLOL TARTRATE 50 MG
25 TABLET ORAL
Refills: 0 | Status: DISCONTINUED | OUTPATIENT
Start: 2021-05-08 | End: 2021-05-12

## 2021-05-08 RX ORDER — AMLODIPINE BESYLATE 2.5 MG/1
5 TABLET ORAL ONCE
Refills: 0 | Status: COMPLETED | OUTPATIENT
Start: 2021-05-08 | End: 2021-05-08

## 2021-05-08 RX ADMIN — LEVETIRACETAM 420 MILLIGRAM(S): 250 TABLET, FILM COATED ORAL at 17:50

## 2021-05-08 RX ADMIN — SODIUM CHLORIDE 1000 MILLILITER(S): 9 INJECTION INTRAMUSCULAR; INTRAVENOUS; SUBCUTANEOUS at 11:11

## 2021-05-08 RX ADMIN — AMLODIPINE BESYLATE 5 MILLIGRAM(S): 2.5 TABLET ORAL at 05:48

## 2021-05-08 RX ADMIN — Medication 25 MILLIGRAM(S): at 17:50

## 2021-05-08 RX ADMIN — LEVETIRACETAM 420 MILLIGRAM(S): 250 TABLET, FILM COATED ORAL at 05:48

## 2021-05-08 RX ADMIN — PANTOPRAZOLE SODIUM 40 MILLIGRAM(S): 20 TABLET, DELAYED RELEASE ORAL at 11:29

## 2021-05-08 RX ADMIN — CHLORHEXIDINE GLUCONATE 1 APPLICATION(S): 213 SOLUTION TOPICAL at 05:48

## 2021-05-08 RX ADMIN — AMLODIPINE BESYLATE 5 MILLIGRAM(S): 2.5 TABLET ORAL at 09:44

## 2021-05-08 RX ADMIN — ENOXAPARIN SODIUM 40 MILLIGRAM(S): 100 INJECTION SUBCUTANEOUS at 05:49

## 2021-05-08 NOTE — PROGRESS NOTE ADULT - SUBJECTIVE AND OBJECTIVE BOX
OVERNIGHT EVENTS: events noted, on cardene 10 mg    Vital Signs Last 24 Hrs  T(C): 36.6 (08 May 2021 08:00), Max: 36.6 (07 May 2021 20:00)  T(F): 97.9 (08 May 2021 08:00), Max: 97.9 (08 May 2021 08:00)  HR: 96 (08 May 2021 08:00) (90 - 120)  BP: 139/60 (08 May 2021 08:00) (121/51 - 171/78)  BP(mean): 95 (08 May 2021 08:00) (70 - 116)  RR: 24 (08 May 2021 08:00) (14 - 32)  SpO2: 100% (08 May 2021 08:00) (97% - 100%)    PHYSICAL EXAMINATION:    GENERAL: ILL looking    HEENT: Head is normocephalic and atraumatic.     NECK: Supple.    LUNGS: Clear to auscultation without wheezing, rales or rhonchi; respirations unlabored    HEART: Regular rate and rhythm without murmur.    ABDOMEN: Soft, nontender, and nondistended.      EXTREMITIES: Without any cyanosis, clubbing, rash, lesions or edema.    NEUROLOGIC: r sided weakness    SKIN: No ulceration or induration present.      LABS:                        11.5   11.41 )-----------( 178      ( 07 May 2021 04:45 )             35.9     05-07    151<H>  |  115<H>  |  22<H>  ----------------------------<  132<H>  3.5   |  21  |  0.7    Ca    9.3      07 May 2021 15:50                            05-07-21 @ 07:01  -  05-08-21 @ 07:00  --------------------------------------------------------  IN: 1525 mL / OUT: 2375 mL / NET: -850 mL        MICROBIOLOGY:      MEDICATIONS  (STANDING):  amLODIPine   Tablet 5 milliGRAM(s) Oral daily  chlorhexidine 4% Liquid 1 Application(s) Topical <User Schedule>  enoxaparin Injectable 40 milliGRAM(s) SubCutaneous daily  levETIRAcetam  IVPB 500 milliGRAM(s) IV Intermittent every 12 hours  niCARdipine Infusion 5 mG/Hr (25 mL/Hr) IV Continuous <Continuous>  pantoprazole  Injectable 40 milliGRAM(s) IV Push daily    MEDICATIONS  (PRN):      RADIOLOGY & ADDITIONAL STUDIES:

## 2021-05-08 NOTE — PROGRESS NOTE ADULT - ASSESSMENT
Impression      Acute ischemic stroke sp tpa sc repeat head CT bleed bifrontal/ shift 0.4 sp hypertonic now off    - Neuro /FUP  - Goal  to 150  -  F/UP EEG   - F/UP CMP  - increase amlodipine  - ngt  - DVT prophylaxis  - poor prognosis  - dc Locke    Stroke unit

## 2021-05-08 NOTE — PROGRESS NOTE ADULT - SUBJECTIVE AND OBJECTIVE BOX
Neurocritical Care Progress Note    YANETH MERCADO    86y     [id number]   Daily     Daily Weight in k.5 (08 May 2021 05:00)  COVID-19 PCR: NotDetec (03 May 2021 14:58)    Patient is a 86y old  Male who presents with a chief complaint of Stroke (08 May 2021 08:15)    HPI:  86 year old right handed gentleman with no significant PMH presented to the ED with his granddaughter. Patient last known well at 11:30 AM. Patient found to be aphasic with right sided weakness at around noon.   Stroke code and code NI called on arrival. Patient with NIHSS 18. CTH without intracranial pathology.  Patient given IV TPA at 13:23. He was hypertensive on presentation () and was given Labetalol 20 mg IVP and started on nicardipine drip (now off) called for MICU  (03 May 2021 16:17)    Allergies:  No Known Allergies    PAST MEDICAL & SURGICAL HISTORY:  Gout  not on treatment, no recent flares    No significant past surgical history      Home Medications:    24 Hour Events:    Exam:    Current Medications:  chlorhexidine 4% Liquid 1 Application(s) Topical <User Schedule>  enoxaparin Injectable 40 milliGRAM(s) SubCutaneous daily  levETIRAcetam  IVPB 500 milliGRAM(s) IV Intermittent every 12 hours  metoprolol succinate ER 25 milliGRAM(s) Oral once  metoprolol tartrate 25 milliGRAM(s) Oral two times a day  niCARdipine Infusion 5 mG/Hr IV Continuous <Continuous>  pantoprazole  Injectable 40 milliGRAM(s) IV Push daily    mRS:  0 No symptoms at all  1 No significant disability despite symptoms; able to carry out all usual duties and activities without assistance  2 Slight disability; unable to carry out all previous activities, but able to look after own affairs  3 Moderate disability; requiring some help, but able to walk without assistance  4 Moderately severe disability; unable to walk without assistance and unable to attend to own bodily needs without assistance  5 Severe disability; bedridden, incontinent and requiring constant nursing care and attention  6 Dead    Vital Signs Last 24 Hrs  T(C): 36.7 (08 May 2021 12:00), Max: 36.7 (08 May 2021 12:00)  T(F): 98 (08 May 2021 12:00), Max: 98 (08 May 2021 12:00)  HR: 104 (08 May 2021 14:00) (90 - 120)  BP: 142/63 (08 May 2021 14:00) (121/51 - 160/68)  BP(mean): 90 (08 May 2021 14:00) (70 - 116)  RR: 16 (08 May 2021 14:00) (14 - 32)  SpO2: 97% (08 May 2021 14:00) (97% - 100%)     I/Os:  I&O's Detail    07 May 2021 07:01  -  08 May 2021 07:00  --------------------------------------------------------  IN:    IV PiggyBack: 100 mL    Jevity 1.2: 720 mL    NiCARdipine: 625 mL    sodium chloride 3%: 80 mL  Total IN: 1525 mL    OUT:    Indwelling Catheter - Urethral (mL): 2375 mL  Total OUT: 2375 mL    Total NET: -850 mL      08 May 2021 07:01  -  08 May 2021 14:40  --------------------------------------------------------  IN:    NiCARdipine: 100 mL  Total IN: 100 mL    OUT:    Indwelling Catheter - Urethral (mL): 50 mL  Total OUT: 50 mL    Total NET: 50 mL        Labs:                                      11.5   11.41 )-----------( 178      ( 07 May 2021 04:45 )             35.9      05-07    151<H>  |  115<H>  |  22<H>  ----------------------------<  132<H>  3.5   |  21  |  0.7    Ca    9.3      07 May 2021 15:50         Adult Advanced Hemodynamics Last 24 Hrs  CVP(mm Hg): --  CVP(cm H2O): --  CO: --  CI: --  PA: --  PA(mean): --  PCWP: --  SVR: --  SVRI: --  PVR: --  PVRI: --      Diagnostics/ Results:  Last CTH:    Last CTA/MRA:    Last CTP:    Last MRI:    Last TCD:    Last EEG:    Last Echo:    Last EKG:    Chest Xray:    ROS:  [X] A ten-point ROS was otherwise negative except as noted in HPI    Assessment:  85 y/o M w/ PMH gout and family history of stroke p/w acute onset aphasia, L gaze preference, R HP, and was in hypertensive emergency. Stroke code called; initial NIHSS 18. Initial CTH w/o pathology. Pt w/in window for tPA (labetalol given for HTN). MRI and repeat CT consistent with bifrontal bilateral hemorrhages (without clinical worsening), which are likely 2/2 to hemorrhagic conversion of infarction after administration of TPA. No NSx intervention at this time, as evacuation will not likely improve the patient's functional neurological prognosis. Stability scan shows stable bleed. vEEG overnight negative for subclinical seizures. Currently regulating normotension without nicardipine gtt. 3% held for Na+ 151 today. No need for salt tabs at this time. Patient sitting up in bed, awake and alert. Neuro exam unchanged from yesterday, however, remains globally aphasic. Not following commands. Neurologically stable and OK for downgrade to 3E.    Plan:  Neurology:  -OK for downgrade to 3E  -Neuro checks Q1; (hemorrhagic transformation post tPA). STAT CTH for acute change in neurological status  -NSx consult; no surgical intervention at this time, as evacuation will likely not improve the patient's functional neurological prognosis  -C/w Keppra 500 BID; seizure ppx  -Avoid AC/AP; bleeds likely secondary to hemorrhagic conversion of infarction after administration of TPA. DVT ppx OK  -Keep SBP goal 120-150; titrate nicardipine gtt PRN (currently off). Try to control HTN with PO meds (Norvasc) if able  -Mild hyponatremic; resolving. OK to stop 3% and assess sodium status. Salt tabs not needed at this time  -Monitor and treat lytes; Na+ last 151    Patient seen and case discussed with NCC attending; see attending attestation  Please call w/ questions  Lorie Nam NP  [phone]             Neurocritical Care Progress Note    YANETH MERCADO    86y     [id number]     Daily Weight in k.5 (08 May 2021 05:00)  COVID-19 PCR: NotDetec (03 May 2021 14:58)    Patient is a 86y old  Male who presents with a chief complaint of Stroke (08 May 2021 08:15)    HPI:  86 year old right handed gentleman with no significant PMH presented to the ED with his granddaughter. Patient last known well at 11:30 AM. Patient found to be aphasic with right sided weakness at around noon. Stroke code and code NI called on arrival. Patient with NIHSS 18. CTH without intracranial pathology. Patient given IV TPA at 13:23. He was hypertensive on presentation () and was given Labetalol 20 mg IVP and started on nicardipine drip (now off) called for MICU  (03 May 2021 16:17)    Allergies:  No Known Allergies    PAST MEDICAL & SURGICAL HISTORY:  Gout  not on treatment, no recent flares    No significant past surgical history    Exam:  Neurologic Exam:  Mental status: Awake, alert. Unchanged neuro exam from yesterday. Remains globally aphasic. Following some commands using pantomime   Language: BRITTA  Cranial nerves: Pupils equally round and reactive to light. Visual fields intact. Extraocular muscles intact, V1 through V3 intact bilaterally and symmetric. Face symmetric at rest. BRITTA hearing  Motor:  Normal bulk and tone. Strength:    1/5 in RUE  3/5 in LUE  1/5 in RLE  5/5 in LLE   strength BRITTA  Motor: No tremors or tics noted. Moves left UE/LE spontaneously  Sensation: Intact to noxious stimuli x4 extremities. No neglect noted  Coordination: BRITTA    Current Medications:  chlorhexidine 4% Liquid 1 Application(s) Topical <User Schedule>  enoxaparin Injectable 40 milliGRAM(s) SubCutaneous daily  levETIRAcetam  IVPB 500 milliGRAM(s) IV Intermittent every 12 hours  metoprolol succinate ER 25 milliGRAM(s) Oral once  metoprolol tartrate 25 milliGRAM(s) Oral two times a day  niCARdipine Infusion 5 mG/Hr IV Continuous <Continuous>  pantoprazole  Injectable 40 milliGRAM(s) IV Push daily    Vital Signs Last 24 Hrs  T(C): 36.7 (08 May 2021 12:00), Max: 36.7 (08 May 2021 12:00)  T(F): 98 (08 May 2021 12:00), Max: 98 (08 May 2021 12:00)  HR: 104 (08 May 2021 14:00) (90 - 120)  BP: 142/63 (08 May 2021 14:00) (121/51 - 160/68)  BP(mean): 90 (08 May 2021 14:00) (70 - 116)  RR: 16 (08 May 2021 14:00) (14 - 32)  SpO2: 97% (08 May 2021 14:00) (97% - 100%)     I/Os:  I&O's Detail    07 May 2021 07:01  -  08 May 2021 07:00  --------------------------------------------------------  IN:    IV PiggyBack: 100 mL    Jevity 1.2: 720 mL    NiCARdipine: 625 mL    sodium chloride 3%: 80 mL  Total IN: 1525 mL    OUT:    Indwelling Catheter - Urethral (mL): 2375 mL  Total OUT: 2375 mL    Total NET: -850 mL      08 May 2021 07:01  -  08 May 2021 14:40  --------------------------------------------------------  IN:    NiCARdipine: 100 mL  Total IN: 100 mL    OUT:    Indwelling Catheter - Urethral (mL): 50 mL  Total OUT: 50 mL    Total NET: 50 mL                       11.5   11.41 )-----------( 178      ( 07 May 2021 04:45 )             35.9      -    151<H>  |  115<H>  |  22<H>  ----------------------------<  132<H>  3.5   |  21  |  0.7    Ca    9.3      07 May 2021 15:50    Diagnostics/ Results:  Last CTH: < from: CT Head No Cont (21 @ 20:56) >  IMPRESSION:      Since 2021 12:10 PM:    Again noted multiple stable bilateral lobar hematomas, with associated stable mass effect from the right frontal hematoma with associated 0.4 cmmidline shift to the left.    Stable examination.    < end of copied text >    Last CTA/MRA: < from: CT Angio Neck w/ IV Cont (21 @ 14:10) >  IMPRESSION:    1.  No evidence of acute large vessel occlusion. Normal perfusion images.    2.  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    3.  Moderate (about 60% stenosis) of the right ICA origin.    4.  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    < end of copied text >    Last CTP: < from: CT Perfusion w/ Maps w/ IV Cont (21 @ 13:56) >  IMPRESSION:    1.  No evidence of acute large vessel occlusion. Normal perfusion images.    2.  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    3.  Moderate (about 60% stenosis) of the right ICA origin.    4.  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    < end of copied text >    Last MRI: < from: MR Head w/wo IV Cont (21 @ 14:34) >  IMPRESSION:    New large bifrontal lobar hemorrhage with hematocrit level, right larger than left, since the prior CT from 5/3/2021. Additional smaller focus of hemorrhage in the right occipital lobe. Also noted are multiple scattered punctate susceptibility signals in the bilateral cerebral hemispheres. These constellations of findings are suspicious for lobar hemorrhage associated with amyloid angiopathy. Associated moderate surrounding edema and mass effect, with leftward midline shift measuring 0.4 cm.    Small foci of acute infarct in the left temporal and right parietal lobes.    Linear signal abnormality and enhancement along the right superior cerebral sulcus and precentral sulci could reflect subarachnoid hemorrhage.    < end of copied text >    Last TCD: n/a    Last EEG: VEEG in the last 24 hours: 21    Background-----------------low amplitude ,continues reaching frequencies in the range of 5-6 hz  that shows reactivity     Focal and generalized slowing----------------   mild to moderate generalized   slowing. Bilateral independent anterior quadrants focal slowing    Interictal activity------------------ none    Events--------------------- none    Seizures---------------  none    Impression:  abnormal as above    Last Echo: < from: TTE Echo Complete w/o Contrast w/ Doppler (21 @ 10:16) >  Summary:   1. Normal global left ventricular systolic function.   2. LV Ejection Fraction by Solomon's Method with a biplane EF of 72 %.   3. Mild left ventricular hypertrophy.   4. Mildly increased LV wall thickness.   5. Normal left ventricular internal cavity size.   6. Normal left atrial size.   7. Normal right atrial size.   8. There is no evidence of pericardial effusion.   9. Mild mitral annular calcification.  10. Mild mitral valve regurgitation.  11. Mild thickening of the anterior and posterior mitral valve leaflets.  12. Mild tricuspid regurgitation.  13. Mild to moderate aortic regurgitation.  14. Color flow doppler and intravenous injection of agitated saline demonstrates the presence of an intact intra atrial septum.    < end of copied text >    Last EKG: < from: 12 Lead ECG (21 @ 14:15) >  Diagnosis Line Normal sinus rhythm  Normal ECG    < end of copied text >    Chest Xray: < from: Xray Chest 1 View-PORTABLE IMMEDIATE (Xray Chest 1 View-PORTABLE IMMEDIATE .) (21 @ 00:44) >  Impression:    No radiographic evidence of acute cardiopulmonary disease.    Support devices as described.    < end of copied text >    ROS:  [X] A ten-point ROS was otherwise negative except as noted in HPI    Assessment:  87 y/o M w/ PMH gout and family history of stroke p/w acute onset aphasia, L gaze preference, R HP, and was in hypertensive emergency. Stroke code called; initial NIHSS 18. Initial CTH w/o pathology. Pt w/in window for tPA (labetalol given for HTN). MRI and repeat CT consistent with bifrontal bilateral hemorrhages (without clinical worsening), which are likely 2/2 to hemorrhagic conversion of infarction after administration of TPA. No NSx intervention at this time, as evacuation will not likely improve the patient's functional neurological prognosis. Stability scan shows stable bleed. vEEG overnight negative for subclinical seizures. Currently regulating normotension without nicardipine gtt. 3% held for Na+ 151 today. No need for salt tabs at this time. Patient sitting up in bed, awake and alert. Neuro exam unchanged from yesterday, however, remains globally aphasic. Not following commands. Neurologically stable and OK for downgrade to 3E.    Plan:  Neurology:  -OK for downgrade to 3E  -Neuro checks Q1; (hemorrhagic transformation post tPA). STAT CTH for acute change in neurological status  -NSx consult; no surgical intervention at this time, as evacuation will likely not improve the patient's functional neurological prognosis  -C/w Keppra 500 BID; seizure ppx  -Avoid AC/AP; bleeds likely secondary to hemorrhagic conversion of infarction after administration of TPA. DVT ppx OK  -Keep SBP goal 120-150; titrate nicardipine gtt PRN (currently off). Try to control HTN with PO meds (Norvasc) if able  -Mild hyponatremic; resolving. OK to stop 3% and assess sodium status. Salt tabs not needed at this time  -Monitor and treat lytes; Na+ last 151    Patient seen and case discussed with NCC attending; see attending attestation  Please call w/ questions  Lorie Nam, ROXY  [phone]             Neurocritical Care Progress Note    YANETH MERCADO    86y     [id number]     Daily Weight in k.5 (08 May 2021 05:00)  COVID-19 PCR: NotDetec (03 May 2021 14:58)    Patient is a 86y old  Male who presents with a chief complaint of Stroke (08 May 2021 08:15)    HPI:  86 year old right handed gentleman with no significant PMH presented to the ED with his granddaughter. Patient last known well at 11:30 AM. Patient found to be aphasic with right sided weakness at around noon. Stroke code and code NI called on arrival. Patient with NIHSS 18. CTH without intracranial pathology. Patient given IV TPA at 13:23. He was hypertensive on presentation () and was given Labetalol 20 mg IVP and started on nicardipine drip (now off) called for MICU  (03 May 2021 16:17)    Allergies:  No Known Allergies    PAST MEDICAL & SURGICAL HISTORY:  Gout  not on treatment, no recent flares    No significant past surgical history    Exam:  Neurologic Exam:  Mental status: Awake, alert. Unchanged neuro exam from yesterday. Remains globally aphasic. Following some commands using pantomime   Language: BRITTA  Cranial nerves: Pupils equally round and reactive to light. Visual fields intact. Extraocular muscles intact, V1 through V3 intact bilaterally and symmetric. Face symmetric at rest. BRITTA hearing  Motor:  Normal bulk and tone. Strength:    1/5 in RUE  3/5 in LUE  1/5 in RLE  5/5 in LLE   strength BRITTA  Motor: No tremors or tics noted. Moves left UE/LE spontaneously  Sensation: Intact to noxious stimuli x4 extremities. No neglect noted  Coordination: BRITTA    Current Medications:  chlorhexidine 4% Liquid 1 Application(s) Topical <User Schedule>  enoxaparin Injectable 40 milliGRAM(s) SubCutaneous daily  levETIRAcetam  IVPB 500 milliGRAM(s) IV Intermittent every 12 hours  metoprolol succinate ER 25 milliGRAM(s) Oral once  metoprolol tartrate 25 milliGRAM(s) Oral two times a day  niCARdipine Infusion 5 mG/Hr IV Continuous <Continuous>  pantoprazole  Injectable 40 milliGRAM(s) IV Push daily    Vital Signs Last 24 Hrs  T(C): 36.7 (08 May 2021 12:00), Max: 36.7 (08 May 2021 12:00)  T(F): 98 (08 May 2021 12:00), Max: 98 (08 May 2021 12:00)  HR: 104 (08 May 2021 14:00) (90 - 120)  BP: 142/63 (08 May 2021 14:00) (121/51 - 160/68)  BP(mean): 90 (08 May 2021 14:00) (70 - 116)  RR: 16 (08 May 2021 14:00) (14 - 32)  SpO2: 97% (08 May 2021 14:00) (97% - 100%)     I/Os:  I&O's Detail    07 May 2021 07:01  -  08 May 2021 07:00  --------------------------------------------------------  IN:    IV PiggyBack: 100 mL    Jevity 1.2: 720 mL    NiCARdipine: 625 mL    sodium chloride 3%: 80 mL  Total IN: 1525 mL    OUT:    Indwelling Catheter - Urethral (mL): 2375 mL  Total OUT: 2375 mL    Total NET: -850 mL      08 May 2021 07:01  -  08 May 2021 14:40  --------------------------------------------------------  IN:    NiCARdipine: 100 mL  Total IN: 100 mL    OUT:    Indwelling Catheter - Urethral (mL): 50 mL  Total OUT: 50 mL    Total NET: 50 mL                       11.5   11.41 )-----------( 178      ( 07 May 2021 04:45 )             35.9      -    151<H>  |  115<H>  |  22<H>  ----------------------------<  132<H>  3.5   |  21  |  0.7    Ca    9.3      07 May 2021 15:50    Diagnostics/ Results:  Last CTH: < from: CT Head No Cont (21 @ 20:56) >  IMPRESSION:      Since 2021 12:10 PM:    Again noted multiple stable bilateral lobar hematomas, with associated stable mass effect from the right frontal hematoma with associated 0.4 cmmidline shift to the left.    Stable examination.    < end of copied text >    Last CTA/MRA: < from: CT Angio Neck w/ IV Cont (21 @ 14:10) >  IMPRESSION:    1.  No evidence of acute large vessel occlusion. Normal perfusion images.    2.  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    3.  Moderate (about 60% stenosis) of the right ICA origin.    4.  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    < end of copied text >    Last CTP: < from: CT Perfusion w/ Maps w/ IV Cont (21 @ 13:56) >  IMPRESSION:    1.  No evidence of acute large vessel occlusion. Normal perfusion images.    2.  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    3.  Moderate (about 60% stenosis) of the right ICA origin.    4.  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    < end of copied text >    Last MRI: < from: MR Head w/wo IV Cont (21 @ 14:34) >  IMPRESSION:    New large bifrontal lobar hemorrhage with hematocrit level, right larger than left, since the prior CT from 5/3/2021. Additional smaller focus of hemorrhage in the right occipital lobe. Also noted are multiple scattered punctate susceptibility signals in the bilateral cerebral hemispheres. These constellations of findings are suspicious for lobar hemorrhage associated with amyloid angiopathy. Associated moderate surrounding edema and mass effect, with leftward midline shift measuring 0.4 cm.    Small foci of acute infarct in the left temporal and right parietal lobes.    Linear signal abnormality and enhancement along the right superior cerebral sulcus and precentral sulci could reflect subarachnoid hemorrhage.    < end of copied text >    Last TCD: n/a    Last EEG: VEEG in the last 24 hours: 21    Background-----------------low amplitude ,continues reaching frequencies in the range of 5-6 hz  that shows reactivity     Focal and generalized slowing----------------   mild to moderate generalized   slowing. Bilateral independent anterior quadrants focal slowing    Interictal activity------------------ none    Events--------------------- none    Seizures---------------  none    Impression:  abnormal as above    Last Echo: < from: TTE Echo Complete w/o Contrast w/ Doppler (21 @ 10:16) >  Summary:   1. Normal global left ventricular systolic function.   2. LV Ejection Fraction by Solomon's Method with a biplane EF of 72 %.   3. Mild left ventricular hypertrophy.   4. Mildly increased LV wall thickness.   5. Normal left ventricular internal cavity size.   6. Normal left atrial size.   7. Normal right atrial size.   8. There is no evidence of pericardial effusion.   9. Mild mitral annular calcification.  10. Mild mitral valve regurgitation.  11. Mild thickening of the anterior and posterior mitral valve leaflets.  12. Mild tricuspid regurgitation.  13. Mild to moderate aortic regurgitation.  14. Color flow doppler and intravenous injection of agitated saline demonstrates the presence of an intact intra atrial septum.    < end of copied text >    Last EKG: < from: 12 Lead ECG (21 @ 14:15) >  Diagnosis Line Normal sinus rhythm  Normal ECG    < end of copied text >    Chest Xray: < from: Xray Chest 1 View-PORTABLE IMMEDIATE (Xray Chest 1 View-PORTABLE IMMEDIATE .) (21 @ 00:44) >  Impression:    No radiographic evidence of acute cardiopulmonary disease.    Support devices as described.    < end of copied text >    ROS:  [X] A ten-point ROS was otherwise negative except as noted in HPI    Assessment:  85 y/o M w/ PMH gout and family history of stroke p/w acute onset aphasia, L gaze preference, R HP, and was in hypertensive emergency. Stroke code called; initial NIHSS 18. Initial CTH w/o pathology. Pt w/in window for tPA (labetalol given for HTN). MRI and repeat CT consistent with bifrontal bilateral hemorrhages (without clinical worsening), which are likely 2/2 to hemorrhagic conversion of infarction after administration of TPA. No NSx intervention at this time, as evacuation will not likely improve the patient's functional neurological prognosis. Stability scan shows stable bleed. vEEG overnight negative for subclinical seizures. Currently regulating normotension without nicardipine gtt. 3% held for Na+ 151 today. No need for salt tabs at this time. Patient sitting up in bed, awake and alert. Neuro exam unchanged from yesterday, however, remains globally aphasic. Not following commands. Neurologically stable and OK for downgrade to 3E.    Plan:  Neurology:  -OK for downgrade to 3E  -Neuro checks Q4; (hemorrhagic transformation post tPA). STAT CTH for acute change in neurological status  -NSx consult; no surgical intervention at this time, as evacuation will likely not improve the patient's functional neurological prognosis  -C/w Keppra 500 BID; seizure ppx  -Avoid AC/AP;   -Keep SBP goal 120-150; titrate nicardipine gtt PRN (currently off). Try to control HTN with PO meds (Norvasc) if able  -Mild hyponatremic; resolving. OK to stop 3% and assess sodium status. Salt tabs not needed at this time  -Monitor and treat lytes; Na+ last 151    Patient seen and case discussed with NCC attending; see attending attestation  Please call w/ questions  Lorie Nam NP  [phone]

## 2021-05-08 NOTE — PROGRESS NOTE ADULT - ASSESSMENT
Impression      Acute ischemic stroke sp tpa sc repeat head CT bleed bifrontal/ shift 0.4 sp hypertonic now off    - Neuro /FUP  - Goal  to 150  -  F/UP EEG   - F/UP CMP  - increase amlodipine  - ngt  - DVT prophylaxis  - poor prognosis  - dc [place]    Stroke unit

## 2021-05-08 NOTE — PHARMACOTHERAPY INTERVENTION NOTE - COMMENTS
-recommended changing pantoprazole IV to 40mg suspension daily, pt on feeds
metoprolol ER 25mg x1, being administered via gt, recommended changing to 25mg IR gt

## 2021-05-08 NOTE — CHART NOTE - NSCHARTNOTEFT_GEN_A_CORE
86 year old right handed gentleman with no significant PMH presented to the ED with his granddaughter. Patient last known well at 11:30 AM. Patient found to be aphasic with right sided weakness at around noon.   Stroke code and code NI called on arrival. Patient with NIHSS 18. CTH without intracranial pathology.  Patient given IV TPA at 13:23. He was hypertensive on presentation () and was given Labetalol 20 mg IVP and started on nicardipine drip (now off) called for MICU     ICU course:  Patient underwent repeat head CT 24h post tpa, which demonstrated multiple areas of intraparenchymal hemorrhage. His neurologic exam initially worsened, but then improved over coming days. BP was maintained on nicardipine drip which was eventually switched to oral meds. Patient regained partial streng 86 year old right handed gentleman with no significant PMH presented to the ED with his granddaughter. Patient last known well at 11:30 AM. Patient found to be aphasic with right sided weakness at around noon.   Stroke code and code NI called on arrival. Patient with NIHSS 18. CTH without intracranial pathology.  Patient given IV TPA at 13:23. He was hypertensive on presentation () and was given Labetalol 20 mg IVP and started on nicardipine drip (now off) called for MICU     ICU course:  Patient underwent repeat head CT 24h post tpa, which demonstrated multiple areas of intraparenchymal hemorrhage. His neurologic exam initially worsened, but then improved over coming days. BP was maintained on nicardipine drip which was eventually switched to oral meds. Patient regained partial strength in his upper and lower right extremity and was stable for discharge to floor for further management.

## 2021-05-09 LAB
ANION GAP SERPL CALC-SCNC: 17 MMOL/L — HIGH (ref 7–14)
BASOPHILS # BLD AUTO: 0.04 K/UL — SIGNIFICANT CHANGE UP (ref 0–0.2)
BASOPHILS NFR BLD AUTO: 0.4 % — SIGNIFICANT CHANGE UP (ref 0–1)
BUN SERPL-MCNC: 30 MG/DL — HIGH (ref 10–20)
CALCIUM SERPL-MCNC: 9 MG/DL — SIGNIFICANT CHANGE UP (ref 8.5–10.1)
CHLORIDE SERPL-SCNC: 117 MMOL/L — HIGH (ref 98–110)
CO2 SERPL-SCNC: 20 MMOL/L — SIGNIFICANT CHANGE UP (ref 17–32)
CREAT SERPL-MCNC: 0.8 MG/DL — SIGNIFICANT CHANGE UP (ref 0.7–1.5)
EOSINOPHIL # BLD AUTO: 0.09 K/UL — SIGNIFICANT CHANGE UP (ref 0–0.7)
EOSINOPHIL NFR BLD AUTO: 0.8 % — SIGNIFICANT CHANGE UP (ref 0–8)
GLUCOSE BLDC GLUCOMTR-MCNC: 100 MG/DL — HIGH (ref 70–99)
GLUCOSE BLDC GLUCOMTR-MCNC: 121 MG/DL — HIGH (ref 70–99)
GLUCOSE BLDC GLUCOMTR-MCNC: 189 MG/DL — HIGH (ref 70–99)
GLUCOSE BLDC GLUCOMTR-MCNC: 75 MG/DL — SIGNIFICANT CHANGE UP (ref 70–99)
GLUCOSE BLDC GLUCOMTR-MCNC: 86 MG/DL — SIGNIFICANT CHANGE UP (ref 70–99)
GLUCOSE BLDC GLUCOMTR-MCNC: 89 MG/DL — SIGNIFICANT CHANGE UP (ref 70–99)
GLUCOSE SERPL-MCNC: 100 MG/DL — HIGH (ref 70–99)
HCT VFR BLD CALC: 37 % — LOW (ref 42–52)
HGB BLD-MCNC: 11.3 G/DL — LOW (ref 14–18)
IMM GRANULOCYTES NFR BLD AUTO: 0.7 % — HIGH (ref 0.1–0.3)
LYMPHOCYTES # BLD AUTO: 0.97 K/UL — LOW (ref 1.2–3.4)
LYMPHOCYTES # BLD AUTO: 8.9 % — LOW (ref 20.5–51.1)
MCHC RBC-ENTMCNC: 24.4 PG — LOW (ref 27–31)
MCHC RBC-ENTMCNC: 30.5 G/DL — LOW (ref 32–37)
MCV RBC AUTO: 79.9 FL — LOW (ref 80–94)
MONOCYTES # BLD AUTO: 0.72 K/UL — HIGH (ref 0.1–0.6)
MONOCYTES NFR BLD AUTO: 6.6 % — SIGNIFICANT CHANGE UP (ref 1.7–9.3)
NEUTROPHILS # BLD AUTO: 8.94 K/UL — HIGH (ref 1.4–6.5)
NEUTROPHILS NFR BLD AUTO: 82.6 % — HIGH (ref 42.2–75.2)
NRBC # BLD: 0 /100 WBCS — SIGNIFICANT CHANGE UP (ref 0–0)
PLATELET # BLD AUTO: 220 K/UL — SIGNIFICANT CHANGE UP (ref 130–400)
POTASSIUM SERPL-MCNC: 3.8 MMOL/L — SIGNIFICANT CHANGE UP (ref 3.5–5)
POTASSIUM SERPL-SCNC: 3.8 MMOL/L — SIGNIFICANT CHANGE UP (ref 3.5–5)
RBC # BLD: 4.63 M/UL — LOW (ref 4.7–6.1)
RBC # FLD: 15.8 % — HIGH (ref 11.5–14.5)
SODIUM SERPL-SCNC: 154 MMOL/L — HIGH (ref 135–146)
WBC # BLD: 10.84 K/UL — HIGH (ref 4.8–10.8)
WBC # FLD AUTO: 10.84 K/UL — HIGH (ref 4.8–10.8)

## 2021-05-09 PROCEDURE — 99233 SBSQ HOSP IP/OBS HIGH 50: CPT

## 2021-05-09 PROCEDURE — 71045 X-RAY EXAM CHEST 1 VIEW: CPT | Mod: 26

## 2021-05-09 RX ORDER — DEXTROSE 50 % IN WATER 50 %
25 SYRINGE (ML) INTRAVENOUS ONCE
Refills: 0 | Status: COMPLETED | OUTPATIENT
Start: 2021-05-09 | End: 2021-05-09

## 2021-05-09 RX ORDER — SODIUM CHLORIDE 9 MG/ML
1000 INJECTION, SOLUTION INTRAVENOUS
Refills: 0 | Status: DISCONTINUED | OUTPATIENT
Start: 2021-05-09 | End: 2021-05-10

## 2021-05-09 RX ADMIN — Medication 25 MILLIGRAM(S): at 05:40

## 2021-05-09 RX ADMIN — LEVETIRACETAM 420 MILLIGRAM(S): 250 TABLET, FILM COATED ORAL at 17:57

## 2021-05-09 RX ADMIN — PANTOPRAZOLE SODIUM 40 MILLIGRAM(S): 20 TABLET, DELAYED RELEASE ORAL at 17:59

## 2021-05-09 RX ADMIN — AMLODIPINE BESYLATE 10 MILLIGRAM(S): 2.5 TABLET ORAL at 05:41

## 2021-05-09 RX ADMIN — LEVETIRACETAM 420 MILLIGRAM(S): 250 TABLET, FILM COATED ORAL at 05:40

## 2021-05-09 RX ADMIN — CHLORHEXIDINE GLUCONATE 1 APPLICATION(S): 213 SOLUTION TOPICAL at 05:41

## 2021-05-09 RX ADMIN — Medication 25 MILLILITER(S): at 13:02

## 2021-05-09 RX ADMIN — Medication 25 MILLIGRAM(S): at 17:59

## 2021-05-09 RX ADMIN — ENOXAPARIN SODIUM 40 MILLIGRAM(S): 100 INJECTION SUBCUTANEOUS at 05:41

## 2021-05-09 NOTE — PROGRESS NOTE ADULT - SUBJECTIVE AND OBJECTIVE BOX
INTERVAL HPI/OVERNIGHT EVENTS:    SUBJECTIVE: Patient seen and examined at bedside.     unable to obtain ros    OBJECTIVE:    VITAL SIGNS:  Vital Signs Last 24 Hrs  T(C): 36.7 (09 May 2021 05:09), Max: 36.9 (08 May 2021 16:00)  T(F): 98.1 (09 May 2021 05:09), Max: 98.4 (08 May 2021 16:00)  HR: 88 (09 May 2021 05:09) (72 - 120)  BP: 158/71 (09 May 2021 05:09) (123/52 - 158/71)  BP(mean): 74 (08 May 2021 23:00) (74 - 106)  RR: 18 (09 May 2021 05:09) (13 - 20)  SpO2: 99% (08 May 2021 23:47) (97% - 100%)      PHYSICAL EXAM:    General: does not follow commands, nad  HEENT: NC/AT; PERRL, clear conjunctiva  Neck: supple  Respiratory: CTA b/l  Cardiovascular: +S1/S2; RRR  Abdomen: soft, NT/ND; +BS x4  Extremities: WWP, 2+ peripheral pulses b/l; no LE edema  Skin: normal color and turgor; no rash  Neurological:    MEDICATIONS:  MEDICATIONS  (STANDING):  amLODIPine   Tablet 10 milliGRAM(s) Oral daily  chlorhexidine 4% Liquid 1 Application(s) Topical <User Schedule>  dextrose 5%. 1000 milliLiter(s) (75 mL/Hr) IV Continuous <Continuous>  enoxaparin Injectable 40 milliGRAM(s) SubCutaneous daily  levETIRAcetam  IVPB 500 milliGRAM(s) IV Intermittent every 12 hours  metoprolol tartrate 25 milliGRAM(s) Oral two times a day  pantoprazole   Suspension 40 milliGRAM(s) Oral daily    MEDICATIONS  (PRN):      ALLERGIES:  Allergies    No Known Allergies    Intolerances        LABS:                        11.3   10.84 )-----------( 220      ( 09 May 2021 07:07 )             37.0     Hemoglobin: 11.3 g/dL (05-09 @ 07:07)  Hemoglobin: 11.5 g/dL (05-07 @ 04:45)  Hemoglobin: 11.6 g/dL (05-06 @ 04:50)  Hemoglobin: 11.7 g/dL (05-05 @ 04:33)    CBC Full  -  ( 09 May 2021 07:07 )  WBC Count : 10.84 K/uL  RBC Count : 4.63 M/uL  Hemoglobin : 11.3 g/dL  Hematocrit : 37.0 %  Platelet Count - Automated : 220 K/uL  Mean Cell Volume : 79.9 fL  Mean Cell Hemoglobin : 24.4 pg  Mean Cell Hemoglobin Concentration : 30.5 g/dL  Auto Neutrophil # : 8.94 K/uL  Auto Lymphocyte # : 0.97 K/uL  Auto Monocyte # : 0.72 K/uL  Auto Eosinophil # : 0.09 K/uL  Auto Basophil # : 0.04 K/uL  Auto Neutrophil % : 82.6 %  Auto Lymphocyte % : 8.9 %  Auto Monocyte % : 6.6 %  Auto Eosinophil % : 0.8 %  Auto Basophil % : 0.4 %    05-07    151<H>  |  115<H>  |  22<H>  ----------------------------<  132<H>  3.5   |  21  |  0.7    Ca    9.3      07 May 2021 15:50      Creatinine Trend: 0.7<--, 0.7<--, 0.7<--, 0.8<--, 0.8<--, 0.8<--        hs Troponin:              CSF:                      EKG:   MICROBIOLOGY:    IMAGING:      Labs, imaging, EKG personally reviewed    RADIOLOGY & ADDITIONAL TESTS: Reviewed.

## 2021-05-09 NOTE — PROGRESS NOTE ADULT - ASSESSMENT
86M PMHx gout here with aphasia, R sided weakness; s/p tpa. BL frontal IPH, SAH, c/b midline shift; acute CVA L temporal, R pareital.    #IPH, BL frontal, SAH c/b midline shift  in setting of acute CVA L temporal, R parietal s/p tpa  mri, cth noted; repeat cth with stable findings  veeg no overt sz  keppra 500 bid  no intervention per neurosx  f/u neuro  #HTN urgency  improved s/p nicardipine gtt  sbp goal 120s - 150s  norvasc 10  lopressor 25 bid  #Gout  controlled off medications  #DVT ppx  lovenox    #Progress Note Handoff:  Pending (specify):  Consults_________, Tests________, Test Results_______, Other____f/u neuro_____  Family discussion:   Disposition: Home___/SNF___/Other________/Unknown at this time____x____

## 2021-05-10 LAB
ANION GAP SERPL CALC-SCNC: 15 MMOL/L — HIGH (ref 7–14)
ANION GAP SERPL CALC-SCNC: 9 MMOL/L — SIGNIFICANT CHANGE UP (ref 7–14)
BASOPHILS # BLD AUTO: 0.05 K/UL — SIGNIFICANT CHANGE UP (ref 0–0.2)
BASOPHILS NFR BLD AUTO: 0.6 % — SIGNIFICANT CHANGE UP (ref 0–1)
BUN SERPL-MCNC: 37 MG/DL — HIGH (ref 10–20)
BUN SERPL-MCNC: 39 MG/DL — HIGH (ref 10–20)
CALCIUM SERPL-MCNC: 9 MG/DL — SIGNIFICANT CHANGE UP (ref 8.5–10.1)
CALCIUM SERPL-MCNC: 9.1 MG/DL — SIGNIFICANT CHANGE UP (ref 8.5–10.1)
CHLORIDE SERPL-SCNC: 117 MMOL/L — HIGH (ref 98–110)
CHLORIDE SERPL-SCNC: 119 MMOL/L — HIGH (ref 98–110)
CO2 SERPL-SCNC: 23 MMOL/L — SIGNIFICANT CHANGE UP (ref 17–32)
CO2 SERPL-SCNC: 29 MMOL/L — SIGNIFICANT CHANGE UP (ref 17–32)
CREAT SERPL-MCNC: 0.8 MG/DL — SIGNIFICANT CHANGE UP (ref 0.7–1.5)
CREAT SERPL-MCNC: 0.9 MG/DL — SIGNIFICANT CHANGE UP (ref 0.7–1.5)
EOSINOPHIL # BLD AUTO: 0.08 K/UL — SIGNIFICANT CHANGE UP (ref 0–0.7)
EOSINOPHIL NFR BLD AUTO: 0.9 % — SIGNIFICANT CHANGE UP (ref 0–8)
GLUCOSE BLDC GLUCOMTR-MCNC: 102 MG/DL — HIGH (ref 70–99)
GLUCOSE BLDC GLUCOMTR-MCNC: 115 MG/DL — HIGH (ref 70–99)
GLUCOSE BLDC GLUCOMTR-MCNC: 141 MG/DL — HIGH (ref 70–99)
GLUCOSE BLDC GLUCOMTR-MCNC: 97 MG/DL — SIGNIFICANT CHANGE UP (ref 70–99)
GLUCOSE SERPL-MCNC: 116 MG/DL — HIGH (ref 70–99)
GLUCOSE SERPL-MCNC: 99 MG/DL — SIGNIFICANT CHANGE UP (ref 70–99)
HCT VFR BLD CALC: 41.4 % — LOW (ref 42–52)
HGB BLD-MCNC: 12.9 G/DL — LOW (ref 14–18)
IMM GRANULOCYTES NFR BLD AUTO: 0.7 % — HIGH (ref 0.1–0.3)
LYMPHOCYTES # BLD AUTO: 1.31 K/UL — SIGNIFICANT CHANGE UP (ref 1.2–3.4)
LYMPHOCYTES # BLD AUTO: 15.1 % — LOW (ref 20.5–51.1)
MAGNESIUM SERPL-MCNC: 2.8 MG/DL — HIGH (ref 1.8–2.4)
MCHC RBC-ENTMCNC: 24.9 PG — LOW (ref 27–31)
MCHC RBC-ENTMCNC: 31.2 G/DL — LOW (ref 32–37)
MCV RBC AUTO: 79.8 FL — LOW (ref 80–94)
MONOCYTES # BLD AUTO: 0.66 K/UL — HIGH (ref 0.1–0.6)
MONOCYTES NFR BLD AUTO: 7.6 % — SIGNIFICANT CHANGE UP (ref 1.7–9.3)
NEUTROPHILS # BLD AUTO: 6.54 K/UL — HIGH (ref 1.4–6.5)
NEUTROPHILS NFR BLD AUTO: 75.1 % — SIGNIFICANT CHANGE UP (ref 42.2–75.2)
NRBC # BLD: 0 /100 WBCS — SIGNIFICANT CHANGE UP (ref 0–0)
PLATELET # BLD AUTO: 239 K/UL — SIGNIFICANT CHANGE UP (ref 130–400)
POTASSIUM SERPL-MCNC: 3.7 MMOL/L — SIGNIFICANT CHANGE UP (ref 3.5–5)
POTASSIUM SERPL-MCNC: 3.8 MMOL/L — SIGNIFICANT CHANGE UP (ref 3.5–5)
POTASSIUM SERPL-SCNC: 3.7 MMOL/L — SIGNIFICANT CHANGE UP (ref 3.5–5)
POTASSIUM SERPL-SCNC: 3.8 MMOL/L — SIGNIFICANT CHANGE UP (ref 3.5–5)
RBC # BLD: 5.19 M/UL — SIGNIFICANT CHANGE UP (ref 4.7–6.1)
RBC # FLD: 15.8 % — HIGH (ref 11.5–14.5)
SODIUM SERPL-SCNC: 155 MMOL/L — HIGH (ref 135–146)
SODIUM SERPL-SCNC: 157 MMOL/L — HIGH (ref 135–146)
WBC # BLD: 8.7 K/UL — SIGNIFICANT CHANGE UP (ref 4.8–10.8)
WBC # FLD AUTO: 8.7 K/UL — SIGNIFICANT CHANGE UP (ref 4.8–10.8)

## 2021-05-10 PROCEDURE — 99232 SBSQ HOSP IP/OBS MODERATE 35: CPT

## 2021-05-10 PROCEDURE — 99231 SBSQ HOSP IP/OBS SF/LOW 25: CPT

## 2021-05-10 PROCEDURE — 70450 CT HEAD/BRAIN W/O DYE: CPT | Mod: 26

## 2021-05-10 RX ORDER — LOSARTAN POTASSIUM 100 MG/1
25 TABLET, FILM COATED ORAL DAILY
Refills: 0 | Status: DISCONTINUED | OUTPATIENT
Start: 2021-05-10 | End: 2021-05-12

## 2021-05-10 RX ADMIN — CHLORHEXIDINE GLUCONATE 1 APPLICATION(S): 213 SOLUTION TOPICAL at 06:23

## 2021-05-10 RX ADMIN — AMLODIPINE BESYLATE 10 MILLIGRAM(S): 2.5 TABLET ORAL at 06:22

## 2021-05-10 RX ADMIN — ENOXAPARIN SODIUM 40 MILLIGRAM(S): 100 INJECTION SUBCUTANEOUS at 06:22

## 2021-05-10 RX ADMIN — PANTOPRAZOLE SODIUM 40 MILLIGRAM(S): 20 TABLET, DELAYED RELEASE ORAL at 13:23

## 2021-05-10 RX ADMIN — SODIUM CHLORIDE 75 MILLILITER(S): 9 INJECTION, SOLUTION INTRAVENOUS at 08:30

## 2021-05-10 RX ADMIN — Medication 25 MILLIGRAM(S): at 18:39

## 2021-05-10 RX ADMIN — Medication 25 MILLIGRAM(S): at 06:22

## 2021-05-10 RX ADMIN — LEVETIRACETAM 420 MILLIGRAM(S): 250 TABLET, FILM COATED ORAL at 18:39

## 2021-05-10 RX ADMIN — LEVETIRACETAM 420 MILLIGRAM(S): 250 TABLET, FILM COATED ORAL at 06:23

## 2021-05-10 NOTE — SWALLOW BEDSIDE ASSESSMENT ADULT - SWALLOW EVAL: RECOMMENDED DIET
pt can have small amounts of water and applesauce with RN only. no diet recommendations at this time
NPO w/ NGT
NPO w/ alternate means of nutrition/hydration

## 2021-05-10 NOTE — PROGRESS NOTE ADULT - SUBJECTIVE AND OBJECTIVE BOX
YANETH MERCADO  86y  Male      Patient is a 86y old  Male who presents with a chief complaint of Stroke (10 May 2021 09:40)      INTERVAL HPI/OVERNIGHT EVENTS:      REVIEW OF SYSTEMS:  CONSTITUTIONAL: No fever, weight loss, or fatigue  EYES: No eye pain, visual disturbances, or discharge  ENMT:  No difficulty hearing, tinnitus, vertigo; No sinus or throat pain  NECK: No pain or stiffness  BREASTS: No pain, masses, or nipple discharge  RESPIRATORY: No cough, wheezing, chills or hemoptysis; No shortness of breath  CARDIOVASCULAR: No chest pain, palpitations, dizziness, or leg swelling  GASTROINTESTINAL: No abdominal or epigastric pain. No nausea, vomiting, or hematemesis; No diarrhea or constipation. No melena or hematochezia.  GENITOURINARY: No dysuria, frequency, hematuria, or incontinence  NEUROLOGICAL: No headaches, memory loss, loss of strength, numbness, or tremors  SKIN: No itching, burning, rashes, or lesions   LYMPH NODES: No enlarged glands  ENDOCRINE: No heat or cold intolerance; No hair loss  MUSCULOSKELETAL: No joint pain or swelling; No muscle, back, or extremity pain  PSYCHIATRIC: No depression, anxiety, mood swings, or difficulty sleeping  HEME/LYMPH: No easy bruising, or bleeding gums  ALLERY AND IMMUNOLOGIC: No hives or eczema  FAMILY HISTORY:  FHx: stroke (Sibling)      T(C): 35.9 (05-10-21 @ 05:38), Max: 36.5 (05-09-21 @ 14:01)  HR: 84 (05-10-21 @ 09:58) (84 - 92)  BP: 139/66 (05-10-21 @ 09:58) (139/66 - 159/75)  RR: 18 (05-10-21 @ 05:38) (17 - 18)  SpO2: --  Wt(kg): --Vital Signs Last 24 Hrs  T(C): 35.9 (10 May 2021 05:38), Max: 36.5 (09 May 2021 14:01)  T(F): 96.7 (10 May 2021 05:38), Max: 97.7 (09 May 2021 14:01)  HR: 84 (10 May 2021 09:58) (84 - 92)  BP: 139/66 (10 May 2021 09:58) (139/66 - 159/75)  BP(mean): 95 (10 May 2021 09:58) (95 - 95)  RR: 18 (10 May 2021 05:38) (17 - 18)  SpO2: --  No Known Allergies      PHYSICAL EXAM:  GENERAL: NAD  NERVOUS SYSTEM:  Alert, lethargic, DTRs 2+ intact and symmetric, moving all extremities  CHEST/LUNG: Clear to percussion bilaterally; No rales, rhonchi, wheezing, or rubs  HEART: Regular rate and rhythm; No murmurs, rubs, or gallops  ABDOMEN: Soft, Nontender, Nondistended; Bowel sounds present  EXTREMITIES:  2+ Peripheral Pulses, No clubbing, cyanosis, or edema  LYMPH: No lymphadenopathy noted  SKIN: No rashes or lesions    Consultant(s) Notes Reviewed:  [x ] YES  [ ] NO  Care Discussed with Consultants/Other Providers [ x] YES  [ ] NO    LABS:      RADIOLOGY & ADDITIONAL TESTS:    Imaging Personally Reviewed:  [ ] YES  [ ] NO  amLODIPine   Tablet 10 milliGRAM(s) Oral daily  chlorhexidine 4% Liquid 1 Application(s) Topical <User Schedule>  enoxaparin Injectable 40 milliGRAM(s) SubCutaneous daily  levETIRAcetam  IVPB 500 milliGRAM(s) IV Intermittent every 12 hours  losartan 25 milliGRAM(s) Oral daily  metoprolol tartrate 25 milliGRAM(s) Oral two times a day  pantoprazole   Suspension 40 milliGRAM(s) Oral daily      HEALTH ISSUES - PROBLEM Dx:

## 2021-05-10 NOTE — PROGRESS NOTE ADULT - ASSESSMENT
86M w/  PMHx gout presenting w/ aphasia, R sided weakness; s/p tpa. BL frontal IPH, SAH, c/b midline shift; acute CVA L temporal, R pareital.    #IPH, BL frontal, SAH c/b midline shift  in setting of acute CVA L temporal, R parietal s/p tpa  - CT/ MR Head 5/4 new large bifrontal lobar hemorrhage with hematocrit level, right larger than left, since the prior CT from 5/3/2021.  - Repeat cth 5/5 with stable findings  - Veeg no overt sz  - keppra 500 bid  - no intervention per neurosx  - f/u neuro      #HTN urgency  improved s/p nicardipine gtt  sbp goal 120s - 150s  norvasc 10  lopressor 25 bid    #Gout  controlled off medications    #DVT ppx  lovenox

## 2021-05-10 NOTE — SWALLOW BEDSIDE ASSESSMENT ADULT - SWALLOW EVAL: DIAGNOSIS
mentation negatively impacts swallow function. pt did accept ~2oz of puree and ~2oz of thin liquids however required max cues over the length of 30 min
pt w/o awareness of feeding situation - remains unsafe for PO diet.
Improved awareness of feeding situation; +min overt s/s aspiration/penetration for nectar-thick, thin liquids and puree consistency

## 2021-05-10 NOTE — PROGRESS NOTE ADULT - ASSESSMENT
Assessment:  85 y/o M w/ PMH gout and family history of stroke p/w acute onset aphasia, L gaze preference, R HP, and was in hypertensive emergency. Stroke code called; initial NIHSS 18. Initial CTH w/o pathology. Pt w/in window for tPA (labetalol given for HTN). MRI and repeat CT consistent with bifrontal bilateral hemorrhages (without clinical worsening), which are likely 2/2 to hemorrhagic conversion of infarction after administration of TPA. No NSx intervention at this time, as evacuation will not likely improve the patient's functional neurological prognosis. Stability scan shows stable bleed. vEEG overnight negative for subclinical seizures. PE improving, pt able to follow commands, although patient is very lethargic. moves all extremities and is speaking per granddaughter at bedside. NIH today 11       suggestions:  please transfer patient to stroke unit  obtain repeat head ct   if CTH stable, start patient on ASA 81 mg daily.   obtain repeat EEG   continue keppra   follow up S+S   consider medication for depression if pt continues to appear lethargic and uninterested in exam

## 2021-05-10 NOTE — PROGRESS NOTE ADULT - ASSESSMENT
Acute stroke, s/p TPA, has 2 frontal hematomas which are stable on repeat CT scan - likely hemorrhagic conversion   HTN controlled  gout by history  hypernatremia, water deficit 1.7 L calculated    Plan:  patient transferred to stroke unit for closer monitoring  continue current BP meds  give free water 300 cc every 6 hours would is 1.2 L   monitor serum sodium, may need IV D5W if Na doesn't improve  patient on prophylactic anti-seizure medication  speech and swallow eval  PT and OT eval    Pending:  neuro improvement/speech and swallow  placement - likely will need STR after DC  discussion - with grand-daughter who is a nurse

## 2021-05-10 NOTE — PROGRESS NOTE ADULT - SUBJECTIVE AND OBJECTIVE BOX
patient seen and examined earlier today and again after transfer to stroke unit.  both visits patient opens eyes , can answer in English, but is slow in speech and slow to respond.  Vital Signs Last 24 Hrs  T(C): 35.9 (10 May 2021 05:38), Max: 36.5 (09 May 2021 14:01)  T(F): 96.7 (10 May 2021 05:38), Max: 97.7 (09 May 2021 14:01)  HR: 84 (10 May 2021 09:58) (84 - 92)  BP: 139/66 (10 May 2021 09:58) (139/66 - 159/75)  BP(mean): 95 (10 May 2021 09:58) (95 - 95)  RR: 18 (10 May 2021 05:38) (17 - 18)  conj pink, no jaundice  neck suppel no JVD no carotid bruits  lungs clear to auscultation  heart has regular rhythm, no murmurs heard  abd. soft nontender. BS pos.   extremities - I saw patient move both arms, did not move legs in front of me. no edema. skin turgor poor.                        12.9   8.70  )-----------( 239      ( 10 May 2021 06:20 )             41.4   05-10    157<H>  |  119<H>  |  37<H>  ----------------------------<  116<H>  3.8   |  23  |  0.8    Ca    9.1      10 May 2021 06:20

## 2021-05-10 NOTE — PROGRESS NOTE ADULT - SUBJECTIVE AND OBJECTIVE BOX
Neurology Progress Note    Interval History:  pt downgraded from ICU     HPI:  86 year old right handed gentleman with no significant PMH presented to the ED with his granddaughter. Patient last known well at 11:30 AM. Patient found to be aphasic with right sided weakness at around noon.   Stroke code and code NI called on arrival. Patient with NIHSS 18. CTH without intracranial pathology.  Patient given IV TPA at 13:23. He was hypertensive on presentation () and was given Labetalol 20 mg IVP and started on nicardipine drip (now off) called for MICU  (03 May 2021 16:17)    Ct scan post TPA showed ICH. Pt placed on seizure ppx     PAST MEDICAL & SURGICAL HISTORY:  Gout  not on treatment, no recent flares    No significant past surgical history            Medications:  amLODIPine   Tablet 10 milliGRAM(s) Oral daily  chlorhexidine 4% Liquid 1 Application(s) Topical <User Schedule>  enoxaparin Injectable 40 milliGRAM(s) SubCutaneous daily  levETIRAcetam  IVPB 500 milliGRAM(s) IV Intermittent every 12 hours  losartan 25 milliGRAM(s) Oral daily  metoprolol tartrate 25 milliGRAM(s) Oral two times a day  pantoprazole   Suspension 40 milliGRAM(s) Oral daily      Vital Signs Last 24 Hrs  T(C): 35.9 (10 May 2021 05:38), Max: 36.5 (09 May 2021 14:01)  T(F): 96.7 (10 May 2021 05:38), Max: 97.7 (09 May 2021 14:01)  HR: 92 (10 May 2021 05:38) (92 - 92)  BP: 159/75 (10 May 2021 05:38) (157/70 - 159/75)  RR: 18 (10 May 2021 05:38) (17 - 18)      Neurological Exam:   Mental status: Awake but lethargic. pt able to say "hi doctor" after being told my grandaught in his native language to repeat after her.   Cranial nerves:  no nystagmus, extraocular muscles intact, face symmetric, hearing intact   Motor:   Normal tone and bulk.  No abnormal movements. patient able to move all extremities L> R    Sensation: Intact to noxious stimuli   Reflexes: 2+ in bilateral LE, downgoing toes bilaterally.   Gait: deferred     NIH Stroke Scale:   · NIH Stroke Scale: LOC	(0)   · NIH Stroke Scale: LOC Question	(2)   · NIH Stroke Scale: LOC Command	(2)  · NIH Stroke Scale: Gaze	(0)   · NIH Stroke Scale: Visual	(0)   · NIH Stroke Scale: Facial	(0)   · NIH Stroke Scale: Arm Left	(0)   · NIH Stroke Scale: Arm Right	(1)   · NIH Stroke Scale: Leg Left	(2)   · NIH Stroke Scale: Leg Right	(2)   · NIH Stroke Scale: Ataxia	(0)   · NIH Stroke Scale: Sensory	(1)   · NIH Stroke Scale: Language	(1)   · NIH Stroke Scale: Dysarthria	(0) Normal  · NIH Stroke Scale: Extinct Inattention	(0) No abnormality  · NIH Stroke Scale: Total	11      Labs:  CBC Full  -  ( 10 May 2021 06:20 )  WBC Count : 8.70 K/uL  RBC Count : 5.19 M/uL  Hemoglobin : 12.9 g/dL  Hematocrit : 41.4 %  Platelet Count - Automated : 239 K/uL  Mean Cell Volume : 79.8 fL  Mean Cell Hemoglobin : 24.9 pg  Mean Cell Hemoglobin Concentration : 31.2 g/dL  Auto Neutrophil # : 6.54 K/uL  Auto Lymphocyte # : 1.31 K/uL  Auto Monocyte # : 0.66 K/uL  Auto Eosinophil # : 0.08 K/uL  Auto Basophil # : 0.05 K/uL  Auto Neutrophil % : 75.1 %  Auto Lymphocyte % : 15.1 %  Auto Monocyte % : 7.6 %  Auto Eosinophil % : 0.9 %  Auto Basophil % : 0.6 %    05-10    157<H>  |  119<H>  |  37<H>  ----------------------------<  116<H>  3.8   |  23  |  0.8    Ca    9.1      10 May 2021 06:20      VEEG in the last 24 hours:    Background-----------------low amplitude ,continues reaching frequencies in the range of 5-6 hz  that shows reactivity     Focal and generalized slowing----------------   mild to moderate generalized   slowing. Bilateral independent anterior quadrants focal slowing    Interictal activity------------------ none    Events--------------------- none    Seizures---------------  none    Impression:  abnormal as above      < from: MR Head w/wo IV Cont (05.04.21 @ 14:34) >  IMPRESSION:    New large bifrontal lobar hemorrhage with hematocrit level, right larger than left, since the prior CT from 5/3/2021. Additional smaller focus of hemorrhage in the right occipital lobe. Also noted are multiple scattered punctate susceptibility signals in the bilateral cerebral hemispheres. These constellations of findings are suspicious for lobar hemorrhage associated with amyloid angiopathy. Associated moderate surrounding edema and mass effect, with leftward midline shift measuring 0.4 cm.    Small foci of acute infarct in the left temporal and right parietal lobes.    Linear signal abnormality and enhancement along the right superior cerebral sulcus and precentral sulci could reflect subarachnoid hemorrhage.    < end of copied text >      < from: CT Head No Cont (05.05.21 @ 20:56) >  Since 5/5/2021 12:10 PM:    Again noted multiple stable bilateral lobar hematomas, with associated stable mass effect from the right frontal hematoma with associated 0.4 cmmidline shift to the left.    Stable examination.    < end of copied text >

## 2021-05-10 NOTE — SWALLOW BEDSIDE ASSESSMENT ADULT - SLP PERTINENT HISTORY OF CURRENT PROBLEM
86 year old right handed gentleman with no significant PMH presented to the ED with his granddaughter. Patient last known well at 11:30 AM. Patient found to be aphasic with right sided weakness at around noon. Stroke code and code NI called on arrival. Patient with NIHSS 18; MRI-> New large bifrontal lobar hemorrhage with hematocrit level, right larger than left, since the prior CT from 5/3/2021. Additional smaller focus of hemorrhage in the right occipital lobe. Associated moderate surrounding edema and mass effect, with leftward midline shift measuring 0.4 cm. Small foci of acute infarct in the left temporal and right parietal lobes. RI and repeat CT consistent with bifrontal bilateral hemorrhages (without clinical worsening), which are likely 2' hemorrhagic conversion of infarction after administration of TPA. Repeat CTH stable. SLP reconsulted to assess candidacy for PO diet. 86 year old right handed gentleman with no significant PMH presented to the ED with his granddaughter. Patient last known well at 11:30 AM. Patient found to be aphasic with right sided weakness at around noon. Stroke code and code NI called on arrival. Patient with NIHSS 18; MRI-> New large bifrontal lobar hemorrhage with hematocrit level, right larger than left, since the prior CT from 5/3/2021. Additional smaller focus of hemorrhage in the right occipital lobe. Associated moderate surrounding edema and mass effect, with leftward midline shift measuring 0.4 cm. Small foci of acute infarct in the left temporal and right parietal lobes. MRI and repeat CT findings c/w hemorrhagic conversion of infarction after administration of TPA. Repeat CTH stable. SLP reconsulted to assess candidacy for PO diet.

## 2021-05-10 NOTE — SWALLOW BEDSIDE ASSESSMENT ADULT - NS ASR SWALLOW FINDINGS DISCUS
JENNIFER Fields/Nursing
RN Kayleigh MATA [phone]/Physician/Nursing/Patient/Family
MD Bryant Toscano/Physician/Nursing

## 2021-05-10 NOTE — SWALLOW BEDSIDE ASSESSMENT ADULT - SLP GENERAL OBSERVATIONS
pt with eyes open. Left gaze preference- no visual tracking, no command following, does not respond to any questions. Global aphasia
pt received in bed asleep arousable in no apparent pain. +room air +NGT in place; +global aphasia
pt received in bed asleep arousable in no apparent pain. +NGT; +granddaughter Shantel present at bedside who reports pt making requests and w/ increased communication. pt able to state name and hospital when asked orientation questions.

## 2021-05-11 LAB
ALBUMIN SERPL ELPH-MCNC: 3.8 G/DL — SIGNIFICANT CHANGE UP (ref 3.5–5.2)
ALP SERPL-CCNC: 98 U/L — SIGNIFICANT CHANGE UP (ref 30–115)
ALT FLD-CCNC: 32 U/L — SIGNIFICANT CHANGE UP (ref 0–41)
ANION GAP SERPL CALC-SCNC: 10 MMOL/L — SIGNIFICANT CHANGE UP (ref 7–14)
APTT BLD: 30.3 SEC — SIGNIFICANT CHANGE UP (ref 27–39.2)
AST SERPL-CCNC: 35 U/L — SIGNIFICANT CHANGE UP (ref 0–41)
BASOPHILS # BLD AUTO: 0.04 K/UL — SIGNIFICANT CHANGE UP (ref 0–0.2)
BASOPHILS NFR BLD AUTO: 0.5 % — SIGNIFICANT CHANGE UP (ref 0–1)
BILIRUB SERPL-MCNC: 0.5 MG/DL — SIGNIFICANT CHANGE UP (ref 0.2–1.2)
BLD GP AB SCN SERPL QL: SIGNIFICANT CHANGE UP
BUN SERPL-MCNC: 39 MG/DL — HIGH (ref 10–20)
CALCIUM SERPL-MCNC: 8.9 MG/DL — SIGNIFICANT CHANGE UP (ref 8.5–10.1)
CHLORIDE SERPL-SCNC: 115 MMOL/L — HIGH (ref 98–110)
CO2 SERPL-SCNC: 28 MMOL/L — SIGNIFICANT CHANGE UP (ref 17–32)
CREAT SERPL-MCNC: 0.8 MG/DL — SIGNIFICANT CHANGE UP (ref 0.7–1.5)
EOSINOPHIL # BLD AUTO: 0.06 K/UL — SIGNIFICANT CHANGE UP (ref 0–0.7)
EOSINOPHIL NFR BLD AUTO: 0.7 % — SIGNIFICANT CHANGE UP (ref 0–8)
GLUCOSE BLDC GLUCOMTR-MCNC: 104 MG/DL — HIGH (ref 70–99)
GLUCOSE BLDC GLUCOMTR-MCNC: 108 MG/DL — HIGH (ref 70–99)
GLUCOSE BLDC GLUCOMTR-MCNC: 142 MG/DL — HIGH (ref 70–99)
GLUCOSE BLDC GLUCOMTR-MCNC: 89 MG/DL — SIGNIFICANT CHANGE UP (ref 70–99)
GLUCOSE SERPL-MCNC: 212 MG/DL — HIGH (ref 70–99)
HCT VFR BLD CALC: 42.1 % — SIGNIFICANT CHANGE UP (ref 42–52)
HGB BLD-MCNC: 12.4 G/DL — LOW (ref 14–18)
IMM GRANULOCYTES NFR BLD AUTO: 0.7 % — HIGH (ref 0.1–0.3)
INR BLD: 1.1 RATIO — SIGNIFICANT CHANGE UP (ref 0.65–1.3)
LYMPHOCYTES # BLD AUTO: 1.44 K/UL — SIGNIFICANT CHANGE UP (ref 1.2–3.4)
LYMPHOCYTES # BLD AUTO: 16.3 % — LOW (ref 20.5–51.1)
MAGNESIUM SERPL-MCNC: 2.7 MG/DL — HIGH (ref 1.8–2.4)
MCHC RBC-ENTMCNC: 24.2 PG — LOW (ref 27–31)
MCHC RBC-ENTMCNC: 29.5 G/DL — LOW (ref 32–37)
MCV RBC AUTO: 82.1 FL — SIGNIFICANT CHANGE UP (ref 80–94)
MONOCYTES # BLD AUTO: 0.54 K/UL — SIGNIFICANT CHANGE UP (ref 0.1–0.6)
MONOCYTES NFR BLD AUTO: 6.1 % — SIGNIFICANT CHANGE UP (ref 1.7–9.3)
NEUTROPHILS # BLD AUTO: 6.68 K/UL — HIGH (ref 1.4–6.5)
NEUTROPHILS NFR BLD AUTO: 75.7 % — HIGH (ref 42.2–75.2)
NRBC # BLD: 0 /100 WBCS — SIGNIFICANT CHANGE UP (ref 0–0)
PLATELET # BLD AUTO: 220 K/UL — SIGNIFICANT CHANGE UP (ref 130–400)
POTASSIUM SERPL-MCNC: 3.7 MMOL/L — SIGNIFICANT CHANGE UP (ref 3.5–5)
POTASSIUM SERPL-SCNC: 3.7 MMOL/L — SIGNIFICANT CHANGE UP (ref 3.5–5)
PROT SERPL-MCNC: 6.7 G/DL — SIGNIFICANT CHANGE UP (ref 6–8)
PROTHROM AB SERPL-ACNC: 12.7 SEC — SIGNIFICANT CHANGE UP (ref 9.95–12.87)
RBC # BLD: 5.13 M/UL — SIGNIFICANT CHANGE UP (ref 4.7–6.1)
RBC # FLD: 15.4 % — HIGH (ref 11.5–14.5)
SODIUM SERPL-SCNC: 153 MMOL/L — HIGH (ref 135–146)
WBC # BLD: 8.82 K/UL — SIGNIFICANT CHANGE UP (ref 4.8–10.8)
WBC # FLD AUTO: 8.82 K/UL — SIGNIFICANT CHANGE UP (ref 4.8–10.8)

## 2021-05-11 PROCEDURE — 93970 EXTREMITY STUDY: CPT | Mod: 26

## 2021-05-11 PROCEDURE — 71045 X-RAY EXAM CHEST 1 VIEW: CPT | Mod: 26

## 2021-05-11 PROCEDURE — 99231 SBSQ HOSP IP/OBS SF/LOW 25: CPT

## 2021-05-11 RX ORDER — ASPIRIN/CALCIUM CARB/MAGNESIUM 324 MG
81 TABLET ORAL DAILY
Refills: 0 | Status: DISCONTINUED | OUTPATIENT
Start: 2021-05-11 | End: 2021-05-12

## 2021-05-11 RX ORDER — LABETALOL HCL 100 MG
10 TABLET ORAL ONCE
Refills: 0 | Status: COMPLETED | OUTPATIENT
Start: 2021-05-11 | End: 2021-05-11

## 2021-05-11 RX ORDER — FLUOXETINE HCL 10 MG
20 CAPSULE ORAL DAILY
Refills: 0 | Status: DISCONTINUED | OUTPATIENT
Start: 2021-05-11 | End: 2021-05-12

## 2021-05-11 RX ADMIN — LEVETIRACETAM 420 MILLIGRAM(S): 250 TABLET, FILM COATED ORAL at 17:17

## 2021-05-11 RX ADMIN — PANTOPRAZOLE SODIUM 40 MILLIGRAM(S): 20 TABLET, DELAYED RELEASE ORAL at 11:58

## 2021-05-11 RX ADMIN — AMLODIPINE BESYLATE 10 MILLIGRAM(S): 2.5 TABLET ORAL at 05:48

## 2021-05-11 RX ADMIN — LOSARTAN POTASSIUM 25 MILLIGRAM(S): 100 TABLET, FILM COATED ORAL at 08:30

## 2021-05-11 RX ADMIN — Medication 25 MILLIGRAM(S): at 17:18

## 2021-05-11 RX ADMIN — ENOXAPARIN SODIUM 40 MILLIGRAM(S): 100 INJECTION SUBCUTANEOUS at 05:49

## 2021-05-11 RX ADMIN — CHLORHEXIDINE GLUCONATE 1 APPLICATION(S): 213 SOLUTION TOPICAL at 05:48

## 2021-05-11 RX ADMIN — Medication 81 MILLIGRAM(S): at 11:59

## 2021-05-11 RX ADMIN — Medication 25 MILLIGRAM(S): at 05:48

## 2021-05-11 RX ADMIN — Medication 20 MILLIGRAM(S): at 11:59

## 2021-05-11 RX ADMIN — LEVETIRACETAM 420 MILLIGRAM(S): 250 TABLET, FILM COATED ORAL at 05:48

## 2021-05-11 NOTE — OCCUPATIONAL THERAPY INITIAL EVALUATION ADULT - LEVEL OF INDEPENDENCE: TOILET, REHAB EVAL
Abdomen soft, non-tender and non-distended, no rebound, no guarding and no masses. no hepatosplenomegaly. moderate assist (50% patients effort)

## 2021-05-11 NOTE — PHYSICAL THERAPY INITIAL EVALUATION ADULT - PERTINENT HX OF CURRENT PROBLEM, REHAB EVAL
pt is a 86 year old right handed gentleman with no significant PMH presented to the ED with his granddaughter. Patient last known well at 11:30 AM. Patient found to be aphasic with right sided weakness at around noon.
ischemic CVA with hemorrhagic transformation

## 2021-05-11 NOTE — OCCUPATIONAL THERAPY INITIAL EVALUATION ADULT - GENERAL OBSERVATIONS, REHAB EVAL
OT eval: 10:34-10:20 Pt received semi gallego in bed in NAD +NG tube, pulse ox monitor, cardiac monitor, BLE sequentials. Pt granddaughter present and requested to translate as needed 2* pt granddaughter verbalized  phone does not have Gujarati language. Pt understands English as well.

## 2021-05-11 NOTE — OCCUPATIONAL THERAPY INITIAL EVALUATION ADULT - TRANSFER TRAINING, PT EVAL
Pt will transfer from bed to chair and toilet with mod assist by discharge to work towards returning to PLOF.

## 2021-05-11 NOTE — OCCUPATIONAL THERAPY INITIAL EVALUATION ADULT - COGNITIVE, VISUAL PERCEPTUAL, OT EVAL
Pt will attend to a functional task for 5 min with no more than 2 verbal cues to sustain attention by discharge.

## 2021-05-11 NOTE — CONSULT NOTE ADULT - SUBJECTIVE AND OBJECTIVE BOX
HPI:  86 year old right handed gentleman with no significant PMH presented to the ED with his granddaughter. Patient last known well at 11:30 AM. Patient found to be aphasic with right sided weakness at around noon.   Stroke code and code NI called on arrival. Patient with NIHSS 18. CTH without intracranial pathology.  Patient given IV TPA at 13:23. He was hypertensive on presentation () and was given Labetalol 20 mg IVP and started on nicardipine drip (now off) called for MICU  (03 May 2021 16:17)      PAST MEDICAL & SURGICAL HISTORY:  Gout  not on treatment, no recent flares    No significant past surgical history        Hospital Course:  Stroke code and code NI called on arrival. Patient with NIHSS 18. CTH without intracranial pathology.  Patient given IV TPA at 13:23. He was hypertensive on presentation () and was given Labetalol 20 mg IVP and started on nicardipine drip (now off) called for MICU  (03 May 2021 16:17)    Ct scan post TPA showed ICH. Pt placed on seizure ppx  MRI and repeat CT consistent with bifrontal bilateral hemorrhages (without clinical worsening), which are likely 2/2 to hemorrhagic conversion of infarction after administration of TPA. No NSx intervention at this time, as evacuation will not likely improve the patient's functional neurological prognosis. Stability scan shows stable bleed. vEEG overnight negative for subclinical seizures. PE improving, pt able to follow commands, although patient is very lethargic. moves all extremities and is speaking per granddaughter at bedside. NIH today 11  He was seen by ST and made NPO, fed with NGT.    TODAY'S SUBJECTIVE & REVIEW OF SYMPTOMS: Seen with granddaughter. Patient minimally communicative. Has been c/o rle pain. No other c/o.      Constitutional WNL   Cardio WNL   Resp WNL   GI WNL  Heme WNL  Endo WNL  Skin WNL  MSK WNL  Neuro as above. o/w neg  Cognitive WNL  Psych WNL      MEDICATIONS  (STANDING):  amLODIPine   Tablet 10 milliGRAM(s) Oral daily  aspirin enteric coated 81 milliGRAM(s) Oral daily  chlorhexidine 4% Liquid 1 Application(s) Topical <User Schedule>  enoxaparin Injectable 40 milliGRAM(s) SubCutaneous daily  FLUoxetine 20 milliGRAM(s) Oral daily  levETIRAcetam  IVPB 500 milliGRAM(s) IV Intermittent every 12 hours  losartan 25 milliGRAM(s) Oral daily  metoprolol tartrate 25 milliGRAM(s) Oral two times a day  pantoprazole   Suspension 40 milliGRAM(s) Oral daily    MEDICATIONS  (PRN):      FAMILY HISTORY:  FHx: stroke (Sibling)        Allergies    No Known Allergies    Intolerances        SOCIAL HISTORY:    [  ] Etoh  [  ] Smoking  [  ] Substance abuse     Home Environment:  [  ] Home Alone  [x  ] Lives with Family  [  ] Home Health Aid    Dwelling:  [  ] Apartment  [ x ] Private House  [  ] Adult Home  [  ] Skilled Nursing Facility      [  ] Short Term  [  ] Long Term  [x  ] Stairs       Elevator [  ]    FUNCTIONAL STATUS PTA: (Check all that apply)  Ambulation: [  x ]Independent   [   ] Requires Assistance   [  ] Dependent     [  ] Non-Ambulatory       Assistive Device: [  ] SA Cane  [  ]  Q Cane  [  ] Walker  [  ]  Wheelchair  ADL : [x  ] Independent    [   ] Requires Assistance    [  ]  Dependent       Vital Signs Last 24 Hrs  T(C): 36.4 (11 May 2021 08:46), Max: 36.7 (10 May 2021 21:06)  T(F): 97.6 (11 May 2021 08:46), Max: 98.1 (10 May 2021 21:06)  HR: 90 (11 May 2021 08:46) (86 - 94)  BP: 137/77 (11 May 2021 08:46) (124/69 - 151/78)  BP(mean): 92 (11 May 2021 05:43) (80 - 101)  RR: 18 (11 May 2021 08:46) (17 - 20)  SpO2: 97% (11 May 2021 08:46) (97% - 99%)      PHYSICAL EXAM: Severe psychomotor slowing. Poor tracking and eye contact. Severe bradykinesia. NGT  GENERAL: NAD, well-groomed, well-developed. Thin  HEAD:  Atraumatic, Normocephalic  EYES: Poor tracking and fixing. Left gaze preference, PERRL  NECK: Supple  CHEST/LUNG: Clear to auscultation  HEART: Regular rate and rhythm  ABDOMEN: Soft, Nontender, Nondistended  EXTREMITIES:  No clubbing, cyanosis, or edema  PROM:  [ x  ] WFL all extremities  [  ] Abnormal    NERVOUS SYSTEM: Follows simple commands with long delay. Grossly oriented x 2, not year  Cranial Nerves 2-12: [   ] intact  [ x ] Abnormal unable to track to right past midline. Identifies fingers to both sides  Motor Strength: [   ] WFL all extremities   [ x ] Abnormal - Difficult to eval. Moves all extremities against gravity, left more than right  Sensation: [ ?  ] intact to light touch  [  ] Abnormal   Reflexes: [ x  ] Symmetric  [  ]  Abnormal       FUNCTIONAL STATUS:  Bed Mobility: [   ]Independent  [  ] Supervision  [ x ] Needs Assistance   [  ] N/A   Transfers: [   ] Independent  [  ] Supervision  [x  ] Needs Assistance  [  ]  N/A   Ambulation: [   ] Independent  [  ] Supervision  [ x ] Needs Assistance  [  ] N/A   ADL: [   ] Independent  [x  ] Requires Assistance  [  ] N/A       LABS:                        12.4   8.82  )-----------( 220      ( 11 May 2021 05:43 )             42.1     05-11    153<H>  |  115<H>  |  39<H>  ----------------------------<  212<H>  3.7   |  28  |  0.8    Ca    8.9      11 May 2021 05:43  Mg     2.7     05-11    TPro  6.7  /  Alb  3.8  /  TBili  0.5  /  DBili  x   /  AST  35  /  ALT  32  /  AlkPhos  98  05-11          RADIOLOGY & ADDITIONAL STUDIES:            Assesment:

## 2021-05-11 NOTE — CONSULT NOTE ADULT - SUBJECTIVE AND OBJECTIVE BOX
VASCULAR SURGERY CONSULT NOTE      HPI:  86 year old right handed gentleman with no significant PMH presented to the ED with his granddaughter. Patient last known well at 11:30 AM. Patient found to be aphasic with right sided weakness at around noon.   Stroke code and code NI called on arrival. Patient with NIHSS 18. CTH without intracranial pathology.  Patient given IV TPA at 13:23. He was hypertensive on presentation () and was given Labetalol 20 mg IVP and started on nicardipine drip (now off) called for MICU  (03 May 2021 16:17)        PAST MEDICAL & SURGICAL HISTORY:  Gout  not on treatment, no recent flares    No significant past surgical history      No Known Allergies    Home Medications:    No permtinent family history of PVD    REVIEW OF SYSTEMS:  GENERAL:                                         negative  SKIN:                                                 negative  OPTHALMOLOGIC:                          negative  ENMT:                                               negative  RESPIRATORY AND THORAX:        negative  CARDIOVASCULAR:                   see HPI  GASTROINTESTINAL:                       negative  NEPHROLOGY:                                  negative  MUSCULOSKELETAL:                       negative  NEUROLOGIC:                                   see HPI  PSYCHIATRIC:                                    negative  HEMATOLOGY/LYMPHATICS:         negative  ENDOCRINE:                                     negative  ALLERGIC/IMMUNOLOGIC:            negative    12 point ROS otherwise normal except as stated in HPI    PHYSICAL EXAM  Vital Signs Last 24 Hrs  T(C): 36.4 (11 May 2021 08:46), Max: 36.7 (10 May 2021 21:06)  T(F): 97.6 (11 May 2021 08:46), Max: 98.1 (10 May 2021 21:06)  HR: 90 (11 May 2021 08:46) (88 - 94)  BP: 137/77 (11 May 2021 08:46) (137/77 - 151/78)  BP(mean): 92 (11 May 2021 05:43) (80 - 101)  RR: 18 (11 May 2021 08:46) (17 - 19)  SpO2: 97% (11 May 2021 08:46) (97% - 99%)    Appearance: Normal	  HEENT:   Normal oral mucosa, PERRL, EOMI	  Neck: Supple, - JVD;  Cardiovascular: Normal S1 S2, No JVD, No murmurs,   Respiratory: Lungs clear to auscultation, No Rales, Rhonchi, Wheezing	  Gastrointestinal:  Soft, Non-tender, positive BS	  Skin: No rashes, No ecchymoses, No cyanosis  Extremities: Normal range of motion, No clubbing, cyanosis or edema  Neurologic: Non-focal  Psychiatry: A & O x 3, Mood & affect appropriate          MEDICATIONS:   MEDICATIONS  (STANDING):  amLODIPine   Tablet 10 milliGRAM(s) Oral daily  aspirin enteric coated 81 milliGRAM(s) Oral daily  chlorhexidine 4% Liquid 1 Application(s) Topical <User Schedule>  enoxaparin Injectable 40 milliGRAM(s) SubCutaneous daily  FLUoxetine 20 milliGRAM(s) Oral daily  levETIRAcetam  IVPB 500 milliGRAM(s) IV Intermittent every 12 hours  losartan 25 milliGRAM(s) Oral daily  metoprolol tartrate 25 milliGRAM(s) Oral two times a day  pantoprazole   Suspension 40 milliGRAM(s) Oral daily    MEDICATIONS  (PRN):      LAB/STUDIES:                        12.4   8.82  )-----------( 220      ( 11 May 2021 05:43 )             42.1     05-11    153<H>  |  115<H>  |  39<H>  ----------------------------<  212<H>  3.7   |  28  |  0.8    Ca    8.9      11 May 2021 05:43  Mg     2.7     05-11    TPro  6.7  /  Alb  3.8  /  TBili  0.5  /  DBili  x   /  AST  35  /  ALT  32  /  AlkPhos  98  05-11      LIVER FUNCTIONS - ( 11 May 2021 05:43 )  Alb: 3.8 g/dL / Pro: 6.7 g/dL / ALK PHOS: 98 U/L / ALT: 32 U/L / AST: 35 U/L / GGT: x                   IMAGING:    < from: VA Duplex Lower Ext Vein Scan, Bilat (05.11.21 @ 11:57) >  Right leg free from thrombus  Left left DVT in the external iliac, common femoral veins    < end of copied text >  < from: CT Head No Cont (05.10.21 @ 17:42) >  The appearance of the bilateral intraparenchymal hemorrhages and regions of subarachnoid hemorrhage continues to evolve and become more subacute. There is associated stable mass effect from the right frontal hematoma with associated 4 mm midline shift to the left.

## 2021-05-11 NOTE — PHYSICAL THERAPY INITIAL EVALUATION ADULT - BED MOBILITY TRAINING, PT EVAL
will participate in supine <> sit bed mobility with mod/max A by d/c
by discharge: supine <> sit to contact guard

## 2021-05-11 NOTE — PHYSICAL THERAPY INITIAL EVALUATION ADULT - ACTIVE RANGE OF MOTION EXAMINATION, REHAB EVAL
LLE/ LLE WFL, RLE: hip flexion 1/2 AROM, R knee extension WFL, R ankle unable to formally assess 2* to fatigue LLE grossly  WFL, RLE: hip flexion 1/2 AROM, R knee extension WFL, R ankle unable to formally assess 2* to fatigue

## 2021-05-11 NOTE — OCCUPATIONAL THERAPY INITIAL EVALUATION ADULT - LIVES WITH, PROFILE
Pt and spouse reside in [place] 6 months out of the year and spend ~2 months with each child in the [place]spouse

## 2021-05-11 NOTE — OCCUPATIONAL THERAPY INITIAL EVALUATION ADULT - ADDITIONAL COMMENTS
Pt granddaughter reports that pt has a first floor bedroom and private bath with bathtub shower in each of his children's homes when he stays with them.

## 2021-05-11 NOTE — OCCUPATIONAL THERAPY INITIAL EVALUATION ADULT - ADL RETRAINING, OT EVAL
Pt will perform UB dressing with no more than max assist by discharge to work towards returning to PLOF.

## 2021-05-11 NOTE — OCCUPATIONAL THERAPY INITIAL EVALUATION ADULT - RANGE OF MOTION EXAMINATION, UPPER EXTREMITY
no AROM observed during OT eval yet 1/4 shoulder flexion observed during Stroke rounds./Left UE Active ROM was WNL (within normal limits)/Right UE Passive ROM was WNL (within normal limits)

## 2021-05-11 NOTE — OCCUPATIONAL THERAPY INITIAL EVALUATION ADULT - PLANNED THERAPY INTERVENTIONS, OT EVAL
ADL retraining/balance training/bed mobility training/cognitive, visual perceptual/fine motor coordination training/motor coordination training/neuromuscular re-education/parent/caregiver training.../ROM/strengthening/transfer training

## 2021-05-11 NOTE — CONSULT NOTE ADULT - ASSESSMENT
86M w/  PMHx gout presenting w/ aphasia, R sided weakness; s/p tpa. BL frontal IPH, SAH, c/b midline shift; acute CVA L temporal, R pareital.    #IPH, BL frontal, SAH c/b midline shift  in setting of acute CVA L temporal, R parietal s/p tpa    Vascular Surgery called to evaluate whether pt is a candidate for IVC filter since he was diagnosed with Left ext iliac and common femoral vein DVT since pt cannot receive any AC at this time    For insertion of IVC filter Wed  Keep NPO post MN  IVF when NPO  f/u coags and T&S    Spoke with pt's grand daughter who is by his side, and who called her father (HCP) to discuss procedure for consent, obtained over the phone      SPECTRA [id number]
Impression:  86 year old right handed gentleman presents to the ED with his granddaughter. Patient last known well at 1130. Patient found to be aphasic with right sided weakness at 12.   stroke code and code NI called on arrival. Patient with NIHSS 18. CTH without intracranial pathology. Risks and benfits of IV tpa given to granddaughter and she agreed to accept it. Patient given IV TPA. On CTA no large vessel occlusion. Therefore not a candidate for IA intervention. Etiology of symptoms unknown in addition to ischemic stroke workup, epileptiform cause of symptoms should be investigated.     Suggestion:  Follow institutional post IV TPA protocol.   MRI brain without luis alberto.   TTE  LDL A1C  Video EEG  Seizure precautions.   Keep magnesium >2  Q1 neuro checks.     Ernesto Arguelles NP  [phone]
IMP:  - HTN  - hemorrhagic stroke  - hyponatremia  - altered mental status     - to be kept NPO     - estimated REE 1008 by PSE and 1093 by MSJ eqn  - once NG position verified, start enteral feeds as follows  - Jevity 1.2 240 ml to infuse over 30 minutes - order it q6h     - this --> 53 gm protein & 1140 kcal/d     - add Prosource TF one a day to increase protein to 64 gm/d  - obtain BMP, phos, Mg in am and expect pt to need phos    
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    PRECAUTIONS: [  ] Cardiac  [  ] Respiratory  [  ] Seizures [  ] Contact Isolation  [  ] Droplet Isolation  [  ] Other    Weight Bearing Status: wbat    RECOMMENDATION:    Out of Bed to Chair     DVT/Decubiti Prophylaxis    REHAB PLAN:     [ x  ] Bedside P/T 3-5 times a week   [ x  ]   Bedside O/T  2-3 times a week             [   ] No Rehab Therapy Indicated                   [ x  ]  Speech Therapy   Conditioning/ROM                                    ADL  Bed Mobility                                               Conditioning/ROM  Transfers                                                     Bed Mobility  Sitting /Standing Balance                         Transfers                                        Gait Training                                               Sitting/Standing Balance  Stair Training [   ]Applicable                    Home equipment Eval                                                                        Splinting  [   ] Only      GOALS:   ADL   [   ]   Independent                    Transfers  [   ] Independent                          Ambulation  [   ] Independent     [    ] With device                            [   ]  CG                                                         [   ]  CG                                                                  [   ] CG                            [  x  ] Min A                                                   [  x ] Min A                                                              [ x  ] Min  A          DISCHARGE PLAN:   [ x  ]  Good candidate for Intensive Rehabilitation/Hospital based-4A [facility]                                             Will tolerate 3hrs Intensive Rehab Daily                                       [    ]  Short Term Rehab in Skilled Nursing Facility                                       [    ]  Home with Outpatient or VN services                                         [    ]  Possible Candidate for Intensive Hospital based Rehab

## 2021-05-11 NOTE — PROGRESS NOTE ADULT - ASSESSMENT
86M w/  PMHx gout presenting w/ aphasia, R sided weakness; s/p tpa. BL frontal IPH, SAH, c/b midline shift; acute CVA L temporal, R pareital.    #IPH, BL frontal, SAH c/b midline shift  in setting of acute CVA L temporal, R parietal s/p tpa  - CT/ MR Head 5/4 new large bifrontal lobar hemorrhage with hematocrit level, right larger than left, since the prior CT from 5/3/2021.  - Repeat cth 5/5 with stable findings  - Veeg no overt sz  - keppra 500 bid  - no intervention per neurosx  - f/u neuro    #HTN urgency  improved s/p nicardipine gtt  sbp goal 120s - 150s  norvasc 10  lopressor 25 bid    #Hypernatremia/ improving  - Na 159-->157-->155-->153  - C/w free water 250cc q6hrs    #RLE tenderness  - VA venous duplex ordered to r/o DVT    #Gout  controlled off medications    #DVT ppx  lovenox       86M w/  PMHx gout presenting w/ aphasia, R sided weakness; s/p tpa. BL frontal IPH, SAH, c/b midline shift; acute CVA L temporal, R pareital.    #IPH, BL frontal, SAH c/b midline shift  in setting of acute CVA L temporal, R parietal s/p tpa  - CT/ MR Head 5/4 new large bifrontal lobar hemorrhage with hematocrit level, right larger than left, since the prior CT from 5/3/2021.  - Repeat cth 5/11 with stable findings  - Veeg no overt sz  - keppra 500 bid  - no intervention per neurosx      #HTN urgency  improved s/p nicardipine gtt  sbp goal 120s - 150s  norvasc 10  lopressor 25 bid    #Hypernatremia/ improving  - Na 159-->157-->155-->153  - C/w free water 250cc q6hrs    #RLE tenderness  - VA venous duplex ordered to r/o DVT  - US showed DVT. Given the presence of amyloid angiopathy on MRI and recent bilateral IPH recommend against the AC  - Plan for IVC filter.     #Gout  controlled off medications    #DVT ppx  lovenox

## 2021-05-11 NOTE — PHYSICAL THERAPY INITIAL EVALUATION ADULT - LIVES WITH, PROFILE
not able to assess due to pt's lethargy
Pt lives with spouse, daughter reports spends 6 months here and 6 months in [place] at different family members homes. Bedrooms are all located on 1st floor and have a few steps to enter.

## 2021-05-11 NOTE — PHYSICAL THERAPY INITIAL EVALUATION ADULT - GENERAL OBSERVATIONS, REHAB EVAL
10:40-11:20 Pt encountered semifowler in bed in NAD. Daughter at bedside, able to translate Gujarati, reports does speak some English. Pt with flat affect, + NGT tube, + tele.

## 2021-05-11 NOTE — PHYSICAL THERAPY INITIAL EVALUATION ADULT - TRANSFER TRAINING, PT EVAL
will participate in sit <> stand with max A by d/c
by discharge: sit <> stand to min assist x 1  with RW

## 2021-05-11 NOTE — PHYSICAL THERAPY INITIAL EVALUATION ADULT - DIAGNOSIS, PT EVAL
rehab of  bifrontal bilateral hemorrhages (without clinical worsening), which are likely 2/2 to hemorrhagic conversion of infarction after administration of TPA

## 2021-05-11 NOTE — PHYSICAL THERAPY INITIAL EVALUATION ADULT - MANUAL MUSCLE TESTING RESULTS, REHAB EVAL
LLE grossly WFL, RLE not formally assessed 2* to fatigue but based on function: R hip flexion 3-/5, R knee 3+/5, R ankle n/a observed but no foot drop present. Will f/u for formal assessment

## 2021-05-11 NOTE — OCCUPATIONAL THERAPY INITIAL EVALUATION ADULT - PERSONAL SAFETY AND JUDGMENT, REHAB EVAL
Pt presented with fair judgement during transfer yet pt requires constant supervision not to displace NG tube.

## 2021-05-11 NOTE — PROGRESS NOTE ADULT - SUBJECTIVE AND OBJECTIVE BOX
YANETH MERCADO  86y  Male      Patient is a 86y old  Male who presents with a chief complaint of Stroke (10 May 2021 09:40)      INTERVAL HPI/OVERNIGHT EVENTS: No events overnight      REVIEW OF SYSTEMS:  CONSTITUTIONAL: No fever, weight loss, or fatigue  EYES: No eye pain, visual disturbances, or discharge  ENMT:  No difficulty hearing, tinnitus, vertigo; No sinus or throat pain  NECK: No pain or stiffness  BREASTS: No pain, masses, or nipple discharge  RESPIRATORY: No cough, wheezing, chills or hemoptysis; No shortness of breath  CARDIOVASCULAR: No chest pain, palpitations, dizziness, or leg swelling  GASTROINTESTINAL: No abdominal or epigastric pain. No nausea, vomiting, or hematemesis; No diarrhea or constipation. No melena or hematochezia.  GENITOURINARY: No dysuria, frequency, hematuria, or incontinence  NEUROLOGICAL: No headaches, memory loss, loss of strength, numbness, or tremors  SKIN: No itching, burning, rashes, or lesions   LYMPH NODES: No enlarged glands  ENDOCRINE: No heat or cold intolerance; No hair loss  MUSCULOSKELETAL: No joint pain or swelling; No muscle, back, or extremity pain  PSYCHIATRIC: No depression, anxiety, mood swings, or difficulty sleeping  HEME/LYMPH: No easy bruising, or bleeding gums  ALLERY AND IMMUNOLOGIC: No hives or eczema  FAMILY HISTORY:  FHx: stroke (Sibling)        ICU Vital Signs Last 24 Hrs  T(C): 36.4 (11 May 2021 08:46), Max: 36.7 (10 May 2021 21:06)  T(F): 97.6 (11 May 2021 08:46), Max: 98.1 (10 May 2021 21:06)  HR: 90 (11 May 2021 08:46) (86 - 94)  BP: 137/77 (11 May 2021 08:46) (124/69 - 151/78)  BP(mean): 92 (11 May 2021 05:43) (80 - 101)  RR: 18 (11 May 2021 08:46) (17 - 20)  SpO2: 97% (11 May 2021 08:46) (97% - 99%)        PHYSICAL EXAM:  GENERAL: NAD  NERVOUS SYSTEM:  Alert, lethargic, DTRs 2+ intact and symmetric, moving all extremities  CHEST/LUNG: Clear to percussion bilaterally; No rales, rhonchi, wheezing, or rubs  HEART: Regular rate and rhythm; No murmurs, rubs, or gallops  ABDOMEN: Soft, Nontender, Nondistended; Bowel sounds present  EXTREMITIES:  2+ Peripheral Pulses, No clubbing, cyanosis, or edema, +Homans test RT  LYMPH: No lymphadenopathy noted  SKIN: No rashes or lesions    Consultant(s) Notes Reviewed:  [x ] YES  [ ] NO  Care Discussed with Consultants/Other Providers [ x] YES  [ ] NO    LABS:      RADIOLOGY & ADDITIONAL TESTS:    Imaging Personally Reviewed:  [ ] YES  [ ] NO  amLODIPine   Tablet 10 milliGRAM(s) Oral daily  chlorhexidine 4% Liquid 1 Application(s) Topical <User Schedule>  enoxaparin Injectable 40 milliGRAM(s) SubCutaneous daily  levETIRAcetam  IVPB 500 milliGRAM(s) IV Intermittent every 12 hours  losartan 25 milliGRAM(s) Oral daily  metoprolol tartrate 25 milliGRAM(s) Oral two times a day  pantoprazole   Suspension 40 milliGRAM(s) Oral daily         YANETH MERCADO  86y  Male      Patient is a 86y old  Male who presents with a chief complaint of Stroke (10 May 2021 09:40)      INTERVAL HPI/OVERNIGHT EVENTS: No events overnight      FAMILY HISTORY:  FHx: stroke (Sibling)        ICU Vital Signs Last 24 Hrs  T(C): 36.4 (11 May 2021 08:46), Max: 36.7 (10 May 2021 21:06)  T(F): 97.6 (11 May 2021 08:46), Max: 98.1 (10 May 2021 21:06)  HR: 90 (11 May 2021 08:46) (86 - 94)  BP: 137/77 (11 May 2021 08:46) (124/69 - 151/78)  BP(mean): 92 (11 May 2021 05:43) (80 - 101)  RR: 18 (11 May 2021 08:46) (17 - 20)  SpO2: 97% (11 May 2021 08:46) (97% - 99%)        PHYSICAL EXAM:  GENERAL: NAD  NERVOUS SYSTEM:  Alert, lethargic, DTRs 2+ intact and symmetric, moving all extremities. Able to hold his arm against gravity. Mild right leg weakness.   CHEST/LUNG: Clear to percussion bilaterally; No rales, rhonchi, wheezing, or rubs  HEART: Regular rate and rhythm; No murmurs, rubs, or gallops  ABDOMEN: Soft, Nontender, Nondistended; Bowel sounds present  EXTREMITIES:  2+ Peripheral Pulses, No clubbing, cyanosis, or edema, +Homans test RT  LYMPH: No lymphadenopathy noted  SKIN: No rashes or lesions    Consultant(s) Notes Reviewed:  [x ] YES  [ ] NO  Care Discussed with Consultants/Other Providers [ x] YES  [ ] NO    LABS:      RADIOLOGY & ADDITIONAL TESTS:    Imaging Personally Reviewed:  [ ] YES  [ ] NO  amLODIPine   Tablet 10 milliGRAM(s) Oral daily  chlorhexidine 4% Liquid 1 Application(s) Topical <User Schedule>  enoxaparin Injectable 40 milliGRAM(s) SubCutaneous daily  levETIRAcetam  IVPB 500 milliGRAM(s) IV Intermittent every 12 hours  losartan 25 milliGRAM(s) Oral daily  metoprolol tartrate 25 milliGRAM(s) Oral two times a day  pantoprazole   Suspension 40 milliGRAM(s) Oral daily

## 2021-05-12 DIAGNOSIS — R41.0 DISORIENTATION, UNSPECIFIED: ICD-10-CM

## 2021-05-12 LAB
ALBUMIN SERPL ELPH-MCNC: 3.6 G/DL — SIGNIFICANT CHANGE UP (ref 3.5–5.2)
ALP SERPL-CCNC: 94 U/L — SIGNIFICANT CHANGE UP (ref 30–115)
ALT FLD-CCNC: 27 U/L — SIGNIFICANT CHANGE UP (ref 0–41)
ANION GAP SERPL CALC-SCNC: 9 MMOL/L — SIGNIFICANT CHANGE UP (ref 7–14)
AST SERPL-CCNC: 26 U/L — SIGNIFICANT CHANGE UP (ref 0–41)
BASOPHILS # BLD AUTO: 0.03 K/UL — SIGNIFICANT CHANGE UP (ref 0–0.2)
BASOPHILS NFR BLD AUTO: 0.4 % — SIGNIFICANT CHANGE UP (ref 0–1)
BILIRUB SERPL-MCNC: 0.4 MG/DL — SIGNIFICANT CHANGE UP (ref 0.2–1.2)
BUN SERPL-MCNC: 40 MG/DL — HIGH (ref 10–20)
CALCIUM SERPL-MCNC: 8.5 MG/DL — SIGNIFICANT CHANGE UP (ref 8.5–10.1)
CHLORIDE SERPL-SCNC: 114 MMOL/L — HIGH (ref 98–110)
CO2 SERPL-SCNC: 29 MMOL/L — SIGNIFICANT CHANGE UP (ref 17–32)
CREAT SERPL-MCNC: 0.8 MG/DL — SIGNIFICANT CHANGE UP (ref 0.7–1.5)
EOSINOPHIL # BLD AUTO: 0.15 K/UL — SIGNIFICANT CHANGE UP (ref 0–0.7)
EOSINOPHIL NFR BLD AUTO: 1.8 % — SIGNIFICANT CHANGE UP (ref 0–8)
GLUCOSE BLDC GLUCOMTR-MCNC: 105 MG/DL — HIGH (ref 70–99)
GLUCOSE BLDC GLUCOMTR-MCNC: 84 MG/DL — SIGNIFICANT CHANGE UP (ref 70–99)
GLUCOSE BLDC GLUCOMTR-MCNC: 93 MG/DL — SIGNIFICANT CHANGE UP (ref 70–99)
GLUCOSE SERPL-MCNC: 114 MG/DL — HIGH (ref 70–99)
HCT VFR BLD CALC: 38.8 % — LOW (ref 42–52)
HGB BLD-MCNC: 11.6 G/DL — LOW (ref 14–18)
IMM GRANULOCYTES NFR BLD AUTO: 0.5 % — HIGH (ref 0.1–0.3)
LYMPHOCYTES # BLD AUTO: 1.28 K/UL — SIGNIFICANT CHANGE UP (ref 1.2–3.4)
LYMPHOCYTES # BLD AUTO: 15.3 % — LOW (ref 20.5–51.1)
MAGNESIUM SERPL-MCNC: 2.7 MG/DL — HIGH (ref 1.8–2.4)
MCHC RBC-ENTMCNC: 24.4 PG — LOW (ref 27–31)
MCHC RBC-ENTMCNC: 29.9 G/DL — LOW (ref 32–37)
MCV RBC AUTO: 81.7 FL — SIGNIFICANT CHANGE UP (ref 80–94)
MONOCYTES # BLD AUTO: 0.6 K/UL — SIGNIFICANT CHANGE UP (ref 0.1–0.6)
MONOCYTES NFR BLD AUTO: 7.2 % — SIGNIFICANT CHANGE UP (ref 1.7–9.3)
NEUTROPHILS # BLD AUTO: 6.27 K/UL — SIGNIFICANT CHANGE UP (ref 1.4–6.5)
NEUTROPHILS NFR BLD AUTO: 74.8 % — SIGNIFICANT CHANGE UP (ref 42.2–75.2)
NRBC # BLD: 0 /100 WBCS — SIGNIFICANT CHANGE UP (ref 0–0)
PLATELET # BLD AUTO: 225 K/UL — SIGNIFICANT CHANGE UP (ref 130–400)
POTASSIUM SERPL-MCNC: 3.8 MMOL/L — SIGNIFICANT CHANGE UP (ref 3.5–5)
POTASSIUM SERPL-SCNC: 3.8 MMOL/L — SIGNIFICANT CHANGE UP (ref 3.5–5)
PROT SERPL-MCNC: 6.3 G/DL — SIGNIFICANT CHANGE UP (ref 6–8)
RBC # BLD: 4.75 M/UL — SIGNIFICANT CHANGE UP (ref 4.7–6.1)
RBC # FLD: 15.1 % — HIGH (ref 11.5–14.5)
SODIUM SERPL-SCNC: 152 MMOL/L — HIGH (ref 135–146)
WBC # BLD: 8.37 K/UL — SIGNIFICANT CHANGE UP (ref 4.8–10.8)
WBC # FLD AUTO: 8.37 K/UL — SIGNIFICANT CHANGE UP (ref 4.8–10.8)

## 2021-05-12 PROCEDURE — 99232 SBSQ HOSP IP/OBS MODERATE 35: CPT

## 2021-05-12 PROCEDURE — 37191 INS ENDOVAS VENA CAVA FILTR: CPT

## 2021-05-12 PROCEDURE — 95816 EEG AWAKE AND DROWSY: CPT | Mod: 26

## 2021-05-12 PROCEDURE — 99232 SBSQ HOSP IP/OBS MODERATE 35: CPT | Mod: GC

## 2021-05-12 RX ORDER — HYDROMORPHONE HYDROCHLORIDE 2 MG/ML
1 INJECTION INTRAMUSCULAR; INTRAVENOUS; SUBCUTANEOUS
Refills: 0 | Status: DISCONTINUED | OUTPATIENT
Start: 2021-05-12 | End: 2021-05-12

## 2021-05-12 RX ORDER — CHLORHEXIDINE GLUCONATE 213 G/1000ML
1 SOLUTION TOPICAL
Refills: 0 | Status: DISCONTINUED | OUTPATIENT
Start: 2021-05-12 | End: 2021-05-13

## 2021-05-12 RX ORDER — HYDROMORPHONE HYDROCHLORIDE 2 MG/ML
0.5 INJECTION INTRAMUSCULAR; INTRAVENOUS; SUBCUTANEOUS
Refills: 0 | Status: DISCONTINUED | OUTPATIENT
Start: 2021-05-12 | End: 2021-05-12

## 2021-05-12 RX ORDER — PANTOPRAZOLE SODIUM 20 MG/1
40 TABLET, DELAYED RELEASE ORAL DAILY
Refills: 0 | Status: DISCONTINUED | OUTPATIENT
Start: 2021-05-12 | End: 2021-05-13

## 2021-05-12 RX ORDER — LOSARTAN POTASSIUM 100 MG/1
25 TABLET, FILM COATED ORAL DAILY
Refills: 0 | Status: DISCONTINUED | OUTPATIENT
Start: 2021-05-12 | End: 2021-05-13

## 2021-05-12 RX ORDER — FLUOXETINE HCL 10 MG
20 CAPSULE ORAL DAILY
Refills: 0 | Status: DISCONTINUED | OUTPATIENT
Start: 2021-05-12 | End: 2021-05-13

## 2021-05-12 RX ORDER — ASPIRIN/CALCIUM CARB/MAGNESIUM 324 MG
81 TABLET ORAL DAILY
Refills: 0 | Status: DISCONTINUED | OUTPATIENT
Start: 2021-05-12 | End: 2021-05-13

## 2021-05-12 RX ORDER — AMLODIPINE BESYLATE 2.5 MG/1
10 TABLET ORAL DAILY
Refills: 0 | Status: DISCONTINUED | OUTPATIENT
Start: 2021-05-12 | End: 2021-05-13

## 2021-05-12 RX ORDER — ONDANSETRON 8 MG/1
4 TABLET, FILM COATED ORAL ONCE
Refills: 0 | Status: DISCONTINUED | OUTPATIENT
Start: 2021-05-12 | End: 2021-05-12

## 2021-05-12 RX ORDER — ENOXAPARIN SODIUM 100 MG/ML
40 INJECTION SUBCUTANEOUS DAILY
Refills: 0 | Status: DISCONTINUED | OUTPATIENT
Start: 2021-05-12 | End: 2021-05-13

## 2021-05-12 RX ORDER — METOPROLOL TARTRATE 50 MG
25 TABLET ORAL
Refills: 0 | Status: DISCONTINUED | OUTPATIENT
Start: 2021-05-12 | End: 2021-05-13

## 2021-05-12 RX ORDER — SODIUM CHLORIDE 9 MG/ML
1000 INJECTION, SOLUTION INTRAVENOUS
Refills: 0 | Status: DISCONTINUED | OUTPATIENT
Start: 2021-05-12 | End: 2021-05-12

## 2021-05-12 RX ORDER — LEVETIRACETAM 250 MG/1
500 TABLET, FILM COATED ORAL DAILY
Refills: 0 | Status: CANCELLED | OUTPATIENT
Start: 2021-05-13 | End: 2021-05-12

## 2021-05-12 RX ADMIN — AMLODIPINE BESYLATE 10 MILLIGRAM(S): 2.5 TABLET ORAL at 05:34

## 2021-05-12 RX ADMIN — LEVETIRACETAM 420 MILLIGRAM(S): 250 TABLET, FILM COATED ORAL at 05:34

## 2021-05-12 RX ADMIN — Medication 25 MILLIGRAM(S): at 17:33

## 2021-05-12 RX ADMIN — CHLORHEXIDINE GLUCONATE 1 APPLICATION(S): 213 SOLUTION TOPICAL at 05:32

## 2021-05-12 RX ADMIN — Medication 25 MILLIGRAM(S): at 05:33

## 2021-05-12 RX ADMIN — LOSARTAN POTASSIUM 25 MILLIGRAM(S): 100 TABLET, FILM COATED ORAL at 05:33

## 2021-05-12 RX ADMIN — LOSARTAN POTASSIUM 25 MILLIGRAM(S): 100 TABLET, FILM COATED ORAL at 17:33

## 2021-05-12 RX ADMIN — ENOXAPARIN SODIUM 40 MILLIGRAM(S): 100 INJECTION SUBCUTANEOUS at 05:34

## 2021-05-12 NOTE — PROGRESS NOTE ADULT - ASSESSMENT
ASSESSMENT/PLAN  - hemorrhagic stroke  - hypernatremia  - altered mental status     - to be kept NPO   hypernatremia/hypokalemia/hypermagnesemia      - estimated REE 1008 by PSE and 1093 by MSJ eqn  -- Jevity 1.2 240 ml to infuse over 30 minutes - order it q6h     - this --> 53 gm protein & 1140 kcal/d     - add Prosource TF one a day to increase protein to 64 gm/d - document that the prosource is given, please  - obtain BMP, phos, Mg in am and expect pt to need phos

## 2021-05-12 NOTE — BEHAVIORAL HEALTH ASSESSMENT NOTE - NSBHCHARTREVIEWVS_PSY_A_CORE FT
ICU Vital Signs Last 24 Hrs  T(C): 36.1 (12 May 2021 07:28), Max: 36.7 (11 May 2021 21:23)  T(F): 97 (12 May 2021 07:28), Max: 98.1 (11 May 2021 21:23)  HR: 83 (12 May 2021 11:31) (75 - 92)  BP: 135/66 (12 May 2021 11:31) (121/70 - 172/81)  BP(mean): 100 (12 May 2021 11:31) (72 - 128)  ABP: --  ABP(mean): --  RR: 20 (12 May 2021 11:31) (17 - 20)  SpO2: 97% (12 May 2021 11:31) (96% - 100%)

## 2021-05-12 NOTE — BEHAVIORAL HEALTH ASSESSMENT NOTE - OTHER
Granddaughter N/A no psychotic perceptions elicited Fair - patient willingly participating in his care (PT/OT), However, patient also removing NG tube several times. n/a unable to assess Flat None

## 2021-05-12 NOTE — BEHAVIORAL HEALTH ASSESSMENT NOTE - NSBHREFERDETAILS_PSY_A_CORE_FT
85yo  immigrant, resides in  since 1978, recently was living in [place] for past 2yrs, moved back at the end of April 2021, with PMH of gout and nosebleeds, presented on 5/3 with aphasia & R sided weakness, s/p tPA, causing B/L frontal IPH, SAH, c/b midline shift with an acute CVA L temporal/R parietal. Psychiatry consulted for r/o depression.

## 2021-05-12 NOTE — BEHAVIORAL HEALTH ASSESSMENT NOTE - NSBHCHARTREVIEWIMAGING_PSY_A_CORE FT
CT-head (non-contrast):   IMPRESSION:    Since 5/5/2021,    The appearance of the bilateral intraparenchymal hemorrhages and regions of subarachnoid hemorrhage continues to evolve and become more subacute. There is associated stable mass effect from the right frontal hematoma with associated 4 mm midline shift to the left.    No new acute intracranial pathology.

## 2021-05-12 NOTE — BEHAVIORAL HEALTH ASSESSMENT NOTE - HPI (INCLUDE ILLNESS QUALITY, SEVERITY, DURATION, TIMING, CONTEXT, MODIFYING FACTORS, ASSOCIATED SIGNS AND SYMPTOMS)
85yo  immigrant, resides in  since 1978, recently was living in [place] for past 2yrs, moved back at the end of April 2021, with PMH of gout and nosebleeds, presented on 5/3 with aphasia & R sided weakness, s/p tPA, causing B/L frontal IPH, SAH, c/b midline shift with an acute CVA L temporal/R parietal. Psychiatry consulted for r/o depression.    Patient seen and examined. He was asked if he would speak english with provider, and he shook his head yes. Following, patient was asked what the date was, and he stared blankly at this writer. On return with Attending physician, patient was asked "how are you", and he repeated this question back to attending "how are you?" Patient no longer engaged with the team with subsequent questioning. Patient was able to move all 4 extremities. He presented with flat face, and intense eye-contact.     Granddaughter was at bedside, and provided the remainder of the information included in this H&P. She reports that patient came home from [place] (after spending 2 years with his wife) at the end of April 2021. He was fully functioning independently in [place], caring for his wife who recently broke her ankle. He was handling finances appropriately, tending to IADLs and ADLs on a daily basis. HE has no past psychiatric hx and no hx of substance abuse. His PMH Was only significant for gout and nosebleeds, but he had not seen a PMD for 2 years, as he was in [place]. On May 3rd he was brought to the ED with R. sided weakness and aphasia. He received tPA which led to further ICH and b/l CVA. He has been recovering slowly every day, and she noted that yesterday he was able to engage with PT/OT and even walked with a walker for toileting. He is alert to self and place at new baseline, responding "hospital" or "SI" to location more recently. She notes that the primary team was concerned for depression as he "exhibited 2 symptoms consistent with depression". She reports that 2 days ago, patient said that he does not feel like he is going to get better and that he is not feeling himself (literal translation from Minh "not at peace"). She denies concern for lethality/psychosis. He was placed on Prozac and received one dose to date. No reported SE at this time.

## 2021-05-12 NOTE — BEHAVIORAL HEALTH ASSESSMENT NOTE - NSBHCONSULTFOLLOWAFTERCARE_PSY_A_CORE FT
[facility] For intake, please call: [phone]. Please refer to [facility] [place] [address].  For intake, please call: [phone]

## 2021-05-12 NOTE — BEHAVIORAL HEALTH ASSESSMENT NOTE - NSBHCHARTREVIEWLAB_PSY_A_CORE FT
11.6   8.37  )-----------( 225      ( 12 May 2021 06:50 )             38.8     05-12    152<H>  |  114<H>  |  40<H>  ----------------------------<  114<H>  3.8   |  29  |  0.8    Ca    8.5      12 May 2021 06:50  Mg     2.7     05-12    TPro  6.3  /  Alb  3.6  /  TBili  0.4  /  DBili  x   /  AST  26  /  ALT  27  /  AlkPhos  94  05-12

## 2021-05-12 NOTE — PROGRESS NOTE ADULT - SUBJECTIVE AND OBJECTIVE BOX
Patient is a 86y old  Male who presents with a chief complaint of Stroke (11 May 2021 13:55)  pt seen and evaluated   npo now for procedure  ICU Vital Signs Last 24 Hrs  T(C): 36.1 (12 May 2021 07:28), Max: 36.7 (11 May 2021 21:23)  T(F): 97 (12 May 2021 07:28), Max: 98.1 (11 May 2021 21:23)  HR: 83 (12 May 2021 11:31) (75 - 92)  BP: 135/66 (12 May 2021 11:31) (121/70 - 172/81)  BP(mean): 100 (12 May 2021 11:31) (72 - 128)  RR: 20 (12 May 2021 11:31) (17 - 20)  SpO2: 97% (12 May 2021 11:31) (96% - 100%)      Drug Dosing Weight  Height (cm): 157.7 (03 May 2021 20:30)  Weight (kg): 53 (03 May 2021 20:30)  BMI (kg/m2): 21.3 (03 May 2021 20:30)  BSA (m2): 1.52 (03 May 2021 20:30)    PHYSICAL EXAM:  Constitutional:  NGT in place  Gastrointestinal: soft n/d    MEDICATIONS  (STANDING):  amLODIPine   Tablet 10 milliGRAM(s) Oral daily  aspirin enteric coated 81 milliGRAM(s) Oral daily  chlorhexidine 4% Liquid 1 Application(s) Topical <User Schedule>  enoxaparin Injectable 40 milliGRAM(s) SubCutaneous daily  FLUoxetine 20 milliGRAM(s) Oral daily  losartan 25 milliGRAM(s) Oral daily  metoprolol tartrate 25 milliGRAM(s) Oral two times a day  pantoprazole   Suspension 40 milliGRAM(s) Oral daily      Diet, NPO after Midnight:      NPO Start Date: 11-May-2021,   NPO Start Time: 23:59  Except Medications (05-11-21 @ 13:55)  Diet, NPO with Tube Feed:   Tube Feeding Modality: Nasogastric  Jevity 1.2 Dimitri  Total Volume for 24 Hours (mL): 960  Bolus  Total Volume of Bolus (mL):  240  Tube Feed Frequency: Every 6 hours   Tube Feed Start Time: 18:00  Bolus Feed Rate (mL per Hour): 480   Bolus Feed Duration (in Hours): 0.5  No Carb Prosource (1pkg = 15gms Protein)     Qty per Day:  1 (05-05-21 @ 18:09)      LABS  05-12    152<H>  |  114<H>  |  40<H>  ----------------------------<  114<H>  3.8   |  29  |  0.8    Ca    8.5      12 May 2021 06:50  Mg     2.7     05-12    TPro  6.3  /  Alb  3.6  /  TBili  0.4  /  DBili  x   /  AST  26  /  ALT  27  /  AlkPhos  94  05-12                          11.6   8.37  )-----------( 225      ( 12 May 2021 06:50 )             38.8     CAPILLARY BLOOD GLUCOSE      POCT Blood Glucose.: 105 mg/dL (12 May 2021 07:23)  POCT Blood Glucose.: 89 mg/dL (11 May 2021 22:54)  POCT Blood Glucose.: 108 mg/dL (11 May 2021 16:20)       RADIOLOGY STUDIES

## 2021-05-12 NOTE — BEHAVIORAL HEALTH ASSESSMENT NOTE - NSBHCHARTREVIEWINVESTIGATE_PSY_A_CORE FT
EK/3/21:  Ventricular Rate 76 BPM  Atrial Rate 76 BPM  P-R Interval 154 ms  QRS Duration 72 ms  Q-T Interval 396 ms  QTC Calculation(Bazett) 445 ms  P Axis 71 degrees  R Axis -13 degrees  T Axis 71 degrees  Diagnosis Line Normal sinus rhythm; Normal ECG    EEG :   Impression:  Abnormal due to the presence of: focal slowing as above    Clinical Correlation & Recommendations: Consistent with focal electrophysiological dysfunction secondary to nonspecific cause.

## 2021-05-12 NOTE — PROGRESS NOTE ADULT - ASSESSMENT
86M w/  PMHx gout presenting w/ aphasia, R sided weakness; s/p tpa. BL frontal IPH, SAH, c/b midline shift; acute CVA L temporal, R pareital.    #IPH, BL frontal, SAH c/b midline shift  in setting of acute CVA L temporal, R parietal s/p tpa  - CT/ MR Head 5/4 new large bifrontal lobar hemorrhage with hematocrit level, right larger than left, since the prior CT from 5/3/2021.  - Repeat cth 5/11 with stable findings  - Veeg no overt sz  - keppra 500 bid  - no intervention per neurosx  - behavioral health following, prozac discontinued, OP f/u recommended    #Left DVT in the external iliac, common femoral veins  - US showed DVT. Given the presence of amyloid angiopathy on MRI and recent bilateral IPH recommend against the AC  - AC contraindicated in setting of recent IPH/ SAH, Plan for IVC filter today      #HTN urgency  improved s/p nicardipine gtt  sbp goal 120s - 150s  norvasc 10  lopressor 25 bid    #Hypernatremia/ improving  - Na 159-->157-->155-->153  - C/w free water 250cc q6hrs    #Gout  controlled off medications    #DVT ppx  lovenox       86M w/  PMHx gout presenting w/ aphasia, R sided weakness; s/p tpa. BL frontal IPH, SAH, c/b midline shift; acute CVA L temporal, R pareital.    #IPH, BL frontal, SAH c/b midline shift  in setting of acute CVA L temporal, R parietal s/p tpa  - CT/ MR Head 5/4 new large bifrontal lobar hemorrhage with hematocrit level, right larger than left, since the prior CT from 5/3/2021.  - Repeat cth 5/11 with stable findings  - Veeg no overt sz  - keppra 500 QD  - no intervention per neurosx  - behavioral health following, prozac discontinued, OP f/u recommended    #Left DVT in the external iliac, common femoral veins  - US showed DVT. Given the presence of amyloid angiopathy on MRI and recent bilateral IPH recommend against the AC  - AC contraindicated in setting of recent IPH/ SAH, Plan for IVC filter today      #HTN urgency  improved s/p nicardipine gtt  sbp goal 120s - 150s  norvasc 10  lopressor 25 bid    #Hypernatremia/ improving  - Na 159-->157-->155-->153-->152  - C/w free water 250cc q6hrs    #Gout  controlled off medications    #DVT ppx  lovenox       86M w/  PMHx gout presenting w/ aphasia, R sided weakness; s/p tpa. BL frontal IPH, SAH, c/b midline shift; acute CVA L temporal, R pareital.    #IPH, BL frontal, SAH c/b midline shift  in setting of acute CVA L temporal, R parietal s/p tpa  - CT/ MR Head 5/4 new large bifrontal lobar hemorrhage with hematocrit level, right larger than left, since the prior CT from 5/3/2021.  - Repeat cth 5/11 with stable findings  - Veeg no overt sz  - keppra 500 QD and then stop.   - no intervention per neurosx    #Left DVT in the external iliac, common femoral veins  - US showed DVT. Given the presence of amyloid angiopathy on MRI and recent bilateral IPH recommend against the AC  - AC contraindicated in setting of recent IPH/ SAH, Plan for IVC filter today      #HTN urgency  improved s/p nicardipine gtt  sbp goal 120s - 150s  norvasc 10  lopressor 25 bid    #Hypernatremia/ improving  - Na 159-->157-->155-->153-->152  - C/w free water 250cc q6hrs    #Gout  controlled off medications    # Depressed mood:   Will discuss SSRI tomr with family. Stop per Psych.     #DVT ppx  lovenox

## 2021-05-12 NOTE — BEHAVIORAL HEALTH ASSESSMENT NOTE - SUMMARY
85yo  immigrant, resides in  since 1978, recently was living in [place] for past 2yrs, moved back at the end of April 2021, with PMH of gout and nosebleeds, presented on 5/3 with aphasia & R sided weakness, s/p tPA, causing B/L frontal IPH, SAH, c/b midline shift with an acute CVA L temporal/R parietal. Psychiatry consulted for depression. Per primary team and granddaughter (at bedside), patient was started on Prozac yesterday after presenting with one instance of reporting hopelessness and feeling "not at peace". He is eating appropriately, participating in PT/OT, compliant with medications while in hospital. On presentation, patient was not willing to partake in interview. Per granddaughter, he will engage at times with primary neuro team, and only answers questions with one word answers. Primary team and granddaughter are not concerned for lethality. No psychotic perceptions elicited. Patient's speech was perseverative when approached on re-interview with Attending Physician present. This could be a result of the stroke location vs. delirium. 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 Given his acute change in mentation/presentation, and recent CVA/ICH, patient's presentation is consistent with delirium. Please see recommendations below. Granddaughter mentioned one instance where patient was tearful and endorsed hopelessness and "not feeling like himself", however, no other reported sx are consistent with a major depressive episode requiring treatment with SSRI. At this time, patient expressing demoralization due to new limitations imposed by his physical health. We recommend that SSRI be discontinued, and that he follow up with Behavioral Health as an outpatient.      PLAN:   1. Primary team continue to treat underlying causes of delirium.   2. Patient does not present with a major depressive episode at this time. He reported demoralization and hopelessness, one time, related to his current physical health and new limitations. Our recommendation is to discontinue Prozac at this time, as there is no indication at this time.  Patient has only received one dose of Prozac and it has a long half-life, so there is no concern for withdrawal.  3. Please minimize use of deliriogenic medications (antihistamines, anticholinergics, opioids, BZD, SSRI/SNRI).   4. Please utilize environmental/behavioral interventions to decrease risk of worsening delirium. ie: lights on during day, lights off/quiet space at night, minimize blood draws overnight, frequent reorientation, encourage family visit.   5. Given the severity of this stressor, we recommend that patient be referred to [facility] upon discharge. Please call [phone] to schedule an intake appointment.   5. Will sign off. Please call with questions.

## 2021-05-12 NOTE — PROGRESS NOTE ADULT - SUBJECTIVE AND OBJECTIVE BOX
YANETH MERCADO  86y  Male      Patient is a 86y old  Male who presents with a chief complaint of Stroke (10 May 2021 09:40)      INTERVAL HPI/OVERNIGHT EVENTS: No events overnight      FAMILY HISTORY:  FHx: stroke (Sibling)        ICU Vital Signs Last 24 Hrs  T(C): 36.4 (11 May 2021 08:46), Max: 36.7 (10 May 2021 21:06)  T(F): 97.6 (11 May 2021 08:46), Max: 98.1 (10 May 2021 21:06)  HR: 90 (11 May 2021 08:46) (86 - 94)  BP: 137/77 (11 May 2021 08:46) (124/69 - 151/78)  BP(mean): 92 (11 May 2021 05:43) (80 - 101)  RR: 18 (11 May 2021 08:46) (17 - 20)  SpO2: 97% (11 May 2021 08:46) (97% - 99%)        PHYSICAL EXAM:  GENERAL: NAD  NERVOUS SYSTEM:  Alert, lethargic, DTRs 2+ intact and symmetric, moving all extremities. Able to hold his arm against gravity. Mild right leg weakness.   CHEST/LUNG: Clear to percussion bilaterally; No rales, rhonchi, wheezing, or rubs  HEART: Regular rate and rhythm; No murmurs, rubs, or gallops  ABDOMEN: Soft, Nontender, Nondistended; Bowel sounds present  EXTREMITIES:  2+ Peripheral Pulses, No clubbing, cyanosis, or edema, +Homans test RT  LYMPH: No lymphadenopathy noted  SKIN: No rashes or lesions    Consultant(s) Notes Reviewed:  [x ] YES  [ ] NO  Care Discussed with Consultants/Other Providers [ x] YES  [ ] NO    LABS:      RADIOLOGY & ADDITIONAL TESTS:    Imaging Personally Reviewed:  [ ] YES  [ ] NO  amLODIPine   Tablet 10 milliGRAM(s) Oral daily  chlorhexidine 4% Liquid 1 Application(s) Topical <User Schedule>  enoxaparin Injectable 40 milliGRAM(s) SubCutaneous daily  levETIRAcetam  IVPB 500 milliGRAM(s) IV Intermittent every 12 hours  losartan 25 milliGRAM(s) Oral daily  metoprolol tartrate 25 milliGRAM(s) Oral two times a day  pantoprazole   Suspension 40 milliGRAM(s) Oral daily         YANETH MERCADO  86y  Male      Patient is a 86y old  Male who presents with a chief complaint of Stroke (10 May 2021 09:40)      INTERVAL HPI/OVERNIGHT EVENTS: Right leg pain. Poor sleep. No overnight acute issues. Awaiting for IVC filter.       FAMILY HISTORY:  FHx: stroke (Sibling)        ICU Vital Signs Last 24 Hrs  T(C): 36.4 (11 May 2021 08:46), Max: 36.7 (10 May 2021 21:06)  T(F): 97.6 (11 May 2021 08:46), Max: 98.1 (10 May 2021 21:06)  HR: 90 (11 May 2021 08:46) (86 - 94)  BP: 137/77 (11 May 2021 08:46) (124/69 - 151/78)  BP(mean): 92 (11 May 2021 05:43) (80 - 101)  RR: 18 (11 May 2021 08:46) (17 - 20)  SpO2: 97% (11 May 2021 08:46) (97% - 99%)        PHYSICAL EXAM:  GENERAL: NAD  NERVOUS SYSTEM:  Alert, lethargic, DTRs 2+ intact and symmetric, moving all extremities. Able to hold his arm against gravity. Mild right leg weakness.   CHEST/LUNG: Clear to percussion bilaterally; No rales, rhonchi, wheezing, or rubs  HEART: Regular rate and rhythm; No murmurs, rubs, or gallops  ABDOMEN: Soft, Nontender, Nondistended; Bowel sounds present  EXTREMITIES:  2+ Peripheral Pulses, No clubbing, cyanosis, or edema, +Homans test RT  LYMPH: No lymphadenopathy noted  SKIN: No rashes or lesions    Consultant(s) Notes Reviewed:  [x ] YES  [ ] NO  Care Discussed with Consultants/Other Providers [ x] YES  [ ] NO    LABS:      RADIOLOGY & ADDITIONAL TESTS:    Imaging Personally Reviewed:  [ ] YES  [ ] NO  amLODIPine   Tablet 10 milliGRAM(s) Oral daily  chlorhexidine 4% Liquid 1 Application(s) Topical <User Schedule>  enoxaparin Injectable 40 milliGRAM(s) SubCutaneous daily  levETIRAcetam  IVPB 500 milliGRAM(s) IV Intermittent every 12 hours  losartan 25 milliGRAM(s) Oral daily  metoprolol tartrate 25 milliGRAM(s) Oral two times a day  pantoprazole   Suspension 40 milliGRAM(s) Oral daily

## 2021-05-12 NOTE — PRE-ANESTHESIA EVALUATION ADULT - NSANTHPEFT_GEN_ALL_CORE
chronically ill appearing male. not cooperative to exam. Has mittens on both hands  Lungs CTA  Heart RR

## 2021-05-12 NOTE — BEHAVIORAL HEALTH ASSESSMENT NOTE - CASE SUMMARY
Mr Mcmanus is an 86 year old man with no history of a psychiatric illness who was admitted to the stroke unit for the management of intracerebral hemorrhage, after receiving TPA for an embolic stroke. Psychiatry consult was called for the medication of post stroke depression.   It is unclear if patient ios depressed or not as he is aphasic and is unsble to effectively verbalise his thoughts. In addition, it is unclear if his appearance of being depressed is a sequelae of his stroke versus  depression as a result of his medical problems. Given the report of patient's baseline mental status and independence, , it is not unlikely that patient would have feelings of dysphoria and demoralization in the context of his new and severe medical problems, sudden disability and loss of his independence and autonomy. Patient does not meet criteria for a major depressive disorder. He also does not appear to be acutely psychotic or kat. It does not appear that family has any suicidal concerns for now.   At this time, patient is not considered an imminent danger to himself or others and does not need inpatient psychiatric hospitalization. Patient will benefit from supportive psychotherapy to help him develop better coping skills and better frustration tolerance to target his current stressors. There is a place for the use of antidepressants especially SSRIs  in the management of post stroke depression, the SSRI most studies being Zoloft. However, given the fact that the stroke occurred about a week ago, it is unclear if patient's symptoms are as a result of depression versus a sequelae of the stroke, it may be worthwhile to delay the use of medication until patient sees an outpatient psychiatrist. With regards to the choice of SSRI, all SSRIs can be used for the treatment of post stroke depression, however Zoloft has a better side effect profile than Prozac. In addition, Prozac has more drug- drug interactions than Zoloft which should be a consideration when given this medication to patients on multiple medications for different indications. If the decision is to continue this medication, we recommend titrating it to a good enough dose to target depression. Upon discharge from the hospital, patient can be referred to [facility] , [address] [place] [address], phone number: [phone]

## 2021-05-12 NOTE — BEHAVIORAL HEALTH ASSESSMENT NOTE - RISK ASSESSMENT
Patient is at low risk of suicide at this time. Risk factors include acute medical problem, new stressor. Patient has protective factors including: no psychiatric hx, no hx of SA/IPP, good family support, engaged in treatment, residential and employment stability. Low Acute Suicide Risk

## 2021-05-13 ENCOUNTER — INPATIENT (INPATIENT)
Facility: HOSPITAL | Age: 86
LOS: 18 days | Discharge: HOME HEALTH | End: 2021-06-01
Attending: PHYSICAL MEDICINE & REHABILITATION | Admitting: PHYSICAL MEDICINE & REHABILITATION
Payer: MEDICARE

## 2021-05-13 ENCOUNTER — TRANSCRIPTION ENCOUNTER (OUTPATIENT)
Age: 86
End: 2021-05-13

## 2021-05-13 VITALS
TEMPERATURE: 98 F | SYSTOLIC BLOOD PRESSURE: 143 MMHG | OXYGEN SATURATION: 98 % | HEART RATE: 80 BPM | RESPIRATION RATE: 18 BRPM | DIASTOLIC BLOOD PRESSURE: 66 MMHG

## 2021-05-13 VITALS
SYSTOLIC BLOOD PRESSURE: 102 MMHG | OXYGEN SATURATION: 99 % | DIASTOLIC BLOOD PRESSURE: 58 MMHG | HEART RATE: 78 BPM | TEMPERATURE: 97 F | RESPIRATION RATE: 18 BRPM

## 2021-05-13 DIAGNOSIS — R41.0 DISORIENTATION, UNSPECIFIED: ICD-10-CM

## 2021-05-13 DIAGNOSIS — I80.219 PHLEBITIS AND THROMBOPHLEBITIS OF UNSPECIFIED ILIAC VEIN: ICD-10-CM

## 2021-05-13 DIAGNOSIS — F43.20 ADJUSTMENT DISORDER, UNSPECIFIED: ICD-10-CM

## 2021-05-13 PROBLEM — M10.9 GOUT, UNSPECIFIED: Chronic | Status: ACTIVE | Noted: 2021-05-03

## 2021-05-13 LAB
BASOPHILS # BLD AUTO: 0.04 K/UL — SIGNIFICANT CHANGE UP (ref 0–0.2)
BASOPHILS NFR BLD AUTO: 0.5 % — SIGNIFICANT CHANGE UP (ref 0–1)
EOSINOPHIL # BLD AUTO: 0.15 K/UL — SIGNIFICANT CHANGE UP (ref 0–0.7)
EOSINOPHIL NFR BLD AUTO: 1.8 % — SIGNIFICANT CHANGE UP (ref 0–8)
HCT VFR BLD CALC: 36.4 % — LOW (ref 42–52)
HGB BLD-MCNC: 11 G/DL — LOW (ref 14–18)
IMM GRANULOCYTES NFR BLD AUTO: 0.2 % — SIGNIFICANT CHANGE UP (ref 0.1–0.3)
LYMPHOCYTES # BLD AUTO: 1.28 K/UL — SIGNIFICANT CHANGE UP (ref 1.2–3.4)
LYMPHOCYTES # BLD AUTO: 15.5 % — LOW (ref 20.5–51.1)
MCHC RBC-ENTMCNC: 24.8 PG — LOW (ref 27–31)
MCHC RBC-ENTMCNC: 30.2 G/DL — LOW (ref 32–37)
MCV RBC AUTO: 82.2 FL — SIGNIFICANT CHANGE UP (ref 80–94)
MONOCYTES # BLD AUTO: 0.54 K/UL — SIGNIFICANT CHANGE UP (ref 0.1–0.6)
MONOCYTES NFR BLD AUTO: 6.5 % — SIGNIFICANT CHANGE UP (ref 1.7–9.3)
NEUTROPHILS # BLD AUTO: 6.24 K/UL — SIGNIFICANT CHANGE UP (ref 1.4–6.5)
NEUTROPHILS NFR BLD AUTO: 75.5 % — HIGH (ref 42.2–75.2)
NRBC # BLD: 0 /100 WBCS — SIGNIFICANT CHANGE UP (ref 0–0)
PLATELET # BLD AUTO: 217 K/UL — SIGNIFICANT CHANGE UP (ref 130–400)
RBC # BLD: 4.43 M/UL — LOW (ref 4.7–6.1)
RBC # FLD: 14.9 % — HIGH (ref 11.5–14.5)
SARS-COV-2 RNA SPEC QL NAA+PROBE: SIGNIFICANT CHANGE UP
WBC # BLD: 8.27 K/UL — SIGNIFICANT CHANGE UP (ref 4.8–10.8)
WBC # FLD AUTO: 8.27 K/UL — SIGNIFICANT CHANGE UP (ref 4.8–10.8)

## 2021-05-13 PROCEDURE — 70450 CT HEAD/BRAIN W/O DYE: CPT | Mod: 26

## 2021-05-13 PROCEDURE — 99231 SBSQ HOSP IP/OBS SF/LOW 25: CPT

## 2021-05-13 RX ORDER — LANOLIN ALCOHOL/MO/W.PET/CERES
3 CREAM (GRAM) TOPICAL AT BEDTIME
Refills: 0 | Status: DISCONTINUED | OUTPATIENT
Start: 2021-05-13 | End: 2021-06-01

## 2021-05-13 RX ORDER — ATORVASTATIN CALCIUM 80 MG/1
1 TABLET, FILM COATED ORAL
Qty: 0 | Refills: 0 | DISCHARGE
Start: 2021-05-13

## 2021-05-13 RX ORDER — LOSARTAN POTASSIUM 100 MG/1
25 TABLET, FILM COATED ORAL DAILY
Refills: 0 | Status: DISCONTINUED | OUTPATIENT
Start: 2021-05-13 | End: 2021-06-01

## 2021-05-13 RX ORDER — ENOXAPARIN SODIUM 100 MG/ML
0 INJECTION SUBCUTANEOUS
Qty: 0 | Refills: 0 | DISCHARGE
Start: 2021-05-13

## 2021-05-13 RX ORDER — SODIUM CHLORIDE 9 MG/ML
1000 INJECTION, SOLUTION INTRAVENOUS
Refills: 0 | Status: COMPLETED | OUTPATIENT
Start: 2021-05-13 | End: 2021-05-14

## 2021-05-13 RX ORDER — AMLODIPINE BESYLATE 2.5 MG/1
5 TABLET ORAL AT BEDTIME
Refills: 0 | Status: DISCONTINUED | OUTPATIENT
Start: 2021-05-14 | End: 2021-06-01

## 2021-05-13 RX ORDER — AMLODIPINE BESYLATE 2.5 MG/1
1 TABLET ORAL
Qty: 0 | Refills: 0 | DISCHARGE
Start: 2021-05-13

## 2021-05-13 RX ORDER — LOSARTAN POTASSIUM 100 MG/1
1 TABLET, FILM COATED ORAL
Qty: 0 | Refills: 0 | DISCHARGE
Start: 2021-05-13

## 2021-05-13 RX ORDER — ASPIRIN/CALCIUM CARB/MAGNESIUM 324 MG
1 TABLET ORAL
Qty: 0 | Refills: 0 | DISCHARGE
Start: 2021-05-13

## 2021-05-13 RX ORDER — CHLORHEXIDINE GLUCONATE 213 G/1000ML
1 SOLUTION TOPICAL
Refills: 0 | Status: DISCONTINUED | OUTPATIENT
Start: 2021-05-13 | End: 2021-05-21

## 2021-05-13 RX ORDER — FLUOXETINE HCL 10 MG
1 CAPSULE ORAL
Qty: 0 | Refills: 0 | DISCHARGE
Start: 2021-05-13

## 2021-05-13 RX ORDER — PANTOPRAZOLE SODIUM 20 MG/1
0 TABLET, DELAYED RELEASE ORAL
Qty: 0 | Refills: 0 | DISCHARGE
Start: 2021-05-13

## 2021-05-13 RX ORDER — ATORVASTATIN CALCIUM 80 MG/1
80 TABLET, FILM COATED ORAL AT BEDTIME
Refills: 0 | Status: DISCONTINUED | OUTPATIENT
Start: 2021-05-13 | End: 2021-05-13

## 2021-05-13 RX ORDER — METOPROLOL TARTRATE 50 MG
25 TABLET ORAL
Refills: 0 | Status: DISCONTINUED | OUTPATIENT
Start: 2021-05-13 | End: 2021-05-21

## 2021-05-13 RX ORDER — ENOXAPARIN SODIUM 100 MG/ML
40 INJECTION SUBCUTANEOUS DAILY
Refills: 0 | Status: DISCONTINUED | OUTPATIENT
Start: 2021-05-13 | End: 2021-06-01

## 2021-05-13 RX ORDER — ASPIRIN/CALCIUM CARB/MAGNESIUM 324 MG
81 TABLET ORAL DAILY
Refills: 0 | Status: DISCONTINUED | OUTPATIENT
Start: 2021-05-13 | End: 2021-06-01

## 2021-05-13 RX ORDER — ACETAMINOPHEN 500 MG
650 TABLET ORAL EVERY 6 HOURS
Refills: 0 | Status: DISCONTINUED | OUTPATIENT
Start: 2021-05-13 | End: 2021-06-01

## 2021-05-13 RX ORDER — SENNA PLUS 8.6 MG/1
2 TABLET ORAL AT BEDTIME
Refills: 0 | Status: DISCONTINUED | OUTPATIENT
Start: 2021-05-13 | End: 2021-05-14

## 2021-05-13 RX ORDER — AMLODIPINE BESYLATE 2.5 MG/1
10 TABLET ORAL DAILY
Refills: 0 | Status: DISCONTINUED | OUTPATIENT
Start: 2021-05-13 | End: 2021-05-13

## 2021-05-13 RX ORDER — PANTOPRAZOLE SODIUM 20 MG/1
40 TABLET, DELAYED RELEASE ORAL DAILY
Refills: 0 | Status: DISCONTINUED | OUTPATIENT
Start: 2021-05-13 | End: 2021-06-01

## 2021-05-13 RX ORDER — METOPROLOL TARTRATE 50 MG
1 TABLET ORAL
Qty: 0 | Refills: 0 | DISCHARGE
Start: 2021-05-13

## 2021-05-13 RX ORDER — FLUOXETINE HCL 10 MG
20 CAPSULE ORAL DAILY
Refills: 0 | Status: DISCONTINUED | OUTPATIENT
Start: 2021-05-13 | End: 2021-05-15

## 2021-05-13 RX ORDER — CHLORHEXIDINE GLUCONATE 213 G/1000ML
1 SOLUTION TOPICAL
Qty: 1 | Refills: 0
Start: 2021-05-13 | End: 2021-05-13

## 2021-05-13 RX ADMIN — SODIUM CHLORIDE 75 MILLILITER(S): 9 INJECTION, SOLUTION INTRAVENOUS at 18:25

## 2021-05-13 RX ADMIN — ENOXAPARIN SODIUM 40 MILLIGRAM(S): 100 INJECTION SUBCUTANEOUS at 11:15

## 2021-05-13 RX ADMIN — Medication 25 MILLIGRAM(S): at 18:23

## 2021-05-13 RX ADMIN — CHLORHEXIDINE GLUCONATE 1 APPLICATION(S): 213 SOLUTION TOPICAL at 05:24

## 2021-05-13 RX ADMIN — LOSARTAN POTASSIUM 25 MILLIGRAM(S): 100 TABLET, FILM COATED ORAL at 18:23

## 2021-05-13 RX ADMIN — Medication 25 MILLIGRAM(S): at 05:25

## 2021-05-13 RX ADMIN — Medication 20 MILLIGRAM(S): at 11:14

## 2021-05-13 RX ADMIN — Medication 81 MILLIGRAM(S): at 11:14

## 2021-05-13 RX ADMIN — LOSARTAN POTASSIUM 25 MILLIGRAM(S): 100 TABLET, FILM COATED ORAL at 05:25

## 2021-05-13 RX ADMIN — PANTOPRAZOLE SODIUM 40 MILLIGRAM(S): 20 TABLET, DELAYED RELEASE ORAL at 11:15

## 2021-05-13 RX ADMIN — AMLODIPINE BESYLATE 10 MILLIGRAM(S): 2.5 TABLET ORAL at 05:25

## 2021-05-13 NOTE — DISCHARGE NOTE PROVIDER - CARE PROVIDER_API CALL
Castro Pennington)  PhysicalRehab Medicine  [address] [place] [address]  Phone: [phone]  Fax: [phone]  Follow Up Time:

## 2021-05-13 NOTE — H&P ADULT - ASSESSMENT
ASSESSMENT/PLAN    Rehab of stroke    - stroke: IPH, BL frontal, SAH c/b midline shift, CVA L temporal, R parietal s/p tpa   keppra 500 QD    -Left DVT in the external iliac, common femoral veins  IVC filter placed 5/12  lovenox 40     -HTN : sbp goal 120s - 150s  norvasc 10  lopressor 25 bid     -Pain control:   Tylenol PRN    -GI/Bowel Mgmt    pantoprazole 40 mg Oral daily    -Bladder management: condom cath     -Skin:  -No active issues at this time    -FEN   - Diet -  soft foods with nectar thick DASH  - Dysphagia       Precautions / PROPHYLAXIS:      - Falls    - Ortho: Weight bearing status: all limbs clear      - DVT prophylaxis: enoxaparin Injectable 40 mg SubQ daily and IVC filter       MEDICAL PROGNOSIS: GOOD            REHAB POTENTIAL: GOOD             ESTIMATED DISPOSITION: HOME WITH HOME CARE       [ x ]  The goals of the IRF admission were discussed with the patient and or family member, who agreed             ELOS:  [     ] 7-14    [    ]  14-21    [    ]    Other    THERAPY ORDERS and INITIAL INDIVIDUALIZED PLAN OF CARE:  This initial individualized interdisciplinary plan of care, which was established by me (the attending physiatrist), is based on elements from the post admission evaluation. The interdisciplinary therapy program is to be at least 3 hrs a day, at least 5 days per week from from physical, occupational and/ or speech therapies as ordered by me below.      [ x  ] P.T. 90 mins /day at least 5 out of 7 days:  [  x ] superficial  modalities prn, [ x  ] A/AAROM, [ x  ] PREs, [ x  ] transfer training,            [ x  ] progressive ambulation, [x   ] stairs                                               [ x  ] O.T. 90 mins. /day at least 5 out of 7 days::  [ x  ] modalities prn, [ x  ]A/AAROM, [ x  ] PREs, functional transfer training, [ x  ] ADLs,              [   ] cognitive/ perceptual eval and training, [   ] splint eval                                                  [   ] S.L.P:  [   ] speech eval, [   ] swallow eval - completed on 5/13    [   ] Neuropsychology      [   ] Individualized rec. therapy      RATIONALE FOR INPATIENT ADMISSION - Patient demonstrates the following: (check all that apply)  [X] Medically appropriate for rehabilitation admission  [X] Has attainable rehab goals with an appropriate initial discharge plan  [X] Has rehabilitation potential (expected to make a significant improvement within a reasonable period of time)  [X] Requires close medical management by a rehab physician, rehab nursing care,  and comprehensive interdisciplinary team (including PT, OT)       ASSESSMENT/PLAN    Rehab of stroke    - stroke: IPH, BL frontal, SAH c/b midline shift, CVA L temporal, R parietal s/p tpa   keppra 500 QD    -lethargy  trial amantadine 100 BID     -Left DVT in the external iliac, common femoral veins  IVC filter placed 5/12  lovenox 40     -HTN : sbp goal 120s - 150s  norvasc 10  lopressor 25 bid     -Pain control:   Tylenol PRN    -GI/Bowel Mgmt    pantoprazole 40 mg Oral daily    -Bladder management: condom cath     -Skin:  -No active issues at this time    -FEN   - Diet -  soft foods with nectar thick DASH  - Dysphagia       Precautions / PROPHYLAXIS:      - Falls    - Ortho: Weight bearing status: all limbs clear      - DVT prophylaxis: enoxaparin Injectable 40 mg SubQ daily and IVC filter       MEDICAL PROGNOSIS: GOOD            REHAB POTENTIAL: GOOD             ESTIMATED DISPOSITION: HOME WITH HOME CARE       [ x ]  The goals of the IRF admission were discussed with the patient and or family member, who agreed             ELOS:  [     ] 7-14    [    ]  14-21    [    ]    Other    THERAPY ORDERS and INITIAL INDIVIDUALIZED PLAN OF CARE:  This initial individualized interdisciplinary plan of care, which was established by me (the attending physiatrist), is based on elements from the post admission evaluation. The interdisciplinary therapy program is to be at least 3 hrs a day, at least 5 days per week from from physical, occupational and/ or speech therapies as ordered by me below.      [ x  ] P.T. 90 mins /day at least 5 out of 7 days:  [  x ] superficial  modalities prn, [ x  ] A/AAROM, [ x  ] PREs, [ x  ] transfer training,            [ x  ] progressive ambulation, [x   ] stairs                                               [ x  ] O.T. 90 mins. /day at least 5 out of 7 days::  [ x  ] modalities prn, [ x  ]A/AAROM, [ x  ] PREs, functional transfer training, [ x  ] ADLs,              [   ] cognitive/ perceptual eval and training, [   ] splint eval                                                  [   ] S.L.P:  [   ] speech eval, [   ] swallow eval - completed on 5/13    [   ] Neuropsychology      [   ] Individualized rec. therapy      RATIONALE FOR INPATIENT ADMISSION - Patient demonstrates the following: (check all that apply)  [X] Medically appropriate for rehabilitation admission  [X] Has attainable rehab goals with an appropriate initial discharge plan  [X] Has rehabilitation potential (expected to make a significant improvement within a reasonable period of time)  [X] Requires close medical management by a rehab physician, rehab nursing care,  and comprehensive interdisciplinary team (including PT, OT)       ASSESSMENT/PLAN    Rehab of stroke and bilateral IPC s/p TpA with right hemiparesis,(dominant), severe abulia and cognitive impairment and dysphagia/ language impairment. Good acute rehab candidate.        -lethargy/ Abulia  trial amantadine 100 BID     -Left DVT in the external iliac, common femoral veins  IVC filter placed 5/12  lovenox 40     -Dysphagia: Was on NGT feeds. Just started PO feeds. Monitor intake.    - Hypernatemia/ Azotemia: clinically dry. Will give IVF for a few days and monitor    -HTN : sbp goal 120s - 150s  norvasc 10  lopressor 25 bid     -Pain control:   Tylenol PRN    -GI/Bowel Mgmt    pantoprazole 40 mg Oral daily    -Bladder management: condom cath     -Skin:  -No active issues at this time    -FEN: Monitor intake on current diet       Precautions / PROPHYLAXIS:      - Falls    - Ortho: Weight bearing status: all limbs clear      - DVT: enoxaparin Injectable 40 mg SubQ daily and IVC filter     - Seizure proph: Keppra    MEDICAL PROGNOSIS: GOOD            REHAB POTENTIAL: GOOD             ESTIMATED DISPOSITION: HOME WITH HOME CARE       [ x ]  The goals of the IRF admission were discussed with the patient and or family member, who agreed             ELOS:  [     ] 7-14    [ x   ]  14-21    [    ]    Other    THERAPY ORDERS and INITIAL INDIVIDUALIZED PLAN OF CARE:  This initial individualized interdisciplinary plan of care, which was established by me (the attending physiatrist), is based on elements from the post admission evaluation. The interdisciplinary therapy program is to be at least 3 hrs a day, at least 5 days per week from from physical, occupational and/ or speech therapies as ordered by me below.      [ x  ] P.T. 90 mins /day at least 5 out of 7 days:  [  x ] superficial  modalities prn, [ x  ] A/AAROM, [ x  ] PREs, [ x  ] transfer training,            [ x  ] progressive ambulation, [x   ] stairs                                               [ x  ] O.T. 90 mins. /day at least 5 out of 7 days::  [ x  ] modalities prn, [ x  ]A/AAROM, [ x  ] PREs, functional transfer training, [ x  ] ADLs,              [ x  ] cognitive/ perceptual eval and training, [   ] splint eval                                                  [ x  ] S.L.P:  [ x  ] speech eval, [x   ] swallow eval/ f/u    [ x  ] Neuropsychology      [ x  ] Individualized rec. therapy      RATIONALE FOR INPATIENT ADMISSION - Patient demonstrates the following: (check all that apply)  [X] Medically appropriate for rehabilitation admission  [X] Has attainable rehab goals with an appropriate initial discharge plan  [X] Has rehabilitation potential (expected to make a significant improvement within a reasonable period of time)  [X] Requires close medical management by a rehab physician, rehab nursing care,  and comprehensive interdisciplinary team (including PT, OT)       ASSESSMENT/PLAN    Rehab of stroke and bilateral IPC s/p TpA with right hemiparesis,(dominant), severe abulia and cognitive impairment and dysphagia/ language impairment. Good acute rehab candidate.        -lethargy/ Abulia  trial amantadine 100 BID     -Left DVT in the external iliac, common femoral veins  IVC filter placed 5/12  lovenox 40     -Dysphagia: Was on NGT feeds. Just started PO Dysphagia 2 with nectar-thick liquid  feeds. Monitor intake.    - Hypernatremia/ Azotemia: clinically dry. Will give IVF for a few days and monitor    -HTN : sbp goal 120s - 150s  norvasc 10  lopressor 25 bid     -Pain control:   Tylenol PRN    -GI/Bowel Mgmt    pantoprazole 40 mg Oral daily    -Bladder management: condom cath     -Skin:  -No active issues at this time    -FEN: Monitor intake on current diet       Precautions / PROPHYLAXIS:      - Falls    - Ortho: Weight bearing status: all limbs clear      - DVT: enoxaparin Injectable 40 mg SubQ daily and IVC filter     - Seizure proph: Keppra    MEDICAL PROGNOSIS: GOOD            REHAB POTENTIAL: GOOD             ESTIMATED DISPOSITION: HOME WITH HOME CARE       [ x ]  The goals of the IRF admission were discussed with the patient and or family member, who agreed             ELOS:  [     ] 7-14    [ x   ]  14-21    [    ]    Other    THERAPY ORDERS and INITIAL INDIVIDUALIZED PLAN OF CARE:  This initial individualized interdisciplinary plan of care, which was established by me (the attending physiatrist), is based on elements from the post admission evaluation. The interdisciplinary therapy program is to be at least 3 hrs a day, at least 5 days per week from from physical, occupational and/ or speech therapies as ordered by me below.      [ x  ] P.T. 90 mins /day at least 5 out of 7 days:  [  x ] superficial  modalities prn, [ x  ] A/AAROM, [ x  ] PREs, [ x  ] transfer training,            [ x  ] progressive ambulation, [x   ] stairs                                               [ x  ] O.T. 90 mins. /day at least 5 out of 7 days::  [ x  ] modalities prn, [ x  ]A/AAROM, [ x  ] PREs, functional transfer training, [ x  ] ADLs,              [ x  ] cognitive/ perceptual eval and training, [   ] splint eval                                                  [ x  ] S.L.P:  [ x  ] speech eval, [x   ] swallow eval/ f/u    [ x  ] Neuropsychology      [ x  ] Individualized rec. therapy      RATIONALE FOR INPATIENT ADMISSION - Patient demonstrates the following: (check all that apply)  [X] Medically appropriate for rehabilitation admission  [X] Has attainable rehab goals with an appropriate initial discharge plan  [X] Has rehabilitation potential (expected to make a significant improvement within a reasonable period of time)  [X] Requires close medical management by a rehab physician, rehab nursing care,  and comprehensive interdisciplinary team (including PT, OT)

## 2021-05-13 NOTE — PROGRESS NOTE ADULT - ASSESSMENT
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 He has less than 12th grade education. Patient moved back to [place] a few years ago and visits family for periods of time.   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 He stays with various family members and can stay on one floor. He was fully independent PTA.   NO smoking or ETOH history. (13 May 2021 10:35)    ADLs: Independent  IADLs: Independent  Previous Cognitive Status: WFL    Previous Psychiatric History: Denied (some stressors, but kept things to himself). Jaswant with Confucianist and family support. Overall was a positive person who likes to joke around.    Previous Psychiatry/Psychotherapy follow-up:  No    Previous substance abuse history: Denied    Current status:  Mood changes:  Yes depressed  Cognition changes:  Yes  Behavior changes: No    Patient's Primary Language: Gujarati  a. Changes in understanding primary language after neurological event?    Yes   b. Preferred language for health information: Gujarati    Family's expectation of the Rehab program and Neuropsychology services: Cognition, mood, mobility    Brain injury/cognitive behavioral protocol Education provided:   Yes     Family concerns: Understanding the cognitive changes    Family Education: Patient was educated about the rehabilitation process, current status, and family education sessions. Discharge planning initiated.    L

## 2021-05-13 NOTE — H&P ADULT - NSHPREVIEWOFSYSTEMS_GEN_ALL_CORE
Constitutional WNL   Cardio WNL   Resp WNL   GI WNL  Heme WNL  Endo WNL  Skin WNL  MSK WNL  Neuro lethargic, aboulia   Cognitive WNL  Psych WNL Limited by lethargy    Constitutional Lethargic and severe psycho-motor slowing since admission   Cardio WNL   Resp WNL   GI WNL  Heme WNL  Endo WNL  Skin WNL  MSK WNL  Neuro lethargic, aboulia   Cognitive No history of impairment  Psych WNL. No history of depression

## 2021-05-13 NOTE — H&P ADULT - NSHPPHYSICALEXAM_GEN_ALL_CORE
Pt was very lethargic and unable to participate in exam. However pt participated with therapy earlier and was able to walk 5ft with RW and mod assist of 1 person. pt was also able to lift b/l Ue above head.  strength exam will be based on these observations.   PHYSICAL EXAMINATION   VItals: T(C): 36.9 (05-13-21 @ 04:17), Max: 37.2 (05-12-21 @ 17:05)  HR: 87 (05-13-21 @ 04:17) (56 - 87)  BP: 134/62 (05-13-21 @ 04:17) (114/56 - 170/70)  RR: 18 (05-13-21 @ 04:17) (12 - 20)  SpO2: 97% (05-13-21 @ 04:17) (95% - 100%)    General: NAD, Resting Comfortable, lethargic                                  HEENT: Poor tracking and fixing. Left gaze preference, bl eye lid droopibng  Cardio: RRR                              Pulm: No Respiratory Distress,  Lungs CTAB                        Abdomen: ND/NT, Soft                                              MSK: No joint swelling, pain on palpation of R calf                                          Ext: No C/C/E  Skin: intact, tenting                                                                   Neurological Examination:  Cognitive: [    ] AAO x 3   [ x   ]  other       lethargic, but attempts to follow commands                                                              Attention:  [    ] intact   [  x  ]  other                            Memory: [     ] intact    [   x ]  other   unable to assess  Mood/Affect:  [    ] wnl    [   x ]  other lethargic                                                                           Communication:  [    ]Fluent,  No dysarthria,   [    ] other   CN II - XII  [    ] intact   [  x  ] other unable to track past R  Coordination:   [    ] FTN/HTS intact   [ x   ] other                                                                           Sensory: [ x   ]Intact to light touch   [    ] other                                                                                        Tone:  [  x  ]  wnl,   [    ]  other    Motor  (see above, these scores are estimated based on observed behavior)  LEFT    UE: [   ] WNL.  [  x ] other: 3/5 range  RIGHT UE:  [   ] WNL.  [ x  ] other: 3/5 range  LEFT    LE:  [   ] WNL.  [ x  ] other: 4/5 range  RIGHT LE:  [   ] WNL.  [  x ] other:  4/5 range    Reflex:  [  x  ]  symmetric,  [    ]  other: Pt was very lethargic and unable to participate in exam. However pt participated with therapy earlier and was able to walk 5ft with RW and mod assist of 1 person. pt was also able to lift b/l Ue above head.  strength exam will be based on these observations.   PHYSICAL EXAMINATION   VItals: T(C): 36.9 (05-13-21 @ 04:17), Max: 37.2 (05-12-21 @ 17:05)  HR: 87 (05-13-21 @ 04:17) (56 - 87)  BP: 134/62 (05-13-21 @ 04:17) (114/56 - 170/70)  RR: 18 (05-13-21 @ 04:17) (12 - 20)  SpO2: 97% (05-13-21 @ 04:17) (95% - 100%)    General: NAD, Resting Comfortable, lethargic. Severe bradykinesia and lack of initiation                                 HEENT: Poor tracking and fixing. Left gaze preference, bl eye lid drooping  Cardio: RRR                              Pulm: No Respiratory Distress,  Lungs CTAB                        Abdomen: ND/NT, Soft                                              MSK: No joint swelling, pain on palpation of R calf. Pain with touching or moving RLE                                       Ext: No C/C/E  Skin: intact, Positive tenting                                                                   Neurological Examination:  Cognitive: [    ] AAO x 3   [ x   ]  other       lethargic, but attempts to follow commands. Not oriented to year, grossly to place                                                             Attention:  [    ] intact   [  x  ]  other      poor                      Memory: [     ] intact    [   x ]  other   unable to assess  Mood/Affect:  [    ] wnl    [   x ]  other lethargic, flat affect                                                                           Communication:  [  x  ]Fluent,  No dysarthria,   [    ] other - single word answers with delay  CN II - XII  [    ] intact   [  x  ] other unable to track to R  Coordination:   [    ] FTN/HTS intact   [ x   ] other  limited exam. Slow response. No ataxia                                                                         Sensory: [ x   ]Intact to light touch   [    ] other                                                                                        Tone:  [  x  ]  wnl,   [    ]  other    Motor  (see above, these scores are estimated based on observed behavior)  LEFT    UE: [   ] WNL.  [  x ] other: 3/5 range  RIGHT UE:  [   ] WNL.  [ x  ] other: 3-/5 range - positive drift  LEFT    LE:  [   ] WNL.  [ x  ] other: 4/5 range  RIGHT LE:  [   ] WNL.  [  x ] other:  2+/5 range - limited by pain    Reflex:  [  x  ]  symmetric,  [    ]  other:

## 2021-05-13 NOTE — PROGRESS NOTE ADULT - NSICDXPILOT_GEN_ALL_CORE
[place]

## 2021-05-13 NOTE — PROGRESS NOTE ADULT - REASON FOR ADMISSION
Stroke

## 2021-05-13 NOTE — PROGRESS NOTE ADULT - ASSESSMENT
86M w/  PMHx gout presenting w/ aphasia, R sided weakness; s/p tpa. BL frontal IPH, SAH, c/b midline shift; acute CVA L temporal, R pareital.    #IPH, BL frontal, SAH c/b midline shift  in setting of acute CVA L temporal, R parietal s/p tpa  - CT/ MR Head 5/4 new large bifrontal lobar hemorrhage with hematocrit level, right larger than left, since the prior CT from 5/3/2021.  - Repeat cth 5/11 with stable findings  - Veeg no overt sz  - keppra 500 QD and then stop.   - no intervention per neurosx  - Repeat CTH today, d/c to rehab if stable    #Left DVT in the external iliac, common femoral veins  - US showed DVT. Given the presence of amyloid angiopathy on MRI and recent bilateral IPH recommend against the AC  - AC contraindicated in setting of recent IPH/ SAH, s/p IVC filter 5/12      #HTN urgency  improved s/p nicardipine gtt  sbp goal 120s - 150s  norvasc 10  lopressor 25 bid    #Hypernatremia/ improving  - Na 159-->157-->155-->153-->152  - C/w free water 250cc q6hrs    #Gout  controlled off medications    # Depressed mood:   c/w fluoxetine     #DVT ppx  lovenox       86M w/  PMHx gout presenting w/ aphasia, R sided weakness; s/p tpa. BL frontal IPH, SAH, c/b midline shift; acute CVA L temporal, R pareital.    #IPH, BL frontal, SAH c/b midline shift  in setting of acute CVA L temporal, R parietal s/p tpa  - CT/ MR Head 5/4 new large bifrontal lobar hemorrhage with hematocrit level, right larger than left, since the prior CT from 5/3/2021.  - Repeat cth 5/11 with stable findings  - Veeg no overt sz  - keppra 500 QD and then stop.   - no intervention per neurosx  - Repeat CTH today, d/c to rehab if stable    #Left DVT in the external iliac, common femoral veins  - US showed DVT. Given the presence of amyloid angiopathy on MRI and recent bilateral IPH recommend against the AC  - AC contraindicated in setting of recent IPH/ SAH, s/p IVC filter 5/12      #HTN urgency  improved s/p nicardipine gtt  sbp goal 120s - 150s  norvasc 10  lopressor 25 bid    #Hypernatremia/ improving  - Na 159-->157-->155-->153-->152  - C/w free water 250cc q6hrs    #Gout  controlled off medications    # Depressed mood:   spoke w/ granddaughter at bedside, she does not wish to continue w/ prozac in light of the possible side effects despite the potential benefit in the treatment of depression s/p stroke    #DVT ppx  lovenox

## 2021-05-13 NOTE — H&P ADULT - NSHPLABSRESULTS_GEN_ALL_CORE
11.0   8.27  )-----------( 217      ( 13 May 2021 07:39 )             36.4     05-12    152<H>  |  114<H>  |  40<H>  ----------------------------<  114<H>  3.8   |  29  |  0.8    Ca    8.5      12 May 2021 06:50  Mg     2.7     05-12    TPro  6.3  /  Alb  3.6  /  TBili  0.4  /  DBili  x   /  AST  26  /  ALT  27  /  AlkPhos  94  05-12    PT/INR - ( 11 May 2021 16:00 )   PT: 12.70 sec;   INR: 1.10 ratio         PTT - ( 11 May 2021 16:00 )  PTT:30.3 sec    POCT Blood Glucose.: 84 mg/dL (05-12-21 @ 18:06)  POCT Blood Glucose.: 93 mg/dL (05-12-21 @ 14:18)  POCT Blood Glucose.: 105 mg/dL (05-12-21 @ 07:23)  POCT Blood Glucose.: 89 mg/dL (05-11-21 @ 22:54)  POCT Blood Glucose.: 108 mg/dL (05-11-21 @ 16:20)  POCT Blood Glucose.: 104 mg/dL (05-11-21 @ 12:03)  POCT Blood Glucose.: 142 mg/dL (05-11-21 @ 06:28)  POCT Blood Glucose.: 102 mg/dL (05-10-21 @ 23:33)  POCT Blood Glucose.: 97 mg/dL (05-10-21 @ 16:45)  POCT Blood Glucose.: 141 mg/dL (05-10-21 @ 11:55)  POCT Blood Glucose.: 115 mg/dL (05-10-21 @ 07:40)  POCT Blood Glucose.: 121 mg/dL (05-09-21 @ 21:23)                CT Head No Cont:   EXAM:  CT BRAIN            PROCEDURE DATE:  05/10/2021            INTERPRETATION:  Clinical History / Reason for exam: Stroke with hemorrhagic conversion. Clinical concern for acute worsening    TECHNIQUE: CT of the head was performed without the administration of intravenous contrast.    COMPARISON: CT head dated 5/5/2021.    FINDINGS:    Again noted are evolving bilateral intraparenchymal lobar hemorrhages in the bilateral frontal lobes, right greater than left. There is essentially unchanged4 mm midline shift to the left and adjacent mass effect on the right lateral ventricle anterior horn. Previously noted regions of subarachnoid hemorrhage also developed a more subacute appearance. No new intracranial hemorrhage or large region of loss of gray-white differentiation is appreciated.    Prominent ventricles are again noted to be stable without evidence of hydrocephalus.    Bilateral cataract surgery. Intraorbital contents and osseous structures are unchanged. Imaged soft tissues appear within normal limits. Again noted are retention cysts within the frontal sinus and right ethmoid sinus. Mastoid air cells are well aerated.    IMPRESSION:    Since 5/5/2021,    The appearance of the bilateral intraparenchymal hemorrhages and regions of subarachnoid hemorrhage continues to evolve and become more subacute. There is associated stable mass effect from the right frontal hematoma with associated 4 mm midline shift to the left.    No new acute intracranial pathology.            DAVID LASWON, RESIDENT RADIOLOGIST  This document has been electronically signed.  APRIL HADLEY MD; Attending Interventional Radiologist  This document has been electronically signed. May 10 2021  8:19PM (05-10-21 @ 17:42) 11.0   8.27  )-----------( 217      ( 13 May 2021 07:39 )             36.4     05-12    152<H>  |  114<H>  |  40<H>  ----------------------------<  114<H>  3.8   |  29  |  0.8    Ca    8.5      12 May 2021 06:50  Mg     2.7     05-12    TPro  6.3  /  Alb  3.6  /  TBili  0.4  /  DBili  x   /  AST  26  /  ALT  27  /  AlkPhos  94  05-12    PT/INR - ( 11 May 2021 16:00 )   PT: 12.70 sec;   INR: 1.10 ratio         PTT - ( 11 May 2021 16:00 )  PTT:30.3 sec    POCT Blood Glucose.: 84 mg/dL (05-12-21 @ 18:06)  POCT Blood Glucose.: 93 mg/dL (05-12-21 @ 14:18)  POCT Blood Glucose.: 105 mg/dL (05-12-21 @ 07:23)  POCT Blood Glucose.: 89 mg/dL (05-11-21 @ 22:54)  POCT Blood Glucose.: 108 mg/dL (05-11-21 @ 16:20)  POCT Blood Glucose.: 104 mg/dL (05-11-21 @ 12:03)  POCT Blood Glucose.: 142 mg/dL (05-11-21 @ 06:28)  POCT Blood Glucose.: 102 mg/dL (05-10-21 @ 23:33)  POCT Blood Glucose.: 97 mg/dL (05-10-21 @ 16:45)  POCT Blood Glucose.: 141 mg/dL (05-10-21 @ 11:55)  POCT Blood Glucose.: 115 mg/dL (05-10-21 @ 07:40)  POCT Blood Glucose.: 121 mg/dL (05-09-21 @ 21:23)                CT Head No Cont:   EXAM:  CT BRAIN            PROCEDURE DATE:  05/10/2021            INTERPRETATION:  Clinical History / Reason for exam: Stroke with hemorrhagic conversion. Clinical concern for acute worsening    TECHNIQUE: CT of the head was performed without the administration of intravenous contrast.    COMPARISON: CT head dated 5/5/2021.    FINDINGS:    Again noted are evolving bilateral intraparenchymal lobar hemorrhages in the bilateral frontal lobes, right greater than left. There is essentially unchanged4 mm midline shift to the left and adjacent mass effect on the right lateral ventricle anterior horn. Previously noted regions of subarachnoid hemorrhage also developed a more subacute appearance. No new intracranial hemorrhage or large region of loss of gray-white differentiation is appreciated.    Prominent ventricles are again noted to be stable without evidence of hydrocephalus.    Bilateral cataract surgery. Intraorbital contents and osseous structures are unchanged. Imaged soft tissues appear within normal limits. Again noted are retention cysts within the frontal sinus and right ethmoid sinus. Mastoid air cells are well aerated.    IMPRESSION:    Since 5/5/2021,    The appearance of the bilateral intraparenchymal hemorrhages and regions of subarachnoid hemorrhage continues to evolve and become more subacute. There is associated stable mass effect from the right frontal hematoma with associated 4 mm midline shift to the left.    No new acute intracranial pathology.      < from: [facility] Duplex Lower Ext Vein Scan, Bilat (05.11.21 @ 11:57) >    Impression:    Right leg free from thrombus  Left left DVT in the external iliac, common femoral veins    < end of copied text >          DAVID LAWSON, RESIDENT RADIOLOGIST  This document has been electronically signed.  APRIL HADLEY MD; Attending Interventional Radiologist  This document has been electronically signed. May 10 2021  8:19PM (05-10-21 @ 17:42)

## 2021-05-13 NOTE — DISCHARGE NOTE PROVIDER - NSDCMRMEDTOKEN_GEN_ALL_CORE_FT
amLODIPine 10 mg oral tablet: 1 tab(s) orally once a day  Antiseptic Skin Cleanser 4% topical liquid: 1 application topically once  aspirin 81 mg oral delayed release tablet: 1 tab(s) orally once a day  enoxaparin:   FLUoxetine 20 mg oral capsule: 1 cap(s) orally once a day  losartan 25 mg oral tablet: 1 tab(s) orally once a day  metoprolol tartrate 25 mg oral tablet: 1 tab(s) orally 2 times a day  pantoprazole 40 mg oral granule, delayed release:  orally    amLODIPine 10 mg oral tablet: 1 tab(s) orally once a day  Antiseptic Skin Cleanser 4% topical liquid: 1 application topically once  aspirin 81 mg oral delayed release tablet: 1 tab(s) orally once a day  atorvastatin 80 mg oral tablet: 1 tab(s) orally once a day (at bedtime)  enoxaparin:   FLUoxetine 20 mg oral capsule: 1 cap(s) orally once a day  losartan 25 mg oral tablet: 1 tab(s) orally once a day  metoprolol tartrate 25 mg oral tablet: 1 tab(s) orally 2 times a day  pantoprazole 40 mg oral granule, delayed release:  orally

## 2021-05-13 NOTE — PROGRESS NOTE ADULT - SUBJECTIVE AND OBJECTIVE BOX
Vascular Surgery Post Operative Note    Christiana ROGERSyMben  [id number]  21 @ 01:28  Procedure: Status post ivc filter placement   Post Operative day #1    Patient resting comfortably     T(C): 36.8 (21 @ 21:43), Max: 37.2 (21 @ 17:05)  HR: 68 (21 @ 00:13) (56 - 85)  BP: 114/56 (21 @ 00:13) (114/56 - 170/70)  RR: 18 (21 @ 00:13) (12 - 20)  SpO2: 96% (21 @ 00:13) (95% - 100%)    21 @ 07:01  -  21 @ 01:28  --------------------------------------------------------  IN: 490 mL / OUT: 0 mL / NET: 490 mL        General:Alert and oriented times 3, not in acute distress   Heart: Regular rate and rhythm, no rubs , murmurs or gallops  Lungs: Clear to auscultation bilaterally, no wheezes, rales, rhonci appreciated  Abdomen: Soft , positive bowel sounds, no tenderness, no distention, no peritoneal signs   Exremites: right Groin- incision clean dry and intact,  warm extremities,  good color, no swelling, motor and sensation , pulses                     11.6   8.37  )-----------( 225      (  @ 06:50 )             38.8                12.4   8.82  )-----------( 220      (  @ 05:43 )             42.1                    152   |  114   |  40                 Ca: 8.5    BMP:   ----------------------------< 114    M.7   (21 @ 06:50)             3.8    |  29    | 0.8                Ph: x        LFT:     TPro: 6.3 / Alb: 3.6 / TBili: 0.4 / DBili: x / AST: 26 / ALT: 27 / AlkPhos: 94   (21 @ 06:50)          PT/INR - ( 11 May 2021 16:00 )   PT: 12.70 sec;   INR: 1.10 ratio         PTT - ( 11 May 2021 16:00 )  PTT:30.3 sec

## 2021-05-13 NOTE — PROGRESS NOTE ADULT - ATTENDING COMMENTS
I have personally seen and examined this patient on 5/10.  I have fully participated in the care of this patient.  I have reviewed all pertinent clinical information, including history, physical exam, plan and note. Patient is lethargic and not interested on exam. Answered few words in English. Right sided weakness. Repeat CT head. Routine EEG. Consider SSRI.   I have reviewed all pertinent clinical information and reviewed all relevant imaging and diagnostic studies personally.  Recommendations as above.  Agree with above assessment except as noted.
I have personally seen and examined this patient.  I have fully participated in the care of this patient.  I have reviewed all pertinent clinical information, including history, physical exam, plan and note. Patient is less cooperative on exam. Answers questions intermittently. Able to hold his arms against gravity. Poor affect. Continue Fluoxetine, ASA. Repeat CT head today and then ready to be discharged to rehab.     I have reviewed all pertinent clinical information and reviewed all relevant imaging and diagnostic studies personally.  Recommendations as above.  Agree with above assessment except as noted.
I have personally seen and examined this patient on 5/12  I have fully participated in the care of this patient.  I have reviewed all pertinent clinical information, including history, physical exam, plan and note. Plan for IVC filter today due to right leg DVT. Vascular following. Would ask about specific instruction about PT and right DVT. No AC recommended. Continue ASA 81. SBP monitor. PT/ OT. Will discuss SSRI.     I have reviewed all pertinent clinical information and reviewed all relevant imaging and diagnostic studies personally.  Recommendations as above.  Agree with above assessment except as noted.
I have personally seen and examined this patient on 5/8.  I have fully participated in the care of this patient.  I have reviewed all pertinent clinical information, including history, physical exam, plan and note. Patient presented with acute aphasia and right sided weakness. Initial CT head showed no hemorrhage Received tPA. No neuro changes within 24 hours. MRI brain done after 24 hours showed bilateral lobar hemisphere hemorrhage and micro hemorrhages in occipital locb on SWI. Currently aphasic. Alert. Right sided weakness. Down grade to stroke unit. Currently in critical care service due t  I have reviewed all pertinent clinical information and reviewed all relevant imaging and diagnostic studies personally.  Recommendations as above.  Agree with above assessment except as noted.
86 year old right handed gentleman presents to the ED when found to be aphasic with right sided weakness at 12 noon yesterday   Patient with NIHSS 18. CTH without intracranial pathology. Patient given IV TPA. . Remains aphasic with L gaze preference. Pending repeat CTH. MRI and repeat CT consistent with bilateral hemorrhages (without clinical worsening).  Continue to obsrve in ICU with q 1 hr neurochecks, BP reduction to goal SBP of 120 to 150, and neurosurgery consult.
I have personally seen and examined this patient on 5/11  I have fully participated in the care of this patient.  I have reviewed all pertinent clinical information, including history, physical exam, plan and note. Patient is more alert. Flat affect. Follows commands. Able to hold his arms against gravity. Right leg weak and tender to touch. US showed DVT. No AC given the bilateral IPH and presence of amyloid angiopathy. IVC filter. Continue Keppra. SSRI for depression. PT/OT. Follow up on swallow eval.    I have reviewed all pertinent clinical information and reviewed all relevant imaging and diagnostic studies personally.  Recommendations as above.  Agree with above assessment except as noted.

## 2021-05-13 NOTE — PROGRESS NOTE ADULT - SUBJECTIVE AND OBJECTIVE BOX
YANETH MERCADO  86y  Male      Patient is a 86y old  Male who presents with a chief complaint of Stroke (10 May 2021 09:40)      INTERVAL HPI/OVERNIGHT EVENTS: Right leg pain. Poor sleep. No overnight acute issues. Awaiting for IVC filter.       FAMILY HISTORY:  FHx: stroke (Sibling)        ICU Vital Signs Last 24 Hrs  ICU Vital Signs Last 24 Hrs  T(C): 36.9 (13 May 2021 04:17), Max: 37.2 (12 May 2021 17:05)  T(F): 98.4 (13 May 2021 04:17), Max: 98.9 (12 May 2021 17:05)  HR: 72 (13 May 2021 09:00) (56 - 87)  BP: 97/54 (13 May 2021 09:00) (97/54 - 170/70)  BP(mean): 81 (13 May 2021 00:13) (81 - 100)  RR: 18 (13 May 2021 09:00) (12 - 20)  SpO2: 97% (13 May 2021 09:00) (95% - 100%)        PHYSICAL EXAM:  GENERAL: NAD  NERVOUS SYSTEM:  Alert, lethargic, DTRs 2+ intact and symmetric, moving all extremities. Able to hold his arm against gravity. Mild right leg weakness.   CHEST/LUNG: Clear to percussion bilaterally; No rales, rhonchi, wheezing, or rubs  HEART: Regular rate and rhythm; No murmurs, rubs, or gallops  ABDOMEN: Soft, Nontender, Nondistended; Bowel sounds present  EXTREMITIES:  2+ Peripheral Pulses, No clubbing, cyanosis, or edema, RT groin dressing intact, no hematoma, no edema, slight pain to area  LYMPH: No lymphadenopathy noted  SKIN: No rashes or lesions    Consultant(s) Notes Reviewed:  [x ] YES  [ ] NO  Care Discussed with Consultants/Other Providers [ x] YES  [ ] NO    LABS:      RADIOLOGY & ADDITIONAL TESTS:    Imaging Personally Reviewed:  [ ] YES  [ ] NO  amLODIPine   Tablet 10 milliGRAM(s) Oral daily  chlorhexidine 4% Liquid 1 Application(s) Topical <User Schedule>  enoxaparin Injectable 40 milliGRAM(s) SubCutaneous daily  levETIRAcetam  IVPB 500 milliGRAM(s) IV Intermittent every 12 hours  losartan 25 milliGRAM(s) Oral daily  metoprolol tartrate 25 milliGRAM(s) Oral two times a day  pantoprazole   Suspension 40 milliGRAM(s) Oral daily

## 2021-05-13 NOTE — H&P ADULT - HISTORY OF PRESENT ILLNESS
HPI:  86 year old right handed gentleman with no significant PMH presented to the ED with his granddaughter. Patient last known well at 11:30 AM. Patient found to be aphasic with right sided weakness at around noon.   Stroke code and code NI called on arrival. Patient with NIHSS 18. CTH without intracranial pathology.  Patient given IV TPA at 13:23. He was hypertensive on presentation () and was given Labetalol 20 mg IVP and started on nicardipine drip (now off) called for MICU  (03 May 2021 16:17)    Hospital Course:  Stroke code and code NI called on arrival. Patient with NIHSS 18. CTH without intracranial pathology.  Patient given IV TPA at 13:23. He was hypertensive on presentation () and was given Labetalol 20 mg IVP and started on nicardipine drip (now off) called for MICU  (03 May 2021 16:17)    Ct scan post TPA showed ICH. Pt placed on seizure ppx  MRI and repeat CT consistent with bifrontal bilateral hemorrhages (without clinical worsening), which are likely 2/2 to hemorrhagic conversion of infarction after administration of TPA. No NSx intervention at this time, as evacuation will not likely improve the patient's functional neurological prognosis. Stability scan shows stable bleed. vEEG overnight negative for subclinical seizures. He was seen by ST and made NPO, fed with NGT.   Pt later devolved pain in R calf and was found to have DVT. Pt is not a candidate for further anticoagulation, so IVC filter was placed 5/12. Pt tolerated procedure well. Pt was revaluated by speech and was advanced to soft food with nectar thick fluids. pt was cleared for Dc to rehab unit.          HPI:  86 year old right handed gentleman with no significant PMH presented to the ED with his granddaughter. Patient last known well at 11:30 AM. Patient found to be aphasic with right sided weakness at around noon.   Stroke code and code NI called on arrival. Patient with NIHSS 18. CTH without intracranial pathology.  Patient given IV TPA at 13:23. He was hypertensive on presentation () and was given Labetalol 20 mg IVP and started on nicardipine drip (now off) called for MICU  (03 May 2021 16:17)    Hospital Course:  Stroke code and code NI called on arrival. Patient with NIHSS 18. CTH without intracranial pathology.  Patient given IV TPA at 13:23. 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 Pt placed on seizure ppx  MRI and repeat CT consistent with bifrontal bilateral hemorrhages (without clinical worsening), which are likely 2/2 to hemorrhagic conversion of infarction after administration of TPA. No NSx intervention at this time, as evacuation will not likely improve the patient's functional neurological prognosis. Stability scan shows stable bleed. vEEG overnight negative for subclinical seizures. He was seen by ST and made NPO, fed with NGT.   Pt later devolved pain in R calf and was found to have DVT. Pt is not a candidate for further anticoagulation, so IVC filter was placed 5/12. Pt tolerated procedure well. Pt was revaluated by speech and was advanced to pureed diet with nectar thick fluids. Patient was cleared for Dc to rehab unit.     He is tolerating bedside therapy and was able to stand and ambulate short distances with mod assistance. He was seen by Physiatry on the Stroke service and was found to be a good acute inpatient rehab candidate.           HPI:  86 year old right handed gentleman with no significant PMH presented to the ED with his granddaughter. Patient last known well at 11:30 AM. Patient found to be aphasic with right sided weakness at around noon.   Stroke code and code NI called on arrival. Patient with NIHSS 18. CTH without intracranial pathology.  Patient given IV TPA at 13:23. He was hypertensive on presentation () and was given Labetalol 20 mg IVP and started on nicardipine drip (now off) called for MICU  (03 May 2021 16:17)    Hospital Course:  Stroke code and code NI called on arrival. Patient with NIHSS 18. CTH without intracranial pathology.  Patient given IV TPA at 13:23. 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 Pt placed on seizure ppx  MRI and repeat CT consistent with bifrontal bilateral hemorrhages (without clinical worsening), which are likely 2/2 to hemorrhagic conversion of infarction after administration of TPA. No NSx intervention at this time, as evacuation will not likely improve the patient's functional neurological prognosis. Stability scan shows stable bleed. vEEG overnight negative for subclinical seizures. He was seen by ST and made NPO, fed with NGT.   Pt later devolved pain in R calf and was found to have DVT. Pt is not a candidate for further anticoagulation, so IVC filter was placed 5/12. Pt tolerated procedure well. Pt was revaluated by speech and was advanced to mechanical soft diet with nectar thick fluids. Patient was cleared for Dc to rehab unit.     He is tolerating bedside therapy and was able to stand and ambulate short distances with mod assistance. He was seen by Physiatry on the Stroke service and was found to be a good acute inpatient rehab candidate.

## 2021-05-13 NOTE — DISCHARGE NOTE NURSING/CASE MANAGEMENT/SOCIAL WORK - PATIENT PORTAL LINK FT
You can access the FollowMyHealth Patient Portal offered by [facility] by registering at the following website: [email or web address]. By joining [facility]’s FollowMyHealth portal, you will also be able to view your health information using other applications (apps) compatible with our system.

## 2021-05-13 NOTE — H&P ADULT - ATTENDING COMMENTS
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 Good acute rehab candidate.        -lethargy/ Abulia  trial amantadine 100 BID     -Left DVT in the external iliac, common femoral veins  IVC filter placed 5/12  lovenox 40     -Dysphagia: Was on NGT feeds. Just started PO feeds. Monitor intake.    - Hypernatemia/ Azotemia: clinically dry. Will give IVF for a few days and monitor    -HTN : sbp goal 120s - 150s  norvasc 10  lopressor 25 bid     -Pain control:   Tylenol PRN    -GI/Bowel Mgmt    pantoprazole 40 mg Oral daily    -Bladder management: condom cath     -Skin:  -No active issues at this time    -FEN: Monitor intake on current diet       Precautions / PROPHYLAXIS:      - Falls    - Ortho: Weight bearing status: all limbs clear      - DVT: enoxaparin Injectable 40 mg SubQ daily and IVC filter     - Seizure proph: Keppra    I examined the patient with the resident and we discussed the findings and treatment plan, which I edited. I agree with the findings and treatment plan as above. The patient requires 3 hrs a day of acute inpatient rehab.   Monitor on pureed diet with nectar- thick liquids. Give IVF NS for dehydration. Trial of Amantidine for abulia/ lethargy Rehab of stroke and bilateral IPC s/p TpA with right hemiparesis,(dominant), severe abulia and cognitive impairment and dysphagia/ language impairment. Good acute rehab candidate.        -lethargy/ Abulia  trial amantadine 100 BID     -Left DVT in the external iliac, common femoral veins  IVC filter placed 5/12  lovenox 40. Not a candidate for full anticoagulation secondary to presumed Amyloid Angiopathy. Cleared only for ASA per Neuro    -Dysphagia: Was on NGT feeds. Just started PO feeds - soft and nectar.  Monitor intake.    - Hypernatremia/ Azotemia: clinically dry. Will give IVF for a few days and monitor    -HTN : sbp goal 120s - 150s  norvasc 10  lopressor 25 bid     -Pain control:   Tylenol PRN    -GI/Bowel Mgmt    pantoprazole 40 mg Oral daily    -Bladder management: condom cath     -Skin:  -No active issues at this time    -FEN: Monitor intake on current diet       Precautions / PROPHYLAXIS:      - Falls    - Ortho: Weight bearing status: all limbs clear      - DVT: enoxaparin Injectable 40 mg SubQ daily and IVC filter     - Seizure proph: Keppra    I examined the patient with the resident and we discussed the findings and treatment plan, which I edited. I agree with the findings and treatment plan as above. The patient requires 3 hrs a day of acute inpatient rehab.   Monitor on pureed diet with nectar- thick liquids. Give IVF NS for dehydration. Trial of Amantidine for abulia/ lethargy

## 2021-05-13 NOTE — PROGRESS NOTE ADULT - PROVIDER SPECIALTY LIST ADULT
Critical Care
Vascular Surgery
Critical Care
Internal Medicine
Neurology
Nutrition Support
Critical Care
Hospitalist
Internal Medicine
Neurology
Critical Care
Critical Care
Internal Medicine
Neurology
Nutrition Support
Pulmonology

## 2021-05-13 NOTE — DISCHARGE NOTE PROVIDER - HOSPITAL COURSE
86M w/  PMHx gout presenting w/ aphasia, R sided weakness; s/p tpa. BL frontal IPH, SAH, c/b midline shift; acute CVA L temporal, R pareital.    #IPH, BL frontal, SAH c/b midline shift  in setting of acute CVA L temporal, R parietal s/p tpa  - CT/ MR Head 5/4 new large bifrontal lobar hemorrhage with hematocrit level, right larger than left, since the prior CT from 5/3/2021.  - Repeat cth 5/11 with stable findings  - Veeg no overt sz  - keppra 500 QD and then stop.   - no intervention per neurosx  - Repeat CTH today: Slight interval decreased size of the lobar hemorrhages in the bilateral frontal lobes (right larger than left) with stable surrounding edema and mass effect. Moderate interval decreased size of the smaller hemorrhages within the right occipital and left temporal lobes. Interval decrease in the scattered subarachnoid hemorrhage. No new intracranial hemorrhage demonstrated.  - Cleared for d/c to rehab     #Left DVT in the external iliac, common femoral veins  - US showed DVT. Given the presence of amyloid angiopathy on MRI and recent bilateral IPH recommend against the AC  - AC contraindicated in setting of recent IPH/ SAH, s/p IVC filter 5/12      #HTN urgency  improved s/p nicardipine gtt  sbp goal 120s - 150s  norvasc 10  lopressor 25 bid    #Hypernatremia/ improving  - Na 159-->157-->155-->153-->152  - C/w free water 250cc q6hrs    #Gout  controlled off medications    # Depressed mood:   c/w fluoxetine     #DVT ppx  lovenox     86M w/  PMHx gout presenting w/ aphasia, R sided weakness; s/p tpa developed  BL frontal IPH, SAH, c/b midline shift; No intervention, No significant changes after ICH. Follow up CT heads were stable. Downgraded to Stroke unit and then remained stable. MRI Brain showed evidance of amyloid angiopathy as well suggestive of underlying condition for post tpa hemorrhage. Hospital course complicated by left leg DVT required IVC placement with no Anticoagulation.     #IPH, BL frontal, SAH c/b midline shift  in setting of acute CVA L temporal, R parietal s/p tpa  - CT/ MR Head 5/4 new large bifrontal lobar hemorrhage with hematocrit level, right larger than left, since the prior CT from 5/3/2021.  - Repeat cth 5/11 with stable findings  - Veeg no overt sz  - keppra 500 QD and then stop.   - no intervention per neurosx  - Repeat CTH today: Slight interval decreased size of the lobar hemorrhages in the bilateral frontal lobes (right larger than left) with stable surrounding edema and mass effect. Moderate interval decreased size of the smaller hemorrhages within the right occipital and left temporal lobes. Interval decrease in the scattered subarachnoid hemorrhage. No new intracranial hemorrhage demonstrated.  - Cleared for d/c to rehab     #Left DVT in the external iliac, common femoral veins  - US showed DVT. Given the presence of amyloid angiopathy on MRI and recent bilateral IPH recommend against the AC  - AC contraindicated in setting of recent IPH/ SAH, s/p IVC filter 5/12      #HTN urgency  improved s/p nicardipine gtt  sbp goal 120s - 150s  norvasc 10  lopressor 25 bid    #Hypernatremia/ improving  - Na 159-->157-->155-->153-->152  - C/w free water 250cc q6hrs    #Gout  controlled off medications    # Depressed mood:   c/w fluoxetine     #DVT ppx  lovenox

## 2021-05-13 NOTE — DISCHARGE NOTE PROVIDER - NSDCCPCAREPLAN_GEN_ALL_CORE_FT
PRINCIPAL DISCHARGE DIAGNOSIS  Diagnosis: Intraparenchymal hemorrhage of brain  Assessment and Plan of Treatment: You presented to the hospital due to a stroke of the  Left temporal anf Right parietal lobes of the brain. You suffered bleeding as a result of thrombotic therapy to treat your stroke. Repeat imaging have demonstrated the size of the hemorrhages. You are cleared from a medical standpoint to be discharged to rehabilitaion.

## 2021-05-13 NOTE — H&P ADULT - NSHPSOCIALHISTORY_GEN_ALL_CORE
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 He stays with various family members and can stay on one floor. He was fully independent PTA.   NO smoking or ETOH history.

## 2021-05-13 NOTE — PROGRESS NOTE ADULT - ASSESSMENT
Groin checks  Pain management   Vascular to follow   Call vascular as needed s/p IVC filter    Please, start AC when is not contraindicated  Follow up in the office in 2 weeks to keep an eye on the filter and repeat v dplx  [phone]

## 2021-05-14 LAB
ALBUMIN SERPL ELPH-MCNC: 3.1 G/DL — LOW (ref 3.5–5.2)
ALP SERPL-CCNC: 80 U/L — SIGNIFICANT CHANGE UP (ref 30–115)
ALT FLD-CCNC: 18 U/L — SIGNIFICANT CHANGE UP (ref 0–41)
ANION GAP SERPL CALC-SCNC: 8 MMOL/L — SIGNIFICANT CHANGE UP (ref 7–14)
AST SERPL-CCNC: 19 U/L — SIGNIFICANT CHANGE UP (ref 0–41)
BILIRUB SERPL-MCNC: 0.5 MG/DL — SIGNIFICANT CHANGE UP (ref 0.2–1.2)
BUN SERPL-MCNC: 23 MG/DL — HIGH (ref 10–20)
CALCIUM SERPL-MCNC: 8.1 MG/DL — LOW (ref 8.5–10.1)
CHLORIDE SERPL-SCNC: 105 MMOL/L — SIGNIFICANT CHANGE UP (ref 98–110)
CO2 SERPL-SCNC: 26 MMOL/L — SIGNIFICANT CHANGE UP (ref 17–32)
COVID-19 SPIKE DOMAIN AB INTERP: POSITIVE
COVID-19 SPIKE DOMAIN ANTIBODY RESULT: >250 U/ML — HIGH
CREAT SERPL-MCNC: 0.7 MG/DL — SIGNIFICANT CHANGE UP (ref 0.7–1.5)
GLUCOSE SERPL-MCNC: 120 MG/DL — HIGH (ref 70–99)
HCT VFR BLD CALC: 33.4 % — LOW (ref 42–52)
HGB BLD-MCNC: 10.3 G/DL — LOW (ref 14–18)
MAGNESIUM SERPL-MCNC: 2.2 MG/DL — SIGNIFICANT CHANGE UP (ref 1.8–2.4)
MCHC RBC-ENTMCNC: 25.1 PG — LOW (ref 27–31)
MCHC RBC-ENTMCNC: 30.8 G/DL — LOW (ref 32–37)
MCV RBC AUTO: 81.3 FL — SIGNIFICANT CHANGE UP (ref 80–94)
NRBC # BLD: 0 /100 WBCS — SIGNIFICANT CHANGE UP (ref 0–0)
PLATELET # BLD AUTO: 199 K/UL — SIGNIFICANT CHANGE UP (ref 130–400)
POTASSIUM SERPL-MCNC: 3.8 MMOL/L — SIGNIFICANT CHANGE UP (ref 3.5–5)
POTASSIUM SERPL-SCNC: 3.8 MMOL/L — SIGNIFICANT CHANGE UP (ref 3.5–5)
PROT SERPL-MCNC: 5.7 G/DL — LOW (ref 6–8)
RBC # BLD: 4.11 M/UL — LOW (ref 4.7–6.1)
RBC # FLD: 14.6 % — HIGH (ref 11.5–14.5)
SARS-COV-2 IGG+IGM SERPL QL IA: >250 U/ML — HIGH
SARS-COV-2 IGG+IGM SERPL QL IA: POSITIVE
SODIUM SERPL-SCNC: 139 MMOL/L — SIGNIFICANT CHANGE UP (ref 135–146)
WBC # BLD: 7.15 K/UL — SIGNIFICANT CHANGE UP (ref 4.8–10.8)
WBC # FLD AUTO: 7.15 K/UL — SIGNIFICANT CHANGE UP (ref 4.8–10.8)

## 2021-05-14 RX ORDER — POLYETHYLENE GLYCOL 3350 17 G/17G
17 POWDER, FOR SOLUTION ORAL
Refills: 0 | Status: DISCONTINUED | OUTPATIENT
Start: 2021-05-14 | End: 2021-05-17

## 2021-05-14 RX ORDER — SENNA PLUS 8.6 MG/1
2 TABLET ORAL AT BEDTIME
Refills: 0 | Status: DISCONTINUED | OUTPATIENT
Start: 2021-05-14 | End: 2021-06-01

## 2021-05-14 RX ORDER — SODIUM CHLORIDE 9 MG/ML
1000 INJECTION, SOLUTION INTRAVENOUS
Refills: 0 | Status: DISCONTINUED | OUTPATIENT
Start: 2021-05-14 | End: 2021-05-17

## 2021-05-14 RX ORDER — SODIUM CHLORIDE 9 MG/ML
1000 INJECTION, SOLUTION INTRAVENOUS
Refills: 0 | Status: DISCONTINUED | OUTPATIENT
Start: 2021-05-14 | End: 2021-05-14

## 2021-05-14 RX ORDER — MAGNESIUM HYDROXIDE 400 MG/1
30 TABLET, CHEWABLE ORAL DAILY
Refills: 0 | Status: DISCONTINUED | OUTPATIENT
Start: 2021-05-14 | End: 2021-06-01

## 2021-05-14 RX ADMIN — Medication 25 MILLIGRAM(S): at 18:07

## 2021-05-14 RX ADMIN — SODIUM CHLORIDE 75 MILLILITER(S): 9 INJECTION, SOLUTION INTRAVENOUS at 13:44

## 2021-05-14 RX ADMIN — Medication 25 MILLIGRAM(S): at 05:41

## 2021-05-14 RX ADMIN — Medication 81 MILLIGRAM(S): at 13:30

## 2021-05-14 RX ADMIN — SENNA PLUS 2 TABLET(S): 8.6 TABLET ORAL at 22:13

## 2021-05-14 RX ADMIN — CHLORHEXIDINE GLUCONATE 1 APPLICATION(S): 213 SOLUTION TOPICAL at 05:41

## 2021-05-14 RX ADMIN — SODIUM CHLORIDE 75 MILLILITER(S): 9 INJECTION, SOLUTION INTRAVENOUS at 16:11

## 2021-05-14 RX ADMIN — AMLODIPINE BESYLATE 5 MILLIGRAM(S): 2.5 TABLET ORAL at 22:13

## 2021-05-14 RX ADMIN — LOSARTAN POTASSIUM 25 MILLIGRAM(S): 100 TABLET, FILM COATED ORAL at 05:41

## 2021-05-14 RX ADMIN — PANTOPRAZOLE SODIUM 40 MILLIGRAM(S): 20 TABLET, DELAYED RELEASE ORAL at 13:30

## 2021-05-14 RX ADMIN — ENOXAPARIN SODIUM 40 MILLIGRAM(S): 100 INJECTION SUBCUTANEOUS at 13:30

## 2021-05-14 RX ADMIN — POLYETHYLENE GLYCOL 3350 17 GRAM(S): 17 POWDER, FOR SOLUTION ORAL at 15:50

## 2021-05-14 RX ADMIN — Medication 20 MILLIGRAM(S): at 15:50

## 2021-05-14 NOTE — PROGRESS NOTE ADULT - ASSESSMENT
Discussed patient with PT and OT and Brain Injury protocol started. Patient is OSP II/Trial modified III for physical therapy. Patient a little more interactive today after a rest break in the afternoon. He answered questions about what he ate and who made it. He however, just echoes whatever is asked. Clinician wrote her name on the board in Minh and he read it out aloud. Family wanted to take him out for a little while and the BI protocol explained. Recommended patient could go out for a brief period of time and to bring him in if confusion or agitation was noted.     Goals: ? Facilitate Positive Coping ? Family Support / Education ? Cognitive Assessment     Plan: 1-2 times a week 30-60 minutes ? Individual Psychotherapy ? Cognitive Assessment ? Family contact/support/education     Brain Injury Protocol: ? Yes   Over Stimulation Precaution (OSP): II Trial OSP: Modified III

## 2021-05-14 NOTE — DIETITIAN NUTRITION RISK NOTIFICATION - TREATMENT: THE FOLLOWING DIET HAS BEEN RECOMMENDED
Diet, Dysphagia 2 Mechanical Soft-Nectar Consistency Fluid:   Lacto Veg (Accepts Milk & Milk Products)     Qty per Day:  VANILLA FLAVOR     Qty per Day:  w/ THICKENER PACKETS  Supplement Feeding Modality:  Oral  Ensure Enlive Servings Per Day:  1       Frequency:  Two Times a day (05-14-21 @ 11:35) [Pending Verification By Attending]  Diet, Dysphagia 2 Mechanical Soft-Nectar Consistency Fluid:   DASH/TLC {Sodium & Cholesterol Restricted}  Lacto Veg (Accepts Milk & Milk Products) (05-13-21 @ 14:32) [Active]      Moderate PCM in the context of acute illness RT dysphagia/decreased appetite sp CVA AEB 1. 5% wt loss within 1.5 weeks 2. <75% estimated kcal requirements >7d    Rec- continue current diet order per SLP recs, consider removing DASH/TLC modifier from diet order r/t decreased po intake, order ensure enlive (vanilla) BID with thickener packets, encourage po intake, provide assistance with meals; family encouraged to continue bringing meals from home per pt preference to maximized po intake

## 2021-05-14 NOTE — PROGRESS NOTE ADULT - ATTENDING COMMENTS
Patient seen, examined and discussed with the resident. I have directed the care. He has a right hemineglect or right sided  inattention. He warrants continued inpatient rehab stay. I have edited the note and plan.

## 2021-05-14 NOTE — PROGRESS NOTE ADULT - ASSESSMENT
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 Good acute rehab candidate.        -lethargy/ Abulia  trial amantadine 100 BID     -Left DVT in the external iliac, common femoral veins  IVC filter placed 5/12  lovenox 40     -Dysphagia: Was on NGT feeds. Just started PO Dysphagia 2 with nectar-thick liquid  feeds. Monitor intake.    - Hypernatremia/ Azotemia: clinically dry. Will give IVF for a few days and monitor    -HTN : sbp goal 120s - 150s  norvasc 10  lopressor 25 bid     -Pain control:   Tylenol PRN    -GI/Bowel Mgmt    pantoprazole 40 mg Oral daily    -Bladder management: condom cath     -Skin:  -No active issues at this time    -FEN: Monitor intake on current diet       Precautions / PROPHYLAXIS:      - Falls    - Ortho: Weight bearing status: all limbs clear      - DVT: enoxaparin Injectable 40 mg SubQ daily and IVC filter     - Seizure proph: Keppra    MEDICAL PROGNOSIS: GOOD            REHAB POTENTIAL: GOOD             ESTIMATED DISPOSITION: HOME WITH HOME CARE       [ x ]  The goals of the IRF admission were discussed with the patient and or family member, who agreed             ELOS:  [     ] 7-14    [ x   ]  14-21    [    ]    Other    THERAPY ORDERS and INITIAL INDIVIDUALIZED PLAN OF CARE:  This initial individualized interdisciplinary plan of care, which was established by me (the attending physiatrist), is based on elements from the post admission evaluation. The interdisciplinary therapy program is to be at least 3 hrs a day, at least 5 days per week from from physical, occupational and/ or speech therapies as ordered by me below.      [ x  ] P.T. 90 mins /day at least 5 out of 7 days:  [  x ] superficial  modalities prn, [ x  ] A/AAROM, [ x  ] PREs, [ x  ] transfer training,            [ x  ] progressive ambulation, [x   ] stairs                                               [ x  ] O.T. 90 mins. /day at least 5 out of 7 days::  [ x  ] modalities prn, [ x  ]A/AAROM, [ x  ] PREs, functional transfer training, [ x  ] ADLs,              [ x  ] cognitive/ perceptual eval and training, [   ] splint eval                                                  [ x  ] S.L.P:  [ x  ] speech eval, [x   ] swallow eval/ f/u    [ x  ] Neuropsychology      [ x  ] Individualized rec. 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 Good acute rehab candidate. He has a right hemineglect or right sided inattention.         -lethargy/ Abulia  trial amantadine 100 BID     -Left DVT in the external iliac, common femoral veins  IVC filter placed 5/12  lovenox 40     -Dysphagia: Was on NGT feeds. Just started PO Dysphagia 2 with nectar-thick liquid  feeds. Monitor intake.    - Hypernatremia/ Azotemia: clinically dry. Will give IVF for a few days and monitor    -HTN : sbp goal 120s - 150s  norvasc 10  lopressor 25 bid     -Pain control:   Tylenol PRN    -GI/Bowel Mgmt    pantoprazole 40 mg Oral daily    -Bladder management: condom cath     -Skin:  -No active issues at this time    -FEN: Monitor intake on current diet       Precautions / PROPHYLAXIS:      - Falls    - Ortho: Weight bearing status: all limbs clear      - DVT: enoxaparin Injectable 40 mg SubQ daily and IVC filter     - Seizure proph: Keppra    MEDICAL PROGNOSIS: GOOD            REHAB POTENTIAL: GOOD             ESTIMATED DISPOSITION: HOME WITH HOME CARE       [ x ]  The goals of the IRF admission were discussed with the patient and or family member, who agreed             ELOS:  [     ] 7-14    [ x   ]  14-21    [    ]    Other    THERAPY ORDERS and INITIAL INDIVIDUALIZED PLAN OF CARE:  This initial individualized interdisciplinary plan of care, which was established by me (the attending physiatrist), is based on elements from the post admission evaluation. The interdisciplinary therapy program is to be at least 3 hrs a day, at least 5 days per week from from physical, occupational and/ or speech therapies as ordered by me below.      [ x  ] P.T. 90 mins /day at least 5 out of 7 days:  [  x ] superficial  modalities prn, [ x  ] A/AAROM, [ x  ] PREs, [ x  ] transfer training,            [ x  ] progressive ambulation, [x   ] stairs                                               [ x  ] O.T. 90 mins. /day at least 5 out of 7 days::  [ x  ] modalities prn, [ x  ]A/AAROM, [ x  ] PREs, functional transfer training, [ x  ] ADLs,              [ x  ] cognitive/ perceptual eval and training, [   ] splint eval                                                  [ x  ] S.L.P:  [ x  ] speech eval, [x   ] swallow eval/ f/u    [ x  ] Neuropsychology      [ x  ] Individualized rec. therapy      RATIONALE FOR INPATIENT ADMISSION - Patient demonstrates the following: (check all that apply)  [X] Medically appropriate for rehabilitation admission  [X] Has attainable rehab goals with an appropriate initial discharge plan  [X] Has rehabilitation potential (expected to make a significant improvement within a reasonable period of time)  [X] Requires close medical management by a rehab physician, rehab nursing care,  and comprehensive interdisciplinary team (including PT, OT)

## 2021-05-14 NOTE — PROGRESS NOTE ADULT - SUBJECTIVE AND OBJECTIVE BOX
Pain: Location: Denied    Ratin/10   Intervention: N/A  Orientation: grossly to self and place     Arousal Level: Alert  Behavior: Pleasant and cooperative  Affect Range: Flat    Needed: Yes    #: Clinician speaks Minh which the patient understands   Attention: ? Impaired- needs frequent redirection to focus on the conversation  Insight into illness/deficits: unable to assess reliably  ?Treatment Session Focused on Mood, Cognition, and Family Contact and Education

## 2021-05-14 NOTE — PROGRESS NOTE ADULT - SUBJECTIVE AND OBJECTIVE BOX
HPI:  86 year old right handed gentleman with no significant PMH presented to the ED with his granddaughter. Patient last known well at 11:30 AM. Patient found to be aphasic with right sided weakness at around noon.   Stroke code and code NI called on arrival. Patient with NIHSS 18. CTH without intracranial pathology.  Patient given IV TPA at 13:23. He was hypertensive on presentation () and was given Labetalol 20 mg IVP and started on nicardipine drip (now off) called for MICU  (03 May 2021 16:17)    Hospital Course:  Stroke code and code NI called on arrival. Patient with NIHSS 18. CTH without intracranial pathology.  Patient given IV TPA at 13:23. 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 Pt placed on seizure ppx  MRI and repeat CT consistent with bifrontal bilateral hemorrhages (without clinical worsening), which are likely 2/2 to hemorrhagic conversion of infarction after administration of TPA. No NSx intervention at this time, as evacuation will not likely improve the patient's functional neurological prognosis. Stability scan shows stable bleed. vEEG overnight negative for subclinical seizures. He was seen by ST and made NPO, fed with NGT.   Pt later devolved pain in R calf and was found to have DVT. Pt is not a candidate for further anticoagulation, so IVC filter was placed 5/12. Pt tolerated procedure well. Pt was revaluated by speech and was advanced to mechanical soft diet with nectar thick fluids. Patient was cleared for Dc to rehab unit.     He is tolerating bedside therapy and was able to stand and ambulate short distances with mod assistance. He was seen by Physiatry on the Stroke service and was found to be a good acute inpatient rehab candidate.      Daily update:  Pt seen in room sitting up dressed with granddaughter at bedside.     Vital Signs Last 24 Hrs  T(C): 36.6 (14 May 2021 05:22), Max: 36.6 (13 May 2021 21:52)  T(F): 97.9 (14 May 2021 05:22), Max: 97.9 (14 May 2021 05:22)  HR: 65 (14 May 2021 05:22) (65 - 80)  BP: 129/58 (14 May 2021 05:22) (102/58 - 143/66)  BP(mean): 81 (13 May 2021 13:20) (81 - 81)  RR: 18 (14 May 2021 05:22) (18 - 18)  SpO2: 100% (13 May 2021 21:52) (98% - 100%)    PHYSICAL EXAMINATION   General: NAD, Resting Comfortable, awake with improved alertness                            HEENT: Poor tracking and fixing. Left gaze preference  Cardio: RRR                              Pulm: No Respiratory Distress,  Lungs CTAB                        Abdomen: ND/NT, Soft                                              MSK: No joint swelling, pain on palpation of R calf. Pain with touching or moving RLE                                       Ext: No C/C/E  Skin: intact, Positive tenting                                                                   Neurological Examination:  Cognitive: [    ] AAO x 3   [ x   ]  other  R hemineglect                                                           Attention:  [    ] intact   [  x  ]  other      poor                      Memory: [     ] intact    [   x ]  other   unable to assess  Mood/Affect:  [    ] wnl    [   x ]  other  flat affect                                                                           Communication:  [  x ]Fluent,  No dysarthria,   [ x   ] other - single word answers with delay, repeats phrases his granddaughter encourages him to say  CN II - XII  [    ] intact   [  x  ] other unable to track to R  Coordination:   [    ] FTN/HTS intact   [ x   ] other  limited exam. Slow response. No ataxia                                                                         Sensory: [ x   ]Intact to light touch   [    ] other                                                                                        Tone:  [  x  ]  wnl,   [    ]  other    Motor   LEFT    UE: [   ] WNL.  [  x ] other: 3/5   RIGHT UE:  [   ] WNL.  [ x  ] other: 3-/5  - positive drift  LEFT    LE:  [   ] WNL.  [ x  ] other: 3/5 poor effort  RIGHT LE:  [   ] WNL.  [  x ] other:  2+/5 range - limited by pain    Reflex:  [  x  ]  symmetric,  [    ]  other:                            10.3   7.15  )-----------( 199      ( 14 May 2021 06:40 )             33.4     05-14    139  |  105  |  23<H>  ----------------------------<  120<H>  3.8   |  26  |  0.7    Ca    8.1<L>      14 May 2021 06:40  Mg     2.2     05-14    TPro  5.7<L>  /  Alb  3.1<L>  /  TBili  0.5  /  DBili  x   /  AST  19  /  ALT  18  /  AlkPhos  80  05-14        POCT Blood Glucose.: 84 mg/dL (05-12-21 @ 18:06)  POCT Blood Glucose.: 93 mg/dL (05-12-21 @ 14:18)  POCT Blood Glucose.: 105 mg/dL (05-12-21 @ 07:23)  POCT Blood Glucose.: 89 mg/dL (05-11-21 @ 22:54)  POCT Blood Glucose.: 108 mg/dL (05-11-21 @ 16:20)  POCT Blood Glucose.: 104 mg/dL (05-11-21 @ 12:03)  POCT Blood Glucose.: 142 mg/dL (05-11-21 @ 06:28)  POCT Blood Glucose.: 102 mg/dL (05-10-21 @ 23:33)  POCT Blood Glucose.: 97 mg/dL (05-10-21 @ 16:45)  POCT Blood Glucose.: 141 mg/dL (05-10-21 @ 11:55)                CT Head No Cont:   EXAM:  CT BRAIN            PROCEDURE DATE:  05/13/2021            INTERPRETATION:  Clinical History / Reason for exam: Intracranial hemorrhage follow-up.    Technique: Noncontrast head CT. Contiguous CT axial images of the head from the base to thevertex.    COMPARISON: CT head 5/10/2021.    FINDINGS/  IMPRESSION:    1.  Slight interval decreased size of the lobar hemorrhages in the bilateral frontal lobes (right larger than left) with stable surrounding edema and mass effect. Moderate interval decreased size of the smaller hemorrhages within the right occipital and left temporal lobes.    2.  Interval decrease in the scattered subarachnoid hemorrhage.    3.  No new intracranial hemorrhage demonstrated.                MARIA LUISA JARQUIN MD; Attending Radiologist  This document has been electronically signed. May 13 2021  1:14PM (05-13-21 @ 13:00)     HPI:  86 year old right handed gentleman with no significant PMH presented to the ED with his granddaughter. Patient last known well at 11:30 AM. Patient found to be aphasic with right sided weakness at around noon.   Stroke code and code NI called on arrival. Patient with NIHSS 18. CTH without intracranial pathology.  Patient given IV TPA at 13:23. He was hypertensive on presentation () and was given Labetalol 20 mg IVP and started on nicardipine drip (now off) called for MICU  (03 May 2021 16:17)    Hospital Course:  Stroke code and code NI called on arrival. Patient with NIHSS 18. CTH without intracranial pathology.  Patient given IV TPA at 13:23. 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 Pt placed on seizure ppx  MRI and repeat CT consistent with bifrontal bilateral hemorrhages (without clinical worsening), which are likely 2/2 to hemorrhagic conversion of infarction after administration of TPA. No NSx intervention at this time, as evacuation will not likely improve the patient's functional neurological prognosis. Stability scan shows stable bleed. vEEG overnight negative for subclinical seizures. He was seen by ST and made NPO, fed with NGT.   Pt later devolved pain in R calf and was found to have DVT. Pt is not a candidate for further anticoagulation, so IVC filter was placed 5/12. Pt tolerated procedure well. Pt was revaluated by speech and was advanced to mechanical soft diet with nectar thick fluids. Patient was cleared for Dc to rehab unit.     He is tolerating bedside therapy and was able to stand and ambulate short distances with mod assistance. He was seen by Physiatry on the Stroke service and was found to be a good acute inpatient rehab candidate.      Daily update:  Pt seen in room sitting up dressed with granddaughter at bedside.     Vital Signs Last 24 Hrs  T(C): 36.6 (14 May 2021 05:22), Max: 36.6 (13 May 2021 21:52)  T(F): 97.9 (14 May 2021 05:22), Max: 97.9 (14 May 2021 05:22)  HR: 65 (14 May 2021 05:22) (65 - 80)  BP: 129/58 (14 May 2021 05:22) (102/58 - 143/66)  BP(mean): 81 (13 May 2021 13:20) (81 - 81)  RR: 18 (14 May 2021 05:22) (18 - 18)  SpO2: 100% (13 May 2021 21:52) (98% - 100%)    PHYSICAL EXAMINATION   General: NAD, Resting Comfortable, awake with improved alertness                            HEENT: Poor tracking and fixing. Left gaze preference  Cardio: RRR                              Pulm: No Respiratory Distress,  Lungs CTAB                        Abdomen: ND/NT, Soft                                              MSK: No joint swelling, pain on palpation of R calf. Pain with touching or moving RLE                                       Ext: No C/C/E  Skin: intact, Positive tenting                                                                   Neurological Examination:  Cognitive: [    ] AAO x 3   [ x   ]  other  R hemineglect                                                           Attention:  [    ] intact   [  x  ]  other      poor                      Memory: [     ] intact    [   x ]  other   unable to assess  Mood/Affect:  [    ] wnl    [   x ]  other  flat affect                                                                           Communication:  [   ]Fluent,  No dysarthria,   [ x   ] other - single word answers with delay, repeats some phrases his granddaughter encourages him to say; he names a pen  CN II - XII  [    ] intact   [  x  ] other unable to track to R  Coordination:   [    ] FTN/HTS intact   [ x   ] other  limited exam. Slow response. No ataxia                                                                         Sensory: [ x   ]Intact to light touch   [    ] other                                                                                        Tone:  [  x  ]  wnl,   [    ]  other    Motor   LEFT    UE: [   ] WNL.  [  x ] other: 3/5   RIGHT UE:  [   ] WNL.  [ x  ] other: 3-/5  - positive drift  LEFT    LE:  [   ] WNL.  [ x  ] other: 3/5 poor effort  RIGHT LE:  [   ] WNL.  [  x ] other:  2+/5 range - limited by pain    Reflex:  [  x  ]  symmetric,  [    ]  other:                            10.3   7.15  )-----------( 199      ( 14 May 2021 06:40 )             33.4     05-14    139  |  105  |  23<H>  ----------------------------<  120<H>  3.8   |  26  |  0.7    Ca    8.1<L>      14 May 2021 06:40  Mg     2.2     05-14    TPro  5.7<L>  /  Alb  3.1<L>  /  TBili  0.5  /  DBili  x   /  AST  19  /  ALT  18  /  AlkPhos  80  05-14        POCT Blood Glucose.: 84 mg/dL (05-12-21 @ 18:06)  POCT Blood Glucose.: 93 mg/dL (05-12-21 @ 14:18)  POCT Blood Glucose.: 105 mg/dL (05-12-21 @ 07:23)  POCT Blood Glucose.: 89 mg/dL (05-11-21 @ 22:54)  POCT Blood Glucose.: 108 mg/dL (05-11-21 @ 16:20)  POCT Blood Glucose.: 104 mg/dL (05-11-21 @ 12:03)  POCT Blood Glucose.: 142 mg/dL (05-11-21 @ 06:28)  POCT Blood Glucose.: 102 mg/dL (05-10-21 @ 23:33)  POCT Blood Glucose.: 97 mg/dL (05-10-21 @ 16:45)  POCT Blood Glucose.: 141 mg/dL (05-10-21 @ 11:55)                CT Head No Cont:   EXAM:  CT BRAIN            PROCEDURE DATE:  05/13/2021            INTERPRETATION:  Clinical History / Reason for exam: Intracranial hemorrhage follow-up.    Technique: Noncontrast head CT. Contiguous CT axial images of the head from the base to thevertex.    COMPARISON: CT head 5/10/2021.    FINDINGS/  IMPRESSION:    1.  Slight interval decreased size of the lobar hemorrhages in the bilateral frontal lobes (right larger than left) with stable surrounding edema and mass effect. Moderate interval decreased size of the smaller hemorrhages within the right occipital and left temporal lobes.    2.  Interval decrease in the scattered subarachnoid hemorrhage.    3.  No new intracranial hemorrhage demonstrated.                MARIA LUISA JARQUIN MD; Attending Radiologist  This document has been electronically signed. May 13 2021  1:14PM (05-13-21 @ 13:00)

## 2021-05-15 LAB
ANION GAP SERPL CALC-SCNC: 9 MMOL/L — SIGNIFICANT CHANGE UP (ref 7–14)
BUN SERPL-MCNC: 17 MG/DL — SIGNIFICANT CHANGE UP (ref 10–20)
CALCIUM SERPL-MCNC: 8.3 MG/DL — LOW (ref 8.5–10.1)
CHLORIDE SERPL-SCNC: 103 MMOL/L — SIGNIFICANT CHANGE UP (ref 98–110)
CO2 SERPL-SCNC: 24 MMOL/L — SIGNIFICANT CHANGE UP (ref 17–32)
CREAT SERPL-MCNC: 0.6 MG/DL — LOW (ref 0.7–1.5)
GLUCOSE SERPL-MCNC: 99 MG/DL — SIGNIFICANT CHANGE UP (ref 70–99)
POTASSIUM SERPL-MCNC: 4.1 MMOL/L — SIGNIFICANT CHANGE UP (ref 3.5–5)
POTASSIUM SERPL-SCNC: 4.1 MMOL/L — SIGNIFICANT CHANGE UP (ref 3.5–5)
SODIUM SERPL-SCNC: 136 MMOL/L — SIGNIFICANT CHANGE UP (ref 135–146)

## 2021-05-15 RX ADMIN — PANTOPRAZOLE SODIUM 40 MILLIGRAM(S): 20 TABLET, DELAYED RELEASE ORAL at 13:16

## 2021-05-15 RX ADMIN — Medication 25 MILLIGRAM(S): at 05:33

## 2021-05-15 RX ADMIN — POLYETHYLENE GLYCOL 3350 17 GRAM(S): 17 POWDER, FOR SOLUTION ORAL at 16:06

## 2021-05-15 RX ADMIN — Medication 81 MILLIGRAM(S): at 13:16

## 2021-05-15 RX ADMIN — ENOXAPARIN SODIUM 40 MILLIGRAM(S): 100 INJECTION SUBCUTANEOUS at 12:16

## 2021-05-15 RX ADMIN — AMLODIPINE BESYLATE 5 MILLIGRAM(S): 2.5 TABLET ORAL at 21:39

## 2021-05-15 RX ADMIN — LOSARTAN POTASSIUM 25 MILLIGRAM(S): 100 TABLET, FILM COATED ORAL at 05:33

## 2021-05-15 RX ADMIN — CHLORHEXIDINE GLUCONATE 1 APPLICATION(S): 213 SOLUTION TOPICAL at 05:33

## 2021-05-15 NOTE — PROGRESS NOTE ADULT - SUBJECTIVE AND OBJECTIVE BOX
T(C): 35.6 (05-15-21 @ 06:09), Max: 36 (05-14-21 @ 14:39)  HR: 57 (05-15-21 @ 06:09) (57 - 69)  BP: 142/65 (05-15-21 @ 06:09) (126/56 - 142/65)  RR: 20 (05-15-21 @ 06:09) (18 - 20)  SpO2: --      Patient was stable overnight and expresses no new complaints     PE:    Alert   LUNGS- clear  COR- RRR  ABD- SOFT, NT  EXTR- w/o edema  NEURO- stable    05-15    136  |  103  |  17  ----------------------------<  99  4.1   |  24  |  0.6<L>    Ca    8.3<L>      15 May 2021 06:45  Mg     2.2     05-14    TPro  5.7<L>  /  Alb  3.1<L>  /  TBili  0.5  /  DBili  x   /  AST  19  /  ALT  18  /  AlkPhos  80  05-14                            10.3   7.15  )-----------( 199      ( 14 May 2021 06:40 )             33.4

## 2021-05-15 NOTE — PROGRESS NOTE ADULT - NUTRITIONAL ASSESSMENT
This patient has been assessed with a concern for Malnutrition and has been determined to have a diagnosis/diagnoses of Moderate protein-calorie malnutrition.    This patient is being managed with:   Diet Dysphagia 2 Mechanical Soft-Nectar Consistency Fluid-  Lacto Veg (Accepts Milk & Milk Products)     Qty per Day:  VANILLA FLAVOR     Qty per Day:  w/ THICKENER PACKETS  Supplement Feeding Modality:  Oral  Ensure Enlive Servings Per Day:  1       Frequency:  Two Times a day  Entered: May 14 2021 11:35AM

## 2021-05-16 RX ADMIN — POLYETHYLENE GLYCOL 3350 17 GRAM(S): 17 POWDER, FOR SOLUTION ORAL at 17:33

## 2021-05-16 RX ADMIN — ENOXAPARIN SODIUM 40 MILLIGRAM(S): 100 INJECTION SUBCUTANEOUS at 13:13

## 2021-05-16 RX ADMIN — LOSARTAN POTASSIUM 25 MILLIGRAM(S): 100 TABLET, FILM COATED ORAL at 06:10

## 2021-05-16 RX ADMIN — Medication 81 MILLIGRAM(S): at 14:08

## 2021-05-16 RX ADMIN — CHLORHEXIDINE GLUCONATE 1 APPLICATION(S): 213 SOLUTION TOPICAL at 06:09

## 2021-05-16 RX ADMIN — Medication 25 MILLIGRAM(S): at 17:33

## 2021-05-16 RX ADMIN — AMLODIPINE BESYLATE 5 MILLIGRAM(S): 2.5 TABLET ORAL at 21:03

## 2021-05-16 RX ADMIN — SENNA PLUS 2 TABLET(S): 8.6 TABLET ORAL at 21:03

## 2021-05-16 RX ADMIN — PANTOPRAZOLE SODIUM 40 MILLIGRAM(S): 20 TABLET, DELAYED RELEASE ORAL at 13:12

## 2021-05-16 RX ADMIN — Medication 25 MILLIGRAM(S): at 06:10

## 2021-05-16 NOTE — PROGRESS NOTE ADULT - SUBJECTIVE AND OBJECTIVE BOX
T(C): 35.1 (05-16-21 @ 05:49), Max: 37.1 (05-15-21 @ 20:44)  HR: 66 (05-16-21 @ 05:49) (56 - 66)  BP: 168/75 (05-16-21 @ 05:49) (103/51 - 168/75)  RR: 18 (05-16-21 @ 05:49) (18 - 18)  SpO2: --      Patient was stable overnight and expresses no new complaints     PE:    Alert   LUNGS- clear  COR- RRR  ABD- SOFT, NT  EXTR- w/o edema  NEURO- stable    05-15    136  |  103  |  17  ----------------------------<  99  4.1   |  24  |  0.6<L>    Ca    8.3<L>      15 May 2021 06:45

## 2021-05-17 LAB
ALBUMIN SERPL ELPH-MCNC: 3.5 G/DL — SIGNIFICANT CHANGE UP (ref 3.5–5.2)
ALP SERPL-CCNC: 103 U/L — SIGNIFICANT CHANGE UP (ref 30–115)
ALT FLD-CCNC: 17 U/L — SIGNIFICANT CHANGE UP (ref 0–41)
ANION GAP SERPL CALC-SCNC: 10 MMOL/L — SIGNIFICANT CHANGE UP (ref 7–14)
AST SERPL-CCNC: 20 U/L — SIGNIFICANT CHANGE UP (ref 0–41)
BILIRUB SERPL-MCNC: 0.4 MG/DL — SIGNIFICANT CHANGE UP (ref 0.2–1.2)
BUN SERPL-MCNC: 15 MG/DL — SIGNIFICANT CHANGE UP (ref 10–20)
CALCIUM SERPL-MCNC: 8.8 MG/DL — SIGNIFICANT CHANGE UP (ref 8.5–10.1)
CHLORIDE SERPL-SCNC: 100 MMOL/L — SIGNIFICANT CHANGE UP (ref 98–110)
CO2 SERPL-SCNC: 26 MMOL/L — SIGNIFICANT CHANGE UP (ref 17–32)
CREAT SERPL-MCNC: 0.8 MG/DL — SIGNIFICANT CHANGE UP (ref 0.7–1.5)
FERRITIN SERPL-MCNC: 158 NG/ML — SIGNIFICANT CHANGE UP (ref 30–400)
FOLATE SERPL-MCNC: 5.3 NG/ML — SIGNIFICANT CHANGE UP
GLUCOSE SERPL-MCNC: 98 MG/DL — SIGNIFICANT CHANGE UP (ref 70–99)
HCT VFR BLD CALC: 36.6 % — LOW (ref 42–52)
HGB BLD-MCNC: 11.7 G/DL — LOW (ref 14–18)
IRON SATN MFR SERPL: 13 % — LOW (ref 15–50)
IRON SATN MFR SERPL: 36 UG/DL — SIGNIFICANT CHANGE UP (ref 35–150)
MAGNESIUM SERPL-MCNC: 2.4 MG/DL — SIGNIFICANT CHANGE UP (ref 1.8–2.4)
MCHC RBC-ENTMCNC: 25 PG — LOW (ref 27–31)
MCHC RBC-ENTMCNC: 32 G/DL — SIGNIFICANT CHANGE UP (ref 32–37)
MCV RBC AUTO: 78.2 FL — LOW (ref 80–94)
NRBC # BLD: 0 /100 WBCS — SIGNIFICANT CHANGE UP (ref 0–0)
PLATELET # BLD AUTO: 256 K/UL — SIGNIFICANT CHANGE UP (ref 130–400)
POTASSIUM SERPL-MCNC: 3.9 MMOL/L — SIGNIFICANT CHANGE UP (ref 3.5–5)
POTASSIUM SERPL-SCNC: 3.9 MMOL/L — SIGNIFICANT CHANGE UP (ref 3.5–5)
PROT SERPL-MCNC: 6.5 G/DL — SIGNIFICANT CHANGE UP (ref 6–8)
RBC # BLD: 4.68 M/UL — LOW (ref 4.7–6.1)
RBC # FLD: 14.6 % — HIGH (ref 11.5–14.5)
SODIUM SERPL-SCNC: 136 MMOL/L — SIGNIFICANT CHANGE UP (ref 135–146)
TIBC SERPL-MCNC: 276 UG/DL — SIGNIFICANT CHANGE UP (ref 220–430)
TSH SERPL-MCNC: 8.32 UIU/ML — HIGH (ref 0.27–4.2)
UIBC SERPL-MCNC: 240 UG/DL — SIGNIFICANT CHANGE UP (ref 110–370)
VIT B12 SERPL-MCNC: 217 PG/ML — LOW (ref 232–1245)
WBC # BLD: 7.68 K/UL — SIGNIFICANT CHANGE UP (ref 4.8–10.8)
WBC # FLD AUTO: 7.68 K/UL — SIGNIFICANT CHANGE UP (ref 4.8–10.8)

## 2021-05-17 RX ADMIN — LOSARTAN POTASSIUM 25 MILLIGRAM(S): 100 TABLET, FILM COATED ORAL at 06:23

## 2021-05-17 RX ADMIN — Medication 81 MILLIGRAM(S): at 13:05

## 2021-05-17 RX ADMIN — ENOXAPARIN SODIUM 40 MILLIGRAM(S): 100 INJECTION SUBCUTANEOUS at 13:04

## 2021-05-17 RX ADMIN — Medication 25 MILLIGRAM(S): at 17:38

## 2021-05-17 RX ADMIN — CHLORHEXIDINE GLUCONATE 1 APPLICATION(S): 213 SOLUTION TOPICAL at 06:21

## 2021-05-17 RX ADMIN — PANTOPRAZOLE SODIUM 40 MILLIGRAM(S): 20 TABLET, DELAYED RELEASE ORAL at 13:05

## 2021-05-17 NOTE — PROGRESS NOTE ADULT - SUBJECTIVE AND OBJECTIVE BOX
HPI:  86 year old right handed gentleman with no significant PMH presented to the ED with his granddaughter. Patient last known well at 11:30 AM. Patient found to be aphasic with right sided weakness at around noon.   Stroke code and code NI called on arrival. Patient with NIHSS 18. CTH without intracranial pathology. Patient given IV TPA at 13:23. He was hypertensive on presentation () and was given Labetalol 20 mg IVP and started on nicardipine drip in MICU  (03 May 2021 16:17). Patient with NIHSS 18. CTH without intracranial pathology.    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 No NSx intervention. Stability scan shows stable bleed. vEEG negative for subclinical seizures. Patient found to have right LE DVT. Pt is not a candidate for further anticoagulation, so IVC filter was placed 5/12. Pt tolerated procedure well. Pt was revaluated by speech and was advanced to mechanical soft diet with nectar thick fluids at that time. He was evaluated by PT, OT and was able to stand and ambulate short distances with mod assistance. He was seen by Physiatry on the Stroke service and was found to be a good acute inpatient rehab candidate.      Today's subjective:  Patient seen and evaluated this morning at bedside with attending physician. Patient reported no new complaints. He is minimally verbal but improving. In NAD , moving all extremities.  CLOF: Patient is mod assist for bed mobility and transfers. Ambulated 100' RW mod assist. Mod assist with upper body dressing.      PHYSICAL EXAMINATION   Vital Signs Last 24 Hrs  T(C): 35.8 (17 May 2021 05:14), Max: 36.1 (16 May 2021 13:02)  T(F): 96.5 (17 May 2021 05:14), Max: 97 (16 May 2021 21:08)  HR: 79 (17 May 2021 05:14) (56 - 80)  BP: 118/56 (17 May 2021 05:14) (118/56 - 152/60)  BP(mean): --  RR: 18 (17 May 2021 05:14) (18 - 18)  SpO2: --      General:[ x  ] NAD, Resting Comfortable,   [   ] other:                                HEENT: [ x  ] NC/AT, EOMI, PERRL , Normal Conjunctivae,   [   ] other:  Cardio: [  x ] RRR, no murmur   [   ] other:                              Pulm: [ x  ] No Respiratory Distress,  Lungs CTAB,   [   ] other:                       Abdomen: [ x  ] ND/NT, Soft,   [   ] other:    : [   x] NO MIR CATHETER, [   ] MIR CATHETER- no meatal tear, no discharge, [   ] other:                                            MSK: [ x  ] No joint swelling, Full ROM,   [  ] other:                                  Ext: [  x ]No C/C/E, No calf tenderness (known right DVT),   [   ]other:    Skin: [   ]intact,   [   ] other:                                                                   Neurological Examination:  Cognitive: [    ] AAO x 3   [ x   ]  other R hemineglect, alert. not answering orientation questions.                                                       Attention:  [    ] intact   [  x  ]  other      poor                      Memory: [     ] intact    [   x ]  other   unable to assess  Mood/Affect:  [    ] wnl    [   x ]  other  flat affect                                                                           Communication:  [   ]Fluent,  No dysarthria,   [ x   ] other - single word answers with delayed responses.   CN II - XII  [    ] intact   [  x  ] other unable to track to R  Coordination:   [    ] FTN/HTS intact   [ x  ] other  limited exam. Slow response. No ataxia                                                                         Sensory: [ x  ]Intact to light touch   [    ] other                                                                                        Tone:  [  x  ]  wnl,   [    ]  other    Motor   LEFT    UE: [   ] WNL.  [  x ] other: 3/5 poor effort  RIGHT UE:  [   ] WNL.  [ x  ] other: 3-/5  poor effort   LEFT    LE:  [   ] WNL.  [ x  ] other: 3/5 poor effort  RIGHT LE:  [   ] WNL.  [  x ] other:  2+/5 range - limited by pain     Reflex:  [  x  ]  symmetric,  [    ]  other:  DTRs: [   ]symmetric, [   ] other:  Coordination:   [    ] intact,   [    ] other:                                                                           Sensory: [    ] Intact to light touch,   [    ] other:                 Recent labs:                        11.7   7.68  )-----------( 256      ( 17 May 2021 06:54 )             36.6     05-17    136  |  100  |  15  ----------------------------<  98  3.9   |  26  |  0.8    Ca    8.8      17 May 2021 06:54  Mg     2.4     05-17    TPro  6.5  /  Alb  3.5  /  TBili  0.4  /  DBili  x   /  AST  20  /  ALT  17  /  AlkPhos  103  05-17        CAPILLARY BLOOD GLUCOSE:  POCT Blood Glucose.: 98 mg/dL  POCT Blood Glucose.: 99 mg/dL  POCT Blood Glucose.: 120 mg/dL  POCT Blood Glucose.: 84 mg/dL (05-12-21 @ 18:06)  POCT Blood Glucose.: 93 mg/dL (05-12-21 @ 14:18)  POCT Blood Glucose.: 105 mg/dL (05-12-21 @ 07:23)  POCT Blood Glucose.: 89 mg/dL (05-11-21 @ 22:54)  POCT Blood Glucose.: 108 mg/dL (05-11-21 @ 16:20)  POCT Blood Glucose.: 104 mg/dL (05-11-21 @ 12:03)  POCT Blood Glucose.: 142 mg/dL (05-11-21 @ 06:28)  POCT Blood Glucose.: 102 mg/dL (05-10-21 @ 23:33)  POCT Blood Glucose.: 97 mg/dL (05-10-21 @ 16:45)  POCT Blood Glucose.: 141 mg/dL (05-10-21 @ 11:55)

## 2021-05-17 NOTE — PROGRESS NOTE ADULT - ASSESSMENT
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 Good acute rehab candidate. right hemineglect or right sided inattention.   Patient requires at least 3hours, 5 days a week, of acute inpatient rehab including PT, OT, and speech.    # lethargy/ Abulia  -amantadine 100 BID     # Left DVT in the external iliac, common femoral veins  -IVC filter placed 5/12  -lovenox 40     # Dysphagia: Was on NGT feeds. Just started PO Dysphagia 2 with nectar-thick liquid  feeds. Monitor intake.    # Hypernatremia/ Azotemia:   - resolved. Continue to monitor    # HTN : SBPgoal 120s - 150s  - norvasc 10  - lopressor 25 bid     #Pain control:   - Tylenol PRN    -GI/Bowel Mgmt    pantoprazole 40 mg Oral daily    -Bladder management: condom cath     -Skin:  -No active issues at this time    -FEN: Monitor intake on current diet       Precautions / PROPHYLAXIS:    - Falls  - Ortho: Weight bearing status: all limbs clear  - DVT: enoxaparin Injectable 40 mg SubQ daily and IVC filter   - Seizure proph: Keppra    MEDICAL PROGNOSIS: GOOD            REHAB POTENTIAL: GOOD             ESTIMATED DISPOSITION: HOME WITH HOME CARE       [ x ]  The goals of the IRF admission were discussed with the patient and or family member, who agreed             ELOS:  [     ] 7-14    [ x   ]  14-21    [    ]    Other    THERAPY ORDERS and INITIAL INDIVIDUALIZED PLAN OF CARE:  This initial individualized interdisciplinary plan of care, which was established by me (the attending physiatrist), is based on elements from the post admission evaluation. The interdisciplinary therapy program is to be at least 3 hrs a day, at least 5 days per week from from physical, occupational and/ or speech therapies as ordered by me below.      [ x  ] P.T. 90 mins /day at least 5 out of 7 days:  [  x ] superficial  modalities prn, [ x  ] A/AAROM, [ x  ] PREs, [ x  ] transfer training,            [ x  ] progressive ambulation, [x   ] stairs                                               [ x  ] O.T. 90 mins. /day at least 5 out of 7 days::  [ x  ] modalities prn, [ x  ]A/AAROM, [ x  ] PREs, functional transfer training, [ x  ] ADLs,              [ x  ] cognitive/ perceptual eval and training, [   ] splint eval                                                  [ x  ] S.L.P:  [ x  ] speech eval, [x   ] swallow eval/ f/u    [ x  ] Neuropsychology      [ x  ] Individualized rec. therapy      RATIONALE FOR INPATIENT ADMISSION - Patient demonstrates the following: (check all that apply)  [X] Medically appropriate for rehabilitation admission  [X] Has attainable rehab goals with an appropriate initial discharge plan  [X] Has rehabilitation potential (expected to make a significant improvement within a reasonable period of time)  [X] Requires close medical management by a rehab physician, rehab nursing care,  and comprehensive interdisciplinary team (including PT, OT)

## 2021-05-17 NOTE — PROGRESS NOTE ADULT - ATTENDING COMMENTS
Patient seen, examined and discussed with the resident. I have directed the care. He has a bilateral CVA. I have edited the note and plan. He continues to require inpatient rehab hospitalization with 3 hours of daily therapies.

## 2021-05-17 NOTE — PROGRESS NOTE ADULT - SUBJECTIVE AND OBJECTIVE BOX
Met with patient's son in his room. Patient was sleeping after therapy and discussed the progress to date cognitively and functionally with son. Discharge planning initiated and son stated that the patient will be going to his home and that they were working on getting Medicaid in [place]. Patient's wife also needs supervision so patient's daughter is doing that currently. Safety and supervision needs discussed. Patient slept well according to the sleep log. Son's wife is also having an embolization procedure currently and he was stressed out. Support provided. Will discuss increasing OSP level with therapists in  [facility].    Goals: ? Facilitate Positive Coping  ? Family Support / Education   ? Cognitive Assessment   Plan: 1-2 times a week 30-60 minutes ? Individual Psychotherapy ? Cognitive Assessment ? Family contact/support/education     Brain Injury Protocol: ? Yes       Over Stimulation Precaution (OSP): II Trial OSP: Modified III;  Sleep Log

## 2021-05-18 LAB — GLUCOSE BLDC GLUCOMTR-MCNC: 116 MG/DL — HIGH (ref 70–99)

## 2021-05-18 RX ORDER — PREGABALIN 225 MG/1
1000 CAPSULE ORAL DAILY
Refills: 0 | Status: COMPLETED | OUTPATIENT
Start: 2021-05-18 | End: 2021-05-25

## 2021-05-18 RX ADMIN — AMLODIPINE BESYLATE 5 MILLIGRAM(S): 2.5 TABLET ORAL at 21:14

## 2021-05-18 RX ADMIN — Medication 25 MILLIGRAM(S): at 17:37

## 2021-05-18 RX ADMIN — CHLORHEXIDINE GLUCONATE 1 APPLICATION(S): 213 SOLUTION TOPICAL at 06:38

## 2021-05-18 RX ADMIN — LOSARTAN POTASSIUM 25 MILLIGRAM(S): 100 TABLET, FILM COATED ORAL at 06:38

## 2021-05-18 RX ADMIN — Medication 25 MILLIGRAM(S): at 06:38

## 2021-05-18 RX ADMIN — SENNA PLUS 2 TABLET(S): 8.6 TABLET ORAL at 21:14

## 2021-05-18 RX ADMIN — PREGABALIN 1000 MICROGRAM(S): 225 CAPSULE ORAL at 17:36

## 2021-05-18 RX ADMIN — Medication 81 MILLIGRAM(S): at 11:02

## 2021-05-18 RX ADMIN — ENOXAPARIN SODIUM 40 MILLIGRAM(S): 100 INJECTION SUBCUTANEOUS at 11:02

## 2021-05-18 RX ADMIN — PANTOPRAZOLE SODIUM 40 MILLIGRAM(S): 20 TABLET, DELAYED RELEASE ORAL at 11:03

## 2021-05-18 NOTE — PROGRESS NOTE ADULT - ATTENDING COMMENTS
Patient seen, examined and discussed with the resident. I have directed the care. I have edited the note and plan. He continues to require inpatient rehabilitation hospitalization with 3 hours of daily therapies. He has suffered a bilateral CVA. He is alert. He has some communication difficulties.

## 2021-05-18 NOTE — PROGRESS NOTE ADULT - SUBJECTIVE AND OBJECTIVE BOX
Patient denied pain. He was oriented to name and place. He was seen with and without the OT. Patient was more alert today and tracking to the left side. He has a right side neglect and needed max cues to turn to right. OT stated that he has been neglecting his right hand also needing cues. Therapy done mostly standing on his right side to cue him. Patient answered some questions with 1-2 words. He wanted to get a report that said that he was doing better. DIscussed with therapists in team to start a memory book to give him feedback about sessions and progress, which he can try to write down.     OT attempted a writing task and patient was extremely distracted. He was discussed in team and OSP changed to Level III/Trial IV. Sleep log showed better sleep. He has not been impulsive. Bowel and Bladder program to be started.    Continue goals and plan 1:1, 1-2 x week 30-60 mins, family contact 30-50 mins as needed.    OSP III/ Trial IV; SLeep Log.

## 2021-05-19 RX ADMIN — ENOXAPARIN SODIUM 40 MILLIGRAM(S): 100 INJECTION SUBCUTANEOUS at 12:55

## 2021-05-19 RX ADMIN — LOSARTAN POTASSIUM 25 MILLIGRAM(S): 100 TABLET, FILM COATED ORAL at 06:06

## 2021-05-19 RX ADMIN — PANTOPRAZOLE SODIUM 40 MILLIGRAM(S): 20 TABLET, DELAYED RELEASE ORAL at 12:56

## 2021-05-19 RX ADMIN — AMLODIPINE BESYLATE 5 MILLIGRAM(S): 2.5 TABLET ORAL at 21:27

## 2021-05-19 RX ADMIN — PREGABALIN 1000 MICROGRAM(S): 225 CAPSULE ORAL at 12:57

## 2021-05-19 RX ADMIN — Medication 81 MILLIGRAM(S): at 12:55

## 2021-05-19 RX ADMIN — SENNA PLUS 2 TABLET(S): 8.6 TABLET ORAL at 21:27

## 2021-05-19 RX ADMIN — Medication 25 MILLIGRAM(S): at 06:06

## 2021-05-19 RX ADMIN — CHLORHEXIDINE GLUCONATE 1 APPLICATION(S): 213 SOLUTION TOPICAL at 06:05

## 2021-05-19 RX ADMIN — Medication 25 MILLIGRAM(S): at 18:05

## 2021-05-19 NOTE — PROGRESS NOTE ADULT - ASSESSMENT
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 Good acute rehab candidate. right hemineglect or right sided inattention.   Patient requires at least 3hours, 5 days a week, of acute inpatient rehab including PT, OT, and speech.    # lethargy/ Abulia  -amantadine 100 BID     # Left DVT in the external iliac, common femoral veins  -IVC filter placed 5/12  -lovenox 40     # Dysphagia: Was on NGT feeds. Just started PO Dysphagia 2 with nectar-thick liquid  feeds. Monitor intake.    # Hypernatremia/ Azotemia:   - resolved. Continue to monitor    # HTN : SBPgoal 120s - 150s  - norvasc 10  - lopressor 25 bid     #Pain control:   - Tylenol PRN    -GI/Bowel Mgmt    pantoprazole 40 mg Oral daily    -Bladder management: condom cath     -Skin:  -No active issues at this time    -FEN: Monitor intake on current diet       Precautions / PROPHYLAXIS:    - Falls  - Ortho: Weight bearing status: all limbs clear  - DVT: enoxaparin Injectable 40 mg SubQ daily and IVC filter   - Seizure proph: Keppra    MEDICAL PROGNOSIS: GOOD            REHAB POTENTIAL: GOOD             ESTIMATED DISPOSITION: HOME WITH HOME CARE       [ x ]  The goals of the IRF admission were discussed with the patient and or family member, who agreed             ELOS:  [     ] 7-14    [ x   ]  14-21    [    ]    Other    THERAPY ORDERS and INITIAL INDIVIDUALIZED PLAN OF CARE:  This initial individualized interdisciplinary plan of care, which was established by me (the attending physiatrist), is based on elements from the post admission evaluation. The interdisciplinary therapy program is to be at least 3 hrs a day, at least 5 days per week from from physical, occupational and/ or speech therapies as ordered by me below.      [ x  ] P.T. 90 mins /day at least 5 out of 7 days:  [  x ] superficial  modalities prn, [ x  ] A/AAROM, [ x  ] PREs, [ x  ] transfer training,            [ x  ] progressive ambulation, [x   ] stairs                                               [ x  ] O.T. 90 mins. /day at least 5 out of 7 days::  [ x  ] modalities prn, [ x  ]A/AAROM, [ x  ] PREs, functional transfer training, [ x  ] ADLs,              [ x  ] cognitive/ perceptual eval and training, [   ] splint eval                                                  [ x  ] S.L.P:  [ x  ] speech eval, [x   ] swallow eval/ f/u    [ x  ] Neuropsychology      [ x  ] Individualized rec. 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 Good acute rehab candidate. right hemineglect or right sided inattention.   Patient requires at least 3hours, 5 days a week, of acute inpatient rehab including PT, OT, and speech.    # lethargy/ Abulia  -amantadine 100 BID, improving    # Left DVT in the external iliac, common femoral veins  -IVC filter placed 5/12  -lovenox 40     # Dysphagia: Was on NGT feeds. Just started PO Dysphagia 2 with nectar-thick liquid  feeds. Monitor intake.    # Hypernatremia/ Azotemia:   - resolved. Continue to monitor    # HTN : SBPgoal 120s - 150s  - norvasc 10  - lopressor 25 bid     #Pain control:   - Tylenol PRN    -GI/Bowel Mgmt    pantoprazole 40 mg Oral daily    -Bladder management: condom cath     -Skin:  -No active issues at this time    -FEN: Monitor intake on current diet       Precautions / PROPHYLAXIS:    - Falls  - Ortho: Weight bearing status: all limbs clear  - DVT: enoxaparin Injectable 40 mg SubQ daily and IVC filter   - Seizure proph: Keppra

## 2021-05-19 NOTE — PROGRESS NOTE ADULT - ATTENDING COMMENTS
I examined the patient with the resident and we discussed the findings and treatment plan. Tolerating the rehab program well. I agree with the findings and treatment plan as above and made corrections as needed. The patient continues to require 3 hrs a day of acute inpatient rehab.   Speech output improving. Increased initiation and command following. Tolerating diet. Cont current treatment plan.

## 2021-05-19 NOTE — PROGRESS NOTE ADULT - ASSESSMENT
Talked to daughter in patient room. Per PCA, patient was agitated the night before, trying to climb out of bed and trying to take his clothes off. Discussed with nursing and therapists and moving patient closer to the nursing station. Talked to patient briefly and he responded to couple of questions appropriately. Eye contact was poor and he was looking to the left side. Attempted to redirect him and ask him about the night before but patient was not able to answer. Patient unable to answer pain questions and no nonverbal signs noted. Affect was flat.     Daughter was in the room and discussed BI protocol with her, current status, rehab stay and discharge plan in detail. Support provided to her as she was tearful with taking care of patient's wife and concern for patient. Talked later to the granddaughter via phone as she is taking care of insurance matters and discharge plan and feedback provided from team.     Goals: ? Facilitate Positive Coping ? Family Support / Education  ? Cognitive Assessment ?  Plan: 1-2 times a week 30-60 minutes ? Individual Psychotherapy ? Cognitive Assessment ? Family contact/support/education   Brain Injury Protocol: ? Yes       Over Stimulation Precaution (OSP): III  Trial OSP: IV;  Sleep Log

## 2021-05-19 NOTE — PROGRESS NOTE ADULT - SUBJECTIVE AND OBJECTIVE BOX
HPI:  86 year old right handed gentleman with no significant PMH presented to the ED with his granddaughter. Patient last known well at 11:30 AM. Patient found to be aphasic with right sided weakness at around noon.   Stroke code and code NI called on arrival. Patient with NIHSS 18. CTH without intracranial pathology. Patient given IV TPA at 13:23. He was hypertensive on presentation () and was given Labetalol 20 mg IVP and started on nicardipine drip in MICU  (03 May 2021 16:17). Patient with NIHSS 18. CTH without intracranial pathology.    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 No NSx intervention. Stability scan shows stable bleed. vEEG negative for subclinical seizures. Patient found to have right LE DVT. Pt is not a candidate for further anticoagulation, so IVC filter was placed 5/12. Pt tolerated procedure well. Pt was revaluated by speech and was advanced to mechanical soft diet with nectar thick fluids at that time. He was evaluated by PT, OT and was able to stand and ambulate short distances with mod assistance. He was seen by Physiatry on the Stroke service and was found to be a good acute inpatient rehab candidate.      Today's subjective:  Patient seen and evaluated this morning at bedside with Dr. Pennington. Patient reported no new complaints. He is minimally verbal but improving. In NAD , moving all extremities.  CLOF: Patient is mod assist for bed mobility and transfers. Ambulated 100' RW mod assist. Mod assist with upper body dressing.      PHYSICAL EXAMINATION    Vital Signs Last 24 Hrs  T(C): 36.1 (19 May 2021 05:53), Max: 36.6 (18 May 2021 22:05)  T(F): 97 (19 May 2021 05:53), Max: 97.8 (18 May 2021 22:05)  HR: 65 (19 May 2021 05:53) (62 - 88)  BP: 149/68 (19 May 2021 05:53) (112/55 - 149/68)  BP(mean): --  ABP: --  ABP(mean): --  RR: 18 (19 May 2021 05:53) (16 - 18)  SpO2: 99% (18 May 2021 21:26) (99% - 99%)        General:[ x  ] NAD, Resting Comfortable,   [   ] other:                                HEENT: [ x  ] NC/AT, EOMI, PERRL , Normal Conjunctivae,   [   ] other:  Cardio: [  x ] RRR, no murmur   [   ] other:                              Pulm: [ x  ] No Respiratory Distress,  Lungs CTAB,   [   ] other:                       Abdomen: [ x  ] ND/NT, Soft,   [   ] other:    : [   x] NO MIR CATHETER, [   ] MIR CATHETER- no meatal tear, no discharge, [   ] other:                                            MSK: [ x  ] No joint swelling, Full ROM,   [  ] other:                                  Ext: [  x ]No C/C/E, No calf tenderness (known right DVT),   [   ]other:    Skin: [ x  ]intact,   [   ] other:                                                                   Neurological Examination:  Cognitive: [    ] AAO x 3   [ x   ]  other R hemineglect, alert. not answering orientation questions.                                                       Attention:  [    ] intact   [  x  ]  other      poor                      Memory: [     ] intact    [   x ]  other   unable to assess  Mood/Affect:  [    ] wnl    [   x ]  other  flat affect                                                                           Communication:  [   ]Fluent,  No dysarthria,   [ x   ] other - single word answers with delayed responses.   CN II - XII  [    ] intact   [  x  ] other unable to track to R  Coordination:   [    ] FTN/HTS intact   [ x  ] other  limited exam. Slow response. No ataxia                                                                         Sensory: [ x  ]Intact to light touch   [    ] other                                                                                        Tone:  [  x  ]  wnl,   [    ]  other    Motor   LEFT    UE: [   ] WNL.  [  x ] other: 3/5 poor effort  RIGHT UE:  [   ] WNL.  [ x  ] other: 3-/5  poor effort   LEFT    LE:  [   ] WNL.  [ x  ] other: 3/5 poor effort  RIGHT LE:  [   ] WNL.  [  x ] other:  2+/5 range - limited by pain     Reflex:  [  x  ]  symmetric,  [    ]  other:  DTRs: [  x ]symmetric, [   ] other:  Coordination:   [    ] intact,   [   x ] other:  difficulty with coordination likely secondary to neglect. left sided preference, difficulty tracking                                                                         Sensory: [  x  ] Intact to light touch,   [    ] other:                 Recent labs:                        11.7   7.68  )-----------( 256      ( 17 May 2021 06:54 )             36.6     05-17    136  |  100  |  15  ----------------------------<  98  3.9   |  26  |  0.8    Ca    8.8      17 May 2021 06:54  Mg     2.4     05-17    TPro  6.5  /  Alb  3.5  /  TBili  0.4  /  DBili  x   /  AST  20  /  ALT  17  /  AlkPhos  103  05-17                   HPI:  86 year old right handed gentleman with no significant PMH presented to the ED with his granddaughter. Patient last known well at 11:30 AM. Patient found to be aphasic with right sided weakness at around noon.   Stroke code and code NI called on arrival. Patient with NIHSS 18. CTH without intracranial pathology. Patient given IV TPA at 13:23. He was hypertensive on presentation () and was given Labetalol 20 mg IVP and started on nicardipine drip in MICU  (03 May 2021 16:17). Patient with NIHSS 18. CTH without intracranial pathology.    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 No NSx intervention. Stability scan shows stable bleed. vEEG negative for subclinical seizures. Patient found to have right LE DVT. Pt is not a candidate for further anticoagulation, so IVC filter was placed 5/12. Pt tolerated procedure well. Pt was revaluated by speech and was advanced to mechanical soft diet with nectar thick fluids at that time. He was evaluated by PT, OT and was able to stand and ambulate short distances with mod assistance. He was seen by Physiatry on the Stroke service and was found to be a good acute inpatient rehab candidate.      Today's subjective:  Patient seen and evaluated this morning at bedside with Dr. Pennington. Patient reported no new complaints. He is minimally verbal but improving. In NAD , moving all extremities.  CLOF: Patient is mod assist for bed mobility and transfers. Ambulated 100' RW mod assist. Mod assist with upper body dressing.      PHYSICAL EXAMINATION    Vital Signs Last 24 Hrs  T(C): 36.1 (19 May 2021 05:53), Max: 36.6 (18 May 2021 22:05)  T(F): 97 (19 May 2021 05:53), Max: 97.8 (18 May 2021 22:05)  HR: 65 (19 May 2021 05:53) (62 - 88)  BP: 149/68 (19 May 2021 05:53) (112/55 - 149/68)  BP(mean): --  ABP: --  ABP(mean): --  RR: 18 (19 May 2021 05:53) (16 - 18)  SpO2: 99% (18 May 2021 21:26) (99% - 99%)        General:[ x  ] NAD, Resting Comfortable,   [   ] other:                                HEENT: [ x  ] NC/AT, EOMI, PERRL , Normal Conjunctivae,   [   ] other:  Cardio: [  x ] RRR, no murmur   [   ] other:                              Pulm: [ x  ] No Respiratory Distress,  Lungs CTAB,   [   ] other:                       Abdomen: [ x  ] ND/NT, Soft,   [   ] other:    : [   x] NO MIR CATHETER, [   ] MIR CATHETER- no meatal tear, no discharge, [   ] other:                                            MSK: [ x  ] No joint swelling, Full ROM,   [  ] other:                                  Ext: [  x ]No C/C/E, No calf tenderness (known right DVT),   [   ]other:    Skin: [ x  ]intact,   [   ] other:                                                                   Neurological Examination:  Cognitive: [    ] AAO x 3   [ x   ]  other R hemineglect, left gaze preference. Poor tracking and fisation.  Alert. Poor initiation but answering questions and follows commands.                                                      Attention:  [    ] intact   [  x  ]  other      poor                      Memory: [     ] intact    [   x ]  other   impaired  Mood/Affect:  [    ] wnl    [   x ]  other  flat affect                                                                           Communication:  [   ]Fluent,  No dysarthria,   [ x   ] other - brief answers with delayed responses.   CN II - XII  [    ] intact   [  x  ] other difficulty to tracking to R  Coordination:   [    ] FTN/HTS intact   [ x  ] other  limited exam. Slow response. No ataxia                                                                         Sensory: [ x  ]Intact to light touch   [    ] other                                                                                        Tone:  [  x  ]  wnl,   [    ]  other    Motor   LEFT    UE: [   ] WNL.  [  x ] other: 3/5 poor effort  RIGHT UE:  [   ] WNL.  [ x  ] other: 3-/5  poor effort   LEFT    LE:  [   ] WNL.  [ x  ] other: 3/5 poor effort  RIGHT LE:  [   ] WNL.  [  x ] other:  2+/5 range - limited by pain     Reflex:  [  x  ]  symmetric,  [    ]  other:  DTRs: [  x ]symmetric, [   ] other:  Coordination:   [    ] intact,   [   x ] other:  difficulty with coordination likely secondary to neglect. left sided preference, difficulty tracking                                                                         Sensory: [  x  ] Intact to light touch,   [    ] other:                 Recent labs:                        11.7   7.68  )-----------( 256      ( 17 May 2021 06:54 )             36.6     05-17    136  |  100  |  15  ----------------------------<  98  3.9   |  26  |  0.8    Ca    8.8      17 May 2021 06:54  Mg     2.4     05-17    TPro  6.5  /  Alb  3.5  /  TBili  0.4  /  DBili  x   /  AST  20  /  ALT  17  /  AlkPhos  103  05-17

## 2021-05-19 NOTE — PROGRESS NOTE ADULT - NUTRITIONAL ASSESSMENT
This patient has been assessed with a concern for Malnutrition and has been determined to have a diagnosis/diagnoses of Moderate protein-calorie malnutrition.    This patient is being managed with:   Diet Dysphagia 2 Mechanical Soft-Nectar Consistency Fluid-  Lacto Veg (Accepts Milk & Milk Products)     Qty per Day:  w/ THICKENER PKTS  Beneprotein ([facility] Only)     Qty per Day:  BID  Supplement Feeding Modality:  Oral  Ensure Enlive Servings Per Day:  1       Frequency:  Two Times a day  Entered: May 18 2021  3:45PM

## 2021-05-20 RX ADMIN — Medication 25 MILLIGRAM(S): at 05:14

## 2021-05-20 RX ADMIN — CHLORHEXIDINE GLUCONATE 1 APPLICATION(S): 213 SOLUTION TOPICAL at 05:14

## 2021-05-20 RX ADMIN — ENOXAPARIN SODIUM 40 MILLIGRAM(S): 100 INJECTION SUBCUTANEOUS at 12:53

## 2021-05-20 RX ADMIN — PANTOPRAZOLE SODIUM 40 MILLIGRAM(S): 20 TABLET, DELAYED RELEASE ORAL at 12:53

## 2021-05-20 RX ADMIN — PREGABALIN 1000 MICROGRAM(S): 225 CAPSULE ORAL at 12:52

## 2021-05-20 RX ADMIN — Medication 3 MILLIGRAM(S): at 21:45

## 2021-05-20 RX ADMIN — Medication 25 MILLIGRAM(S): at 17:34

## 2021-05-20 RX ADMIN — SENNA PLUS 2 TABLET(S): 8.6 TABLET ORAL at 21:45

## 2021-05-20 RX ADMIN — LOSARTAN POTASSIUM 25 MILLIGRAM(S): 100 TABLET, FILM COATED ORAL at 05:14

## 2021-05-20 RX ADMIN — Medication 81 MILLIGRAM(S): at 12:53

## 2021-05-20 NOTE — PROGRESS NOTE ADULT - SUBJECTIVE AND OBJECTIVE BOX
HPI:  86 year old right handed gentleman with no significant PMH presented to the ED with his granddaughter. Patient last known well at 11:30 AM. Patient found to be aphasic with right sided weakness at around noon.   Stroke code and code NI called on arrival. Patient with NIHSS 18. CTH without intracranial pathology. Patient given IV TPA at 13:23. He was hypertensive on presentation () and was given Labetalol 20 mg IVP and started on nicardipine drip in MICU  (03 May 2021 16:17). Patient with NIHSS 18. CTH without intracranial pathology.    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 No NSx intervention. Stability scan shows stable bleed. vEEG negative for subclinical seizures. Patient found to have right LE DVT. Pt is not a candidate for further anticoagulation, so IVC filter was placed 5/12. Pt tolerated procedure well. Pt was revaluated by speech and was advanced to mechanical soft diet with nectar thick fluids at that time. He was evaluated by PT, OT and was able to stand and ambulate short distances with mod assistance. He was seen by Physiatry on the Stroke service and was found to be a good acute inpatient rehab candidate.      Today's subjective:  Patient seen and evaluated this morning at bedside with Dr. Pennington. Patient reported no new complaints. He is minimally verbal but improving. In NAD , moving all extremities.  CLOF: Patient is mod assist for bed mobility and transfers. Ambulated 100' x2 RW mod assist. Mod assist with upper body dressing.      PHYSICAL EXAMINATION   Vital Signs Last 24 Hrs  T(C): 35.9 (20 May 2021 05:28), Max: 35.9 (19 May 2021 13:03)  T(F): 96.7 (20 May 2021 05:28), Max: 96.7 (20 May 2021 05:28)  HR: 57 (20 May 2021 05:28) (57 - 66)  BP: 130/62 (20 May 2021 05:28) (106/56 - 146/64)  RR: 18 (20 May 2021 05:28) (18 - 20)  SpO2: 98% (20 May 2021 05:10) (97% - 99%)      General:[ x  ] NAD, Resting Comfortable,   [   ] other:                                HEENT: [ x  ] NC/AT, EOMI, PERRL , Normal Conjunctivae,   [   ] other:  Cardio: [  x ] RRR, no murmur   [   ] other:                              Pulm: [ x  ] No Respiratory Distress,  Lungs CTAB,   [   ] other:                       Abdomen: [ x  ] ND/NT, Soft,   [   ] other:    : [   x] NO MIR CATHETER, [   ] MIR CATHETER- no meatal tear, no discharge, [   ] other:                                            MSK: [ x  ] No joint swelling, Full ROM,   [  ] other:                                  Ext: [  x ]No C/C/E, No calf tenderness (known right DVT),   [   ]other:    Skin: [ x  ]intact,   [   ] other:                                                                   Neurological Examination:  Cognitive: [    ] AAO x 3   [ x   ]  other R hemineglect, left gaze preference. Poor tracking and fisation.  Alert. Poor initiation but answering questions and follows commands.                                                      Attention:  [    ] intact   [  x  ]  other      poor                      Memory: [     ] intact    [   x ]  other   impaired  Mood/Affect:  [    ] wnl    [   x ]  other  flat affect                                                                           Communication:  [   ]Fluent,  No dysarthria,   [ x   ] other - brief answers with delayed responses.   CN II - XII  [    ] intact   [  x  ] other difficulty to tracking to R  Coordination:   [    ] FTN/HTS intact   [ x  ] other  limited exam. Slow response. No ataxia                                                                         Sensory: [ x  ]Intact to light touch   [    ] other                                                                                        Tone:  [  x  ]  wnl,   [    ]  other    Motor   LEFT    UE: [   ] WNL.  [  x ] other: 3/5 poor effort  RIGHT UE:  [   ] WNL.  [ x  ] other: 3-/5  poor effort   LEFT    LE:  [   ] WNL.  [ x  ] other: 3/5 poor effort  RIGHT LE:  [   ] WNL.  [  x ] other:  2+/5 range - limited by pain     Reflex:  [  x  ]  symmetric,  [    ]  other:  DTRs: [  x ]symmetric, [   ] other:  Coordination:   [    ] intact,   [   x ] other:  difficulty with coordination likely secondary to neglect. left sided preference, difficulty tracking                                                                         Sensory: [  x  ] Intact to light touch,   [    ] other:                 Recent labs:                        11.7   7.68  )-----------( 256      ( 17 May 2021 06:54 )             36.6     05-17    136  |  100  |  15  ----------------------------<  98  3.9   |  26  |  0.8    Ca    8.8      17 May 2021 06:54  Mg     2.4     05-17    TPro  6.5  /  Alb  3.5  /  TBili  0.4  /  DBili  x   /  AST  20  /  ALT  17  /  AlkPhos  103  05-17                   HPI:  86 year old right handed gentleman with no significant PMH presented to the ED with his granddaughter. Patient last known well at 11:30 AM. Patient found to be aphasic with right sided weakness at around noon.   Stroke code and code NI called on arrival. Patient with NIHSS 18. CTH without intracranial pathology. Patient given IV TPA at 13:23. He was hypertensive on presentation () and was given Labetalol 20 mg IVP and started on nicardipine drip in MICU  (03 May 2021 16:17). Patient with NIHSS 18. CTH without intracranial pathology.    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 No NSx intervention. Stability scan shows stable bleed. vEEG negative for subclinical seizures. Patient found to have right LE DVT. Pt is not a candidate for further anticoagulation, so IVC filter was placed 5/12. Pt tolerated procedure well. Pt was revaluated by speech and was advanced to mechanical soft diet with nectar thick fluids at that time. He was evaluated by PT, OT and was able to stand and ambulate short distances with mod assistance. He was seen by Physiatry on the Stroke service and was found to be a good acute inpatient rehab candidate.      Today's subjective:  Patient seen and evaluated this morning at bedside with Dr. Pennington. Patient reported no new complaints. He is minimally verbal but improving. In NAD , moving all extremities.  CLOF: Patient is mod assist for bed mobility and transfers. Ambulated 100' x2 RW mod assist. Mod assist with upper body dressing.      PHYSICAL EXAMINATION   Vital Signs Last 24 Hrs  T(C): 35.9 (20 May 2021 05:28), Max: 35.9 (19 May 2021 13:03)  T(F): 96.7 (20 May 2021 05:28), Max: 96.7 (20 May 2021 05:28)  HR: 57 (20 May 2021 05:28) (57 - 66)  BP: 130/62 (20 May 2021 05:28) (106/56 - 146/64)  RR: 18 (20 May 2021 05:28) (18 - 20)  SpO2: 98% (20 May 2021 05:10) (97% - 99%)      General:[ x  ] NAD, Resting Comfortable,   [   ] other:                                HEENT: [ x  ] NC/AT, EOMI, PERRL , Normal Conjunctivae,   [   ] other:  Cardio: [  x ] RRR, no murmur   [   ] other:                              Pulm: [ x  ] No Respiratory Distress,  Lungs CTAB,   [   ] other:                       Abdomen: [ x  ] ND/NT, Soft,   [   ] other:    : [   x] NO MIR CATHETER, [   ] MIR CATHETER- no meatal tear, no discharge, [   ] other:                                            MSK: [ x  ] No joint swelling, Full ROM,   [  ] other:                                  Ext: [  x ]No C/C/E, No calf tenderness (known right DVT),   [   ]other:    Skin: [ x  ]intact,   [   ] other:                                                                   Neurological Examination:  Cognitive: [    ] AAO x 3   [ x   ]  other R hemineglect, left gaze preference. Poor tracking and fixation.  Alert. Poor initiation but answering questions and follows commands. Initiation of speech and movement improving                                                    Attention:  [    ] intact   [  x  ]  other      poor                      Memory: [     ] intact    [   x ]  other   impaired  Mood/Affect:  [    ] wnl    [   x ]  other  flat affect                                                                        Communication:  [   ]Fluent,  No dysarthria,   [ x   ] other - brief answers with delayed responses.   CN II - XII  [    ] intact   [  x  ] other difficulty to tracking to R  Coordination:   [    ] FTN/HTS intact   [ x  ] other  limited exam. Slow response. No ataxia                                                                         Sensory: [ x  ]Intact to light touch   [    ] other                                                                                        Tone:  [  x  ]  wnl,   [    ]  other    Motor   LEFT    UE: [   ] WNL.  [  x ] other: 3/5 poor effort  RIGHT UE:  [   ] WNL.  [ x  ] other: 3-/5  poor effort   LEFT    LE:  [   ] WNL.  [ x  ] other: 3/5 poor effort  RIGHT LE:  [   ] WNL.  [  x ] other:  2+/5 range - limited by pain     Reflex:  [  x  ]  symmetric,  [    ]  other:  DTRs: [  x ]symmetric, [   ] other:  Coordination:   [    ] intact,   [   x ] other:  difficulty with coordination likely secondary to neglect. left sided preference, difficulty tracking                                                                         Sensory: [  x  ] Intact to light touch,   [    ] other:                 Recent labs:                        11.7   7.68  )-----------( 256      ( 17 May 2021 06:54 )             36.6     05-17    136  |  100  |  15  ----------------------------<  98  3.9   |  26  |  0.8    Ca    8.8      17 May 2021 06:54  Mg     2.4     05-17    TPro  6.5  /  Alb  3.5  /  TBili  0.4  /  DBili  x   /  AST  20  /  ALT  17  /  AlkPhos  103  05-17

## 2021-05-20 NOTE — PROGRESS NOTE ADULT - ATTENDING COMMENTS
I examined the patient with the resident and we discussed the findings and treatment plan. Tolerating the rehab program well. I agree with the findings and treatment plan as above. The patient requires 3 hrs a day of acute inpatient rehab.   No new c/o. More animated and interactive on Amantidine. Neuro o/w stable. VSS. Ambulates with min/ mod assistance.   Eating well. Continue current treatment.

## 2021-05-20 NOTE — PROGRESS NOTE ADULT - ASSESSMENT
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 Good acute rehab candidate. right hemineglect or right sided inattention.   Patient requires at least 3hours, 5 days a week, of acute inpatient rehab including PT, OT, and speech.    # lethargy/ Abulia  -amantadine 100 BID, improving    # Left DVT in the external iliac, common femoral veins  -IVC filter placed 5/12  -lovenox 40     # Dysphagia: Was on NGT feeds. Just started PO Dysphagia 2 with nectar-thick liquid  feeds. Monitor intake.    # Hypernatremia/ Azotemia:   - resolved. Continue to monitor    # HTN : SBPgoal 120s - 150s  - norvasc 10  - lopressor 25 bid     #Pain control:   - Tylenol PRN    -GI/Bowel Mgmt    pantoprazole 40 mg Oral daily    -Bladder management: condom cath     -Skin:  -No active issues at this time    -FEN: Monitor intake on current diet       Precautions / PROPHYLAXIS:    - Falls  - Ortho: Weight bearing status: all limbs clear  - DVT: enoxaparin Injectable 40 mg SubQ daily and IVC filter   - Seizure proph: Keppra     ASSESSMENT/PLAN    Rehab of stroke and bilateral ICH s/p TPA with right hemiparesis,(dominant), severe abulia and cognitive impairment and dysphagia/ language impairment. Good acute rehab candidate. right hemineglect or right sided inattention.   Patient requires at least 3hours, 5 days a week, of acute inpatient rehab including PT, OT, and speech.    # lethargy/ Abulia  -amantadine 100 BID, improving    # Left DVT in the external iliac, common femoral veins  -IVC filter placed 5/12  -lovenox 40     # Dysphagia: Was on NGT feeds. Started PO Dysphagia 2 with nectar-thick liquid  feeds. Monitor intake.    # Hypernatremia/ Azotemia:   - resolved. Continue to monitor    # HTN : SBPgoal 120s - 150s  - norvasc 10  - lopressor 25 bid     #Pain control:   - Tylenol PRN    -GI/Bowel Mgmt    pantoprazole 40 mg Oral daily    -Bladder management: condom cath     -Skin:  -No active issues at this time    -FEN: Monitor intake on current diet       Precautions / PROPHYLAXIS:    - Falls  - Ortho: Weight bearing status: all limbs clear  - DVT: enoxaparin Injectable 40 mg SubQ daily and IVC filter   - Seizure proph: Keppra

## 2021-05-20 NOTE — PROGRESS NOTE ADULT - NUTRITIONAL ASSESSMENT
This patient has been assessed with a concern for Malnutrition and has been determined to have a diagnosis/diagnoses of Moderate protein-calorie malnutrition.    This patient is being managed with:   Diet Dysphagia 2 Mechanical Soft-Nectar Consistency Fluid-  Lacto Veg (Accepts Milk & Milk Products)     Qty per Day:  w/ THICKENER PKTS  Beneprotein ([facility] Only)     Qty per Day:  BID  Supplement Feeding Modality:  Oral  Ensure Enlive Servings Per Day:  1       Frequency:  Two Times a day  Entered: May 18 2021  3:45PM

## 2021-05-21 RX ADMIN — Medication 3 MILLIGRAM(S): at 21:18

## 2021-05-21 RX ADMIN — PREGABALIN 1000 MICROGRAM(S): 225 CAPSULE ORAL at 12:31

## 2021-05-21 RX ADMIN — PANTOPRAZOLE SODIUM 40 MILLIGRAM(S): 20 TABLET, DELAYED RELEASE ORAL at 12:31

## 2021-05-21 RX ADMIN — SENNA PLUS 2 TABLET(S): 8.6 TABLET ORAL at 21:18

## 2021-05-21 RX ADMIN — AMLODIPINE BESYLATE 5 MILLIGRAM(S): 2.5 TABLET ORAL at 21:18

## 2021-05-21 RX ADMIN — ENOXAPARIN SODIUM 40 MILLIGRAM(S): 100 INJECTION SUBCUTANEOUS at 12:31

## 2021-05-21 RX ADMIN — CHLORHEXIDINE GLUCONATE 1 APPLICATION(S): 213 SOLUTION TOPICAL at 05:46

## 2021-05-21 RX ADMIN — Medication 81 MILLIGRAM(S): at 12:31

## 2021-05-21 RX ADMIN — LOSARTAN POTASSIUM 25 MILLIGRAM(S): 100 TABLET, FILM COATED ORAL at 05:46

## 2021-05-21 NOTE — PROGRESS NOTE ADULT - NUTRITIONAL ASSESSMENT
This patient has been assessed with a concern for Malnutrition and has been determined to have a diagnosis/diagnoses of Moderate protein-calorie malnutrition.    This patient is being managed with:   Diet Dysphagia 2 Mechanical Soft-Nectar Consistency Fluid-  Lacto Veg (Accepts Milk & Milk Products)     Qty per Day:  w/ THICKENER PKTS  Beneprotein ([facility] Only)     Qty per Day:  BID  Supplement Feeding Modality:  Oral  Ensure Enlive Servings Per Day:  1       Frequency:  Two Times a day  Entered: May 18 2021  3:45PM

## 2021-05-21 NOTE — PROGRESS NOTE ADULT - ASSESSMENT
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 Good acute rehab candidate. Right hemineglect or right sided inattention.   Patient requires at least 3hours, 5 days a week, of acute inpatient rehab including PT, OT, and speech.    # lethargy/ Abulia  -amantadine 100 BID, improving    # Left DVT in the external iliac, common femoral veins  -IVC filter placed 5/12 due to complication of hemorrhagic conversion of infarction after administration of TPA.   -lovenox 40     # Dysphagia: Dysphagia 2 with nectar-thick liquid  feeds. Monitor intake.    # Hypernatremia/ Azotemia:   - resolved. Continue to monitor    # HTN : SBPgoal 120s - 150s  - norvasc 10  - lopressor 25 bid     #Pain control:   - Tylenol PRN    -GI/Bowel Mgmt    pantoprazole 40 mg Oral daily    -Bladder management: condom cath     -Skin:  -No active issues at this time    -FEN: Monitor intake on current diet       Precautions / PROPHYLAXIS:    - Falls  - Ortho: Weight bearing status: all limbs clear  - DVT: enoxaparin Injectable 40 mg SubQ daily and IVC filter   - Seizure proph: Keppra     ASSESSMENT/PLAN    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 Good acute rehab candidate. Right hemineglect or right sided inattention.   Patient requires at least 3hours, 5 days a week, of acute inpatient rehab including PT, OT, and speech.    # lethargy/ Abulia  -amantadine 100 BID, improving    # Left DVT in the external iliac, common femoral veins  -IVC filter placed 5/12 due to complication of hemorrhagic conversion of infarction after administration of TPA.   -lovenox 40     # Dysphagia: Dysphagia 2 with nectar-thick liquid  feeds. Monitor intake.    # Hypernatremia/ Azotemia:   - resolved. Continue to monitor    # HTN : SBPgoal 120s - 150s  - norvasc 10  - lopressor 25 bid     #B-12 deficient:  -on cyanocobalamin injectable until 5/25/21    #Pain control:   - Tylenol PRN    -GI/Bowel Mgmt    pantoprazole 40 mg Oral daily    -Bladder management: condom cath     -Skin:  -No active issues at this time    -FEN: Monitor intake on current diet       Precautions / PROPHYLAXIS:    - Falls  - Ortho: Weight bearing status: all limbs clear  - DVT: enoxaparin Injectable 40 mg SubQ daily and IVC filter   - Seizure proph: Keppra

## 2021-05-21 NOTE — PROGRESS NOTE ADULT - SUBJECTIVE AND OBJECTIVE BOX
HPI:    86 year old right handed gentleman with no significant PMH presented to the ED with his granddaughter. Patient last known well at 11:30 AM. Patient found to be aphasic with right sided weakness at around noon.   Stroke code and code NI called on arrival. Patient with NIHSS 18. CTH without intracranial pathology. Patient given IV TPA at 13:23. He was hypertensive on presentation () and was given Labetalol 20 mg IVP and started on nicardipine drip in MICU  (03 May 2021 16:17). 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 No NSx intervention. Stability scan shows stable bleed. vEEG negative for subclinical seizures. Patient found to have right LE DVT. Pt is not a candidate for further anticoagulation, so IVC filter was placed 5/12. Pt tolerated procedure well. Pt was revaluated by speech and was advanced to mechanical soft diet with nectar thick fluids at that time. He was evaluated by PT, OT and was able to stand and ambulate short distances with mod assistance. He was seen by Physiatry on the Stroke service and was found to be a good acute inpatient rehab candidate.      Today's subjective:  Patient seen and evaluated this morning at bedside with attending physician. Patient reported no new complaints. He is minimally verbal but  continues to improve. Patient is In NAD, moving all extremities and following most commands with delayed responses. continued right mariama-neglect. This am, RN held metoprolol due to HR in 50's. Patient asymptomatic.      CLOF: Patient is mod assist for bed mobility and transfers. Ambulated 100' x2 RW mod assist. Mod assist with upper body dressing.      PHYSICAL EXAMINATION   Vital Signs Last 24 Hrs  T(C): 35.6 (21 May 2021 05:42), Max: 36.3 (20 May 2021 22:01)  T(F): 96.1 (21 May 2021 05:42), Max: 97.4 (20 May 2021 22:01)  HR: 56 (21 May 2021 08:33) (51 - 60)  BP: 129/62 (21 May 2021 08:33) (101/56 - 146/63)  BP(mean): --  RR: 18 (21 May 2021 05:42) (18 - 18)  SpO2: 98% (20 May 2021 21:42) (98% - 98%)    General:[ x  ] NAD, resting comfortable in bed,   [   ] other:                                HEENT: [ x  ] NC/AT, EOMI, PERRL , Normal Conjunctivae,   [  ] other:   Cardio: [  x ] RRR, no murmur   [   ] other:                              Pulm: [ x  ] No Respiratory Distress,  Lungs CTAB,   [   ] other:                       Abdomen: [ x  ] ND/NT, Soft,   [   ] other:    : [ x ] NO MIR CATHETER, [   ] MIR CATHETER- no meatal tear, no discharge, [   ] other:                                            MSK: [ x  ] No joint swelling, Full ROM,   [  ] other:                                  Ext: [ x ]No C/C/E, No calf tenderness (known right DVT),   [   ]other:    Skin: [ x  ]intact,   [   ] other:                                                                   Neurological Examination:  Cognitive: [    ] AAO x 3   [ x   ]  other Right hemineglect, left gaze preference. Poor tracking and fixation.  Alert buy with continued poor initiation in answering questions and follows commands. Initiation of speech and movement continues to improve.                                                    Attention:  [    ] intact   [  x  ]  other      poor but improving.                      Memory: [     ] intact    [   x ]  other   impaired  Mood/Affect:  [    ] wnl    [   x ]  other  flat affect                                                                        Communication:  [   ] Fluent,  No dysarthria,   [ x   ] other - brief answers with delayed responses.   CN II - XII  [    ] intact   [  x  ] other difficulty to tracking to Right   Coordination:   [    ] FTN/HTS intact   [ x  ] other  limited exam. Slow response. No ataxia                                                                         Sensory: [ x  ]Intact to light touch   [    ] other                                                                                        Tone:  [  x  ]  wnl,   [    ]  other    Motor   LEFT    UE: [   ] WNL.  [  x ] other: 3/5 poor effort  RIGHT UE:  [   ] WNL.  [ x  ] other: 3-/5  poor effort   LEFT    LE:  [   ] WNL.  [ x  ] other: 3/5 poor effort  RIGHT LE:  [   ] WNL.  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 left sided preference, difficulty tracking                                                                         Sensory: [  x  ] Intact to light touch,   [    ] other:                 Recent labs:             no recent labs.                   HPI:    86 year old right handed gentleman with no significant PMH presented to the ED with his granddaughter. Patient last known well at 11:30 AM. Patient found to be aphasic with right sided weakness at around noon.   Stroke code and code NI called on arrival. Patient with NIHSS 18. CTH without intracranial pathology. Patient given IV TPA at 13:23. He was hypertensive on presentation () and was given Labetalol 20 mg IVP and started on nicardipine drip in MICU  (03 May 2021 16:17). 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 No NSx intervention. Stability scan shows stable bleed. vEEG negative for subclinical seizures. Patient found to have right LE DVT. Pt is not a candidate for further anticoagulation, so IVC filter was placed 5/12. Pt tolerated procedure well. Pt was revaluated by speech and was advanced to mechanical soft diet with nectar thick fluids at that time. He was evaluated by PT, OT and was able to stand and ambulate short distances with mod assistance. He was seen by Physiatry on the Stroke service and was found to be a good acute inpatient rehab candidate.      Today's subjective:  Patient seen and evaluated this morning at bedside with attending physician. Patient reported no new complaints. He is minimally verbal but  continues to improve. Patient is In NAD, moving all extremities and following most commands with delayed responses. continued right mariama-neglect. This am, RN held metoprolol due to HR in 50's. Patient asymptomatic.      CLOF: Patient is mod assist for bed mobility and transfers. Ambulated 125' x2 RW mod assist. Mod assist with upper body dressing.      PHYSICAL EXAMINATION   Vital Signs Last 24 Hrs  T(C): 35.6 (21 May 2021 05:42), Max: 36.3 (20 May 2021 22:01)  T(F): 96.1 (21 May 2021 05:42), Max: 97.4 (20 May 2021 22:01)  HR: 56 (21 May 2021 08:33) (51 - 60)  BP: 129/62 (21 May 2021 08:33) (101/56 - 146/63)  BP(mean): --  RR: 18 (21 May 2021 05:42) (18 - 18)  SpO2: 98% (20 May 2021 21:42) (98% - 98%)    General:[ x  ] NAD, resting comfortable in bed,   [   ] other:                                HEENT: [ x  ] NC/AT, EOMI, PERRL , Normal Conjunctivae,   [  ] other:   Cardio: [  x ] RRR, no murmur   [   ] other:                              Pulm: [ x  ] No Respiratory Distress,  Lungs CTAB,   [   ] other:                       Abdomen: [ x  ] ND/NT, Soft,   [   ] other:    : [ x ] NO MIR CATHETER, [   ] MIR CATHETER- no meatal tear, no discharge, [   ] other:                                            MSK: [ x  ] No joint swelling, Full ROM,   [  ] other:                                  Ext: [ x ]No C/C/E, No calf tenderness (known right DVT),   [   ]other:    Skin: [ x  ]intact,   [   ] other:                                                                   Neurological Examination:  Cognitive: [    ] AAO x 3   [ x   ]  other Right hemineglect, left gaze preference. Poor tracking and fixation.  Alert buy with continued poor initiation in answering questions and follows commands. Initiation of speech and movement continues to improve.                                                    Attention:  [    ] intact   [  x  ]  other      poor but improving.                      Memory: [     ] intact    [   x ]  other   impaired  Mood/Affect:  [    ] wnl    [   x ]  other  flat affect                                                                        Communication:  [   ] Fluent,  No dysarthria,   [ x   ] other - brief answers with delayed responses.   CN II - XII  [    ] intact   [  x  ] other difficulty to tracking to Right   Coordination:   [    ] FTN/HTS intact   [ x  ] other  limited exam. Slow response. No ataxia                                                                         Sensory: [ x  ]Intact to light touch   [    ] other                                                                                        Tone:  [  x  ]  wnl,   [    ]  other    Motor   LEFT    UE: [   ] WNL.  [  x ] other: 3/5 poor effort  RIGHT UE:  [   ] WNL.  [ x  ] other: 3-/5  poor effort   LEFT    LE:  [   ] WNL.  [ x  ] other: 3/5 poor effort  RIGHT LE:  [   ] WNL.  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 left sided preference, difficulty tracking                                                                         Sensory: [  x  ] Intact to light touch,   [    ] other:                 Recent labs:             no recent labs.

## 2021-05-21 NOTE — PROGRESS NOTE ADULT - ATTENDING COMMENTS
I examined the patient with the resident and we discussed the findings and treatment plan. Tolerating the rehab program well. I agree with the findings and treatment plan as above. The patient requires 3 hrs a day of acute inpatient rehab.   No new c/o. More animated and interactive on Amantidine. Neuro o/w stable. VSS. Ambulates with min/ mod assistance.   Eating well. Continue current treatment. I examined the patient with the resident and we discussed the findings and treatment plan. Tolerating the rehab program well. I agree with the findings and treatment plan as above which I corrected as indicated.   The patient requires 3 hrs a day of acute inpatient rehab, PT/ OT /ST.  No new c/o. More animated and interactive on Amantidine. Neuro o/w stable. VSS. Metoprolol held for low HR. Consider decreasing.    Ambulates with min/ mod assistance.   Eating well. Continue current treatment.

## 2021-05-22 RX ADMIN — Medication 81 MILLIGRAM(S): at 12:48

## 2021-05-22 RX ADMIN — PANTOPRAZOLE SODIUM 40 MILLIGRAM(S): 20 TABLET, DELAYED RELEASE ORAL at 12:48

## 2021-05-22 RX ADMIN — SENNA PLUS 2 TABLET(S): 8.6 TABLET ORAL at 21:54

## 2021-05-22 RX ADMIN — AMLODIPINE BESYLATE 5 MILLIGRAM(S): 2.5 TABLET ORAL at 21:54

## 2021-05-22 RX ADMIN — Medication 3 MILLIGRAM(S): at 21:54

## 2021-05-22 RX ADMIN — ENOXAPARIN SODIUM 40 MILLIGRAM(S): 100 INJECTION SUBCUTANEOUS at 12:48

## 2021-05-22 RX ADMIN — PREGABALIN 1000 MICROGRAM(S): 225 CAPSULE ORAL at 12:48

## 2021-05-22 RX ADMIN — LOSARTAN POTASSIUM 25 MILLIGRAM(S): 100 TABLET, FILM COATED ORAL at 06:30

## 2021-05-22 NOTE — PROGRESS NOTE ADULT - NUTRITIONAL ASSESSMENT
This patient has been assessed with a concern for Malnutrition and has been determined to have a diagnosis/diagnoses of Moderate protein-calorie malnutrition.    This patient is being managed with:   Diet Dysphagia 2 Mechanical Soft-Nectar Consistency Fluid-  Lacto Veg (Accepts Milk & Milk Products)     Qty per Day:  w/ THICKENER PKTS  Beneprotein ([facility] Only)     Qty per Day:  BID  Supplement Feeding Modality:  Oral  Ensure Enlive Servings Per Day:  1       Frequency:  Two Times a day  Entered: May 18 2021  3:45PM

## 2021-05-22 NOTE — PROGRESS NOTE ADULT - SUBJECTIVE AND OBJECTIVE BOX
MEDICATIONS  (STANDING):  amLODIPine   Tablet 5 milliGRAM(s) Oral at bedtime  aspirin enteric coated 81 milliGRAM(s) Oral daily  cyanocobalamin Injectable 1000 MICROGram(s) SubCutaneous daily  enoxaparin Injectable 40 milliGRAM(s) SubCutaneous daily  losartan 25 milliGRAM(s) Oral daily  pantoprazole   Suspension 40 milliGRAM(s) Oral daily  senna 2 Tablet(s) Oral at bedtime    MEDICATIONS  (PRN):  acetaminophen   Tablet .. 650 milliGRAM(s) Oral every 6 hours PRN Temp greater or equal to 38C (100.4F), Mild Pain (1 - 3)  bisacodyl Suppository 10 milliGRAM(s) Rectal daily PRN Constipation  magnesium hydroxide Suspension 30 milliLiter(s) Oral daily PRN Constipation  melatonin 3 milliGRAM(s) Oral at bedtime PRN Insomnia      Patient was stable overnight and expresses no new complaints.    T(C): 36.1 (05-22-21 @ 05:48), Max: 36.5 (05-21-21 @ 20:53)  HR: 60 (05-22-21 @ 06:29) (60 - 71)  BP: 135/60 (05-22-21 @ 05:48) (115/61 - 135/60)  RR: 18 (05-22-21 @ 05:48) (18 - 20)  SpO2: --      PE:    Alert   LUNGS- clear  COR- RRR S1S2  ABD- SOFT, NT  EXTR- w/o edema  NEURO- stable                      Rehab for    Continue full acute rehab program.    NPO [Canceled note]

## 2021-05-22 NOTE — PROGRESS NOTE ADULT - NUTRITIONAL ASSESSMENT
This patient has been assessed with a concern for Malnutrition and has been determined to have a diagnosis/diagnoses of Moderate protein-calorie malnutrition.    This patient is being managed with:   Diet Dysphagia 2 Mechanical Soft-Nectar Consistency Fluid-  Lacto Veg (Accepts Milk & Milk Products)     Qty per Day:  w/ THICKENER PKTS  Beneprotein ([facility] Only)     Qty per Day:  BID  Supplement Feeding Modality:  Oral  Ensure Enlive Servings Per Day:  1       Frequency:  Two Times a day  Entered: May 18 2021  3:45PM

## 2021-05-22 NOTE — PROGRESS NOTE ADULT - SUBJECTIVE AND OBJECTIVE BOX
MEDICATIONS  (STANDING):  amLODIPine   Tablet 5 milliGRAM(s) Oral at bedtime  aspirin enteric coated 81 milliGRAM(s) Oral daily  cyanocobalamin Injectable 1000 MICROGram(s) SubCutaneous daily  enoxaparin Injectable 40 milliGRAM(s) SubCutaneous daily  losartan 25 milliGRAM(s) Oral daily  pantoprazole   Suspension 40 milliGRAM(s) Oral daily  senna 2 Tablet(s) Oral at bedtime    MEDICATIONS  (PRN):  acetaminophen   Tablet .. 650 milliGRAM(s) Oral every 6 hours PRN Temp greater or equal to 38C (100.4F), Mild Pain (1 - 3)  bisacodyl Suppository 10 milliGRAM(s) Rectal daily PRN Constipation  magnesium hydroxide Suspension 30 milliLiter(s) Oral daily PRN Constipation  melatonin 3 milliGRAM(s) Oral at bedtime PRN Insomnia      Patient was stable overnight and expresses no new complaints. 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 MEDICATIONS  (STANDING):  amLODIPine   Tablet 5 milliGRAM(s) Oral at bedtime  aspirin enteric coated 81 milliGRAM(s) Oral daily  cyanocobalamin Injectable 1000 MICROGram(s) SubCutaneous daily  enoxaparin Injectable 40 milliGRAM(s) SubCutaneous daily  losartan 25 milliGRAM(s) Oral daily  pantoprazole   Suspension 40 milliGRAM(s) Oral daily  senna 2 Tablet(s) Oral at bedtime    MEDICATIONS  (PRN):  acetaminophen   Tablet .. 650 milliGRAM(s) Oral every 6 hours PRN Temp greater or equal to 38C (100.4F), Mild Pain (1 - 3)  bisacodyl Suppository 10 milliGRAM(s) Rectal daily PRN Constipation  magnesium hydroxide Suspension 30 milliLiter(s) Oral daily PRN Constipation  melatonin 3 milliGRAM(s) Oral at bedtime PRN Insomnia      Patient was stable overnight and expresses no new complaints. Feeling "better."    T(C): 36.1 (05-22-21 @ 05:48), Max: 36.5 (05-21-21 @ 20:53)  HR: 60 (05-22-21 @ 06:29) (60 - 71)  BP: 135/60 (05-22-21 @ 05:48) (115/61 - 135/60)  RR: 18 (05-22-21 @ 05:48) (18 - 20)  SpO2: --      PE:    Alert   LUNGS- clear  COR- RRR S1S2  ABD- SOFT, NT  EXTR- w/o edema  NEURO- stable                      Rehab for stroke and bilateral ICH s/p TPA with right hemiparesis (dominant), severe abulia and cognitive impairment and dysphagia/ language impairment.       Continue full acute rehab program.

## 2021-05-23 RX ADMIN — Medication 81 MILLIGRAM(S): at 12:50

## 2021-05-23 RX ADMIN — PREGABALIN 1000 MICROGRAM(S): 225 CAPSULE ORAL at 12:50

## 2021-05-23 RX ADMIN — SENNA PLUS 2 TABLET(S): 8.6 TABLET ORAL at 21:20

## 2021-05-23 RX ADMIN — AMLODIPINE BESYLATE 5 MILLIGRAM(S): 2.5 TABLET ORAL at 21:20

## 2021-05-23 RX ADMIN — PANTOPRAZOLE SODIUM 40 MILLIGRAM(S): 20 TABLET, DELAYED RELEASE ORAL at 12:51

## 2021-05-23 RX ADMIN — LOSARTAN POTASSIUM 25 MILLIGRAM(S): 100 TABLET, FILM COATED ORAL at 06:29

## 2021-05-23 RX ADMIN — ENOXAPARIN SODIUM 40 MILLIGRAM(S): 100 INJECTION SUBCUTANEOUS at 12:50

## 2021-05-23 RX ADMIN — Medication 3 MILLIGRAM(S): at 21:20

## 2021-05-23 NOTE — PROGRESS NOTE ADULT - SUBJECTIVE AND OBJECTIVE BOX
MEDICATIONS  (STANDING):  amLODIPine   Tablet 5 milliGRAM(s) Oral at bedtime  aspirin enteric coated 81 milliGRAM(s) Oral daily  cyanocobalamin Injectable 1000 MICROGram(s) SubCutaneous daily  enoxaparin Injectable 40 milliGRAM(s) SubCutaneous daily  losartan 25 milliGRAM(s) Oral daily  pantoprazole   Suspension 40 milliGRAM(s) Oral daily  senna 2 Tablet(s) Oral at bedtime    MEDICATIONS  (PRN):  acetaminophen   Tablet .. 650 milliGRAM(s) Oral every 6 hours PRN Temp greater or equal to 38C (100.4F), Mild Pain (1 - 3)  bisacodyl Suppository 10 milliGRAM(s) Rectal daily PRN Constipation  magnesium hydroxide Suspension 30 milliLiter(s) Oral daily PRN Constipation  melatonin 3 milliGRAM(s) Oral at bedtime PRN Insomnia      Patient was stable overnight and expresses no new complaints.    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    Patient encouraged to keep wheelchair seat belt fastened

## 2021-05-23 NOTE — PROGRESS NOTE ADULT - NUTRITIONAL ASSESSMENT
This patient has been assessed with a concern for Malnutrition and has been determined to have a diagnosis/diagnoses of Moderate protein-calorie malnutrition.    This patient is being managed with:   Diet Dysphagia 2 Mechanical Soft-Nectar Consistency Fluid-  Lacto Veg (Accepts Milk & Milk Products)     Qty per Day:  w/ THICKENER PKTS  Beneprotein ([facility] Only)     Qty per Day:  BID  Supplement Feeding Modality:  Oral  Ensure Enlive Servings Per Day:  1       Frequency:  Two Times a day  Entered: May 18 2021  3:45PM

## 2021-05-24 LAB
ALBUMIN SERPL ELPH-MCNC: 3.8 G/DL — SIGNIFICANT CHANGE UP (ref 3.5–5.2)
ALP SERPL-CCNC: 110 U/L — SIGNIFICANT CHANGE UP (ref 30–115)
ALT FLD-CCNC: 17 U/L — SIGNIFICANT CHANGE UP (ref 0–41)
ANION GAP SERPL CALC-SCNC: 10 MMOL/L — SIGNIFICANT CHANGE UP (ref 7–14)
AST SERPL-CCNC: 23 U/L — SIGNIFICANT CHANGE UP (ref 0–41)
BILIRUB SERPL-MCNC: 0.3 MG/DL — SIGNIFICANT CHANGE UP (ref 0.2–1.2)
BUN SERPL-MCNC: 12 MG/DL — SIGNIFICANT CHANGE UP (ref 10–20)
CALCIUM SERPL-MCNC: 9.2 MG/DL — SIGNIFICANT CHANGE UP (ref 8.5–10.1)
CHLORIDE SERPL-SCNC: 98 MMOL/L — SIGNIFICANT CHANGE UP (ref 98–110)
CO2 SERPL-SCNC: 28 MMOL/L — SIGNIFICANT CHANGE UP (ref 17–32)
CREAT SERPL-MCNC: 0.9 MG/DL — SIGNIFICANT CHANGE UP (ref 0.7–1.5)
GLUCOSE SERPL-MCNC: 93 MG/DL — SIGNIFICANT CHANGE UP (ref 70–99)
HCT VFR BLD CALC: 36.6 % — LOW (ref 42–52)
HGB BLD-MCNC: 11.4 G/DL — LOW (ref 14–18)
MAGNESIUM SERPL-MCNC: 2.4 MG/DL — SIGNIFICANT CHANGE UP (ref 1.8–2.4)
MCHC RBC-ENTMCNC: 24.8 PG — LOW (ref 27–31)
MCHC RBC-ENTMCNC: 31.1 G/DL — LOW (ref 32–37)
MCV RBC AUTO: 79.6 FL — LOW (ref 80–94)
NRBC # BLD: 0 /100 WBCS — SIGNIFICANT CHANGE UP (ref 0–0)
PLATELET # BLD AUTO: 328 K/UL — SIGNIFICANT CHANGE UP (ref 130–400)
POTASSIUM SERPL-MCNC: 4.6 MMOL/L — SIGNIFICANT CHANGE UP (ref 3.5–5)
POTASSIUM SERPL-SCNC: 4.6 MMOL/L — SIGNIFICANT CHANGE UP (ref 3.5–5)
PROT SERPL-MCNC: 6.3 G/DL — SIGNIFICANT CHANGE UP (ref 6–8)
RBC # BLD: 4.6 M/UL — LOW (ref 4.7–6.1)
RBC # FLD: 15.7 % — HIGH (ref 11.5–14.5)
SODIUM SERPL-SCNC: 136 MMOL/L — SIGNIFICANT CHANGE UP (ref 135–146)
T4 FREE SERPL-MCNC: 1.2 NG/DL — SIGNIFICANT CHANGE UP (ref 0.9–1.8)
TSH SERPL-MCNC: 9.08 UIU/ML — HIGH (ref 0.27–4.2)
WBC # BLD: 5.97 K/UL — SIGNIFICANT CHANGE UP (ref 4.8–10.8)
WBC # FLD AUTO: 5.97 K/UL — SIGNIFICANT CHANGE UP (ref 4.8–10.8)

## 2021-05-24 RX ORDER — CITALOPRAM 10 MG/1
20 TABLET, FILM COATED ORAL DAILY
Refills: 0 | Status: DISCONTINUED | OUTPATIENT
Start: 2021-05-24 | End: 2021-05-24

## 2021-05-24 RX ORDER — ESCITALOPRAM OXALATE 10 MG/1
10 TABLET, FILM COATED ORAL AT BEDTIME
Refills: 0 | Status: DISCONTINUED | OUTPATIENT
Start: 2021-05-24 | End: 2021-05-27

## 2021-05-24 RX ADMIN — LOSARTAN POTASSIUM 25 MILLIGRAM(S): 100 TABLET, FILM COATED ORAL at 05:47

## 2021-05-24 RX ADMIN — PREGABALIN 1000 MICROGRAM(S): 225 CAPSULE ORAL at 12:16

## 2021-05-24 RX ADMIN — SENNA PLUS 2 TABLET(S): 8.6 TABLET ORAL at 21:12

## 2021-05-24 RX ADMIN — ENOXAPARIN SODIUM 40 MILLIGRAM(S): 100 INJECTION SUBCUTANEOUS at 12:16

## 2021-05-24 RX ADMIN — PANTOPRAZOLE SODIUM 40 MILLIGRAM(S): 20 TABLET, DELAYED RELEASE ORAL at 12:16

## 2021-05-24 RX ADMIN — ESCITALOPRAM OXALATE 10 MILLIGRAM(S): 10 TABLET, FILM COATED ORAL at 21:19

## 2021-05-24 RX ADMIN — AMLODIPINE BESYLATE 5 MILLIGRAM(S): 2.5 TABLET ORAL at 21:12

## 2021-05-24 RX ADMIN — Medication 81 MILLIGRAM(S): at 12:16

## 2021-05-24 NOTE — PROGRESS NOTE ADULT - ASSESSMENT
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 Good acute rehab candidate. Right hemineglect or right sided inattention.   Patient requires at least 3hours, 5 days a week, of acute inpatient rehab including PT, OT, and speech.    # lethargy/ Abulia  -amantadine 100 BID, improving    # Left DVT in the external iliac, common femoral veins  -IVC filter placed 5/12 due to complication of hemorrhagic conversion of infarction after administration of TPA.   -lovenox 40     # Dysphagia: Dysphagia 2 with nectar-thick liquid  feeds. Monitor intake.    # Hypernatremia/ Azotemia:   - resolved. Continue to monitor    # HTN : SBPgoal 120s - 150s  - norvasc 10  - lopressor 25 bid     #B-12 deficient:  -on cyanocobalamin injectable until 5/25/21    #Pain control:   - Tylenol PRN    -GI/Bowel Mgmt    pantoprazole 40 mg Oral daily    -Bladder management: condom cath     -Skin:  -No active issues at this time    -FEN: Monitor intake on current diet       Precautions / PROPHYLAXIS:    - Falls  - Ortho: Weight bearing status: all limbs clear  - DVT: enoxaparin Injectable 40 mg SubQ daily and IVC filter   - Seizure proph: Keppra     ASSESSMENT/PLAN    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 Good acute rehab candidate. Right hemineglect or right sided inattention.   Patient requires at least 3hours, 5 days a week, of acute inpatient rehab including PT, OT, and speech.    # lethargy/ Abulia  -amantadine 100 BID, improving    #Dysthymia  -start low dose Lexapro    # Left DVT in the external iliac, common femoral veins  -IVC filter placed 5/12 due to complication of hemorrhagic conversion of infarction after administration of TPA. Not an A/C candidate  -lovenox 40     # Dysphagia: Dysphagia 2 with nectar-thick liquid  feeds. Monitor intake.    # HTN : SBPgoal 120s - 150s  - Stable  - norvasc 10  - lopressor 25 bid     #B-12 deficient:  -on cyanocobalamin injectable until 5/25/21    #Pain control:   - Tylenol PRN    -GI/Bowel Mgmt    pantoprazole 40 mg Oral daily    -Bladder management: condom cath     -Skin:  -No active issues at this time    -FEN: Monitor intake on current diet       Precautions / PROPHYLAXIS:    - Falls  - Ortho: Weight bearing status: all limbs clear  - DVT: enoxaparin Injectable 40 mg SubQ daily and IVC filter   - Seizure proph: Keppra

## 2021-05-24 NOTE — PROGRESS NOTE ADULT - NUTRITIONAL ASSESSMENT
This patient has been assessed with a concern for Malnutrition and has been determined to have a diagnosis/diagnoses of Moderate protein-calorie malnutrition.    This patient is being managed with:   Diet Dysphagia 2 Mechanical Soft-Nectar Consistency Fluid-  Lacto Veg (Accepts Milk & Milk Products)     Qty per Day:  w/ THICKENER PKTS  Beneprotein ([facility] Only)     Qty per Day:  BID  Supplement Feeding Modality:  Oral  Ensure Enlive Servings Per Day:  1       Frequency:  Two Times a day  Entered: May 18 2021  3:45PM

## 2021-05-24 NOTE — PROGRESS NOTE ADULT - SUBJECTIVE AND OBJECTIVE BOX
HPI:    86 year old right handed gentleman with no significant PMH presented to the ED with his granddaughter. Patient last known well at 11:30 AM. Patient found to be aphasic with right sided weakness at around noon.   Stroke code and code NI called on arrival. Patient with NIHSS 18. CTH without intracranial pathology. Patient given IV TPA at 13:23. He was hypertensive on presentation () and was given Labetalol 20 mg IVP and started on nicardipine drip in MICU  (03 May 2021 16:17). 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 No NSx intervention. Stability scan shows stable bleed. vEEG negative for subclinical seizures. Patient found to have right LE DVT. Pt is not a candidate for further anticoagulation, so IVC filter was placed 5/12. Pt tolerated procedure well. Pt was revaluated by speech and was advanced to mechanical soft diet with nectar thick fluids at that time. He was evaluated by PT, OT and was able to stand and ambulate short distances with mod assistance. He was seen by Physiatry on the Stroke service and was found to be a good acute inpatient rehab candidate.      Today's subjective:  Patient seen and evaluated this morning at bedside with attending physician. Patient reported no new complaints. He is minimally verbal but  continues to improve. Patient is In NAD, moving all extremities and following most commands with delayed responses.       CLOF: Patient is mod assist for bed mobility and transfers. Ambulated 125' x2 RW mod assist. Mod assist with upper body dressing.      PHYSICAL EXAMINATION   Vital Signs Last 24 Hrs  T(C): 35.7 (24 May 2021 07:16), Max: 35.9 (23 May 2021 20:50)  T(F): 96.2 (24 May 2021 07:16), Max: 96.7 (23 May 2021 20:50)  HR: 62 (24 May 2021 07:16) (62 - 88)  BP: 142/60 (24 May 2021 07:16) (111/55 - 142/60)  RR: 18 (24 May 2021 07:16) (18 - 18)  SpO2: 100% (24 May 2021 07:16) (100% - 100%)      General:[ x  ] NAD, resting comfortable in bed,   [   ] other:                                HEENT: [ x  ] NC/AT, EOMI, PERRL , Normal Conjunctivae,   [  ] other:   Cardio: [  x ] RRR, no murmur   [   ] other:                              Pulm: [ x  ] No Respiratory Distress,  Lungs CTAB,   [   ] other:                       Abdomen: [ x  ] ND/NT, Soft,   [   ] other:    : [ x ] NO MIR CATHETER, [   ] MIR CATHETER- no meatal tear, no discharge, [   ] other:                                            MSK: [ x  ] No joint swelling, Full ROM,   [  ] other:                                  Ext: [ x ]No C/C/E, No calf tenderness (known right DVT),   [   ]other:    Skin: [ x  ]intact,   [   ] other:                                                                   Neurological Examination:  Cognitive: [    ] AAO x 3   [ x   ]  other Right hemineglect, left gaze preference. Poor tracking and fixation.  Alert buy with continued poor initiation in answering questions and follows commands. Initiation of speech and movement continues to improve.                                                    Attention:  [    ] intact   [  x  ]  other      poor but improving.                      Memory: [     ] intact    [   x ]  other   impaired  Mood/Affect:  [    ] wnl    [   x ]  other  flat affect                                                                        Communication:  [   ] Fluent,  No dysarthria,   [ x   ] other - brief answers with delayed responses.   CN II - XII  [    ] intact   [  x  ] other difficulty to tracking to Right   Coordination:   [    ] FTN/HTS intact   [ x  ] other  limited exam. Slow response. No ataxia                                                                         Sensory: [ x  ]Intact to light touch   [    ] other                                                                                        Tone:  [  x  ]  wnl,   [    ]  other    Motor   LEFT    UE: [   ] WNL.  [  x ] other: 3/5 poor effort  RIGHT UE:  [   ] WNL.  [ x  ] other: 3-/5  poor effort   LEFT    LE:  [   ] WNL.  [ x  ] other: 3/5 poor effort  RIGHT LE:  [   ] WNL.  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 left sided preference, difficulty tracking                                                                         Sensory: [  x  ] Intact to light touch,   [    ] other:                 Recent labs:                                   11.4   5.97  )-----------( 328      ( 24 May 2021 06:21 )             36.6     MEDICATIONS  (STANDING):  amLODIPine   Tablet 5 milliGRAM(s) Oral at bedtime  aspirin enteric coated 81 milliGRAM(s) Oral daily  cyanocobalamin Injectable 1000 MICROGram(s) SubCutaneous daily  enoxaparin Injectable 40 milliGRAM(s) SubCutaneous daily  losartan 25 milliGRAM(s) Oral daily  pantoprazole   Suspension 40 milliGRAM(s) Oral daily  senna 2 Tablet(s) Oral at bedtime    MEDICATIONS  (PRN):  acetaminophen   Tablet .. 650 milliGRAM(s) Oral every 6 hours PRN Temp greater or equal to 38C (100.4F), Mild Pain (1 - 3)  bisacodyl Suppository 10 milliGRAM(s) Rectal daily PRN Constipation  magnesium hydroxide Suspension 30 milliLiter(s) Oral daily PRN Constipation  melatonin 3 milliGRAM(s) Oral at bedtime PRN Insomnia                                 HPI:    86 year old right handed gentleman with no significant PMH presented to the ED with his granddaughter. Patient last known well at 11:30 AM. Patient found to be aphasic with right sided weakness at around noon.   Stroke code and code NI called on arrival. Patient with NIHSS 18. CTH without intracranial pathology. Patient given IV TPA at 13:23. He was hypertensive on presentation () and was given Labetalol 20 mg IVP and started on nicardipine drip in MICU  (03 May 2021 16:17). 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 No NSx intervention. Stability scan shows stable bleed. vEEG negative for subclinical seizures. Patient found to have right LE DVT. Pt is not a candidate for further anticoagulation, so IVC filter was placed 5/12. Pt tolerated procedure well. Pt was revaluated by speech and was advanced to mechanical soft diet with nectar thick fluids at that time. He was evaluated by PT, OT and was able to stand and ambulate short distances with mod assistance. He was seen by Physiatry on the Stroke service and was found to be a good acute inpatient rehab candidate.      Today's subjective:  Patient seen and evaluated this morning at bedside with attending physician. Patient reported no new complaints. He is minimally verbal but  continues to improve. Patient is In NAD, moving all extremities and following most commands with delayed responses.       CLOF: Patient is mod assist for bed mobility and transfers. Ambulated 125' x2 RW mod assist. Mod assist with upper body dressing.      PHYSICAL EXAMINATION   Vital Signs Last 24 Hrs  T(C): 35.7 (24 May 2021 07:16), Max: 35.9 (23 May 2021 20:50)  T(F): 96.2 (24 May 2021 07:16), Max: 96.7 (23 May 2021 20:50)  HR: 62 (24 May 2021 07:16) (62 - 88)  BP: 142/60 (24 May 2021 07:16) (111/55 - 142/60)  RR: 18 (24 May 2021 07:16) (18 - 18)  SpO2: 100% (24 May 2021 07:16) (100% - 100%)      General:[ x  ] NAD, resting comfortable in bed,   [   ] other:                                HEENT: [ x  ] NC/AT, EOMI, PERRL , Normal Conjunctivae,   [  ] other:   Cardio: [  x ] RRR, no murmur   [   ] other:                              Pulm: [ x  ] No Respiratory Distress,  Lungs CTAB,   [   ] other:                       Abdomen: [ x  ] ND/NT, Soft,   [   ] other:    : [ x ] NO MIR CATHETER, [   ] MIR CATHETER- no meatal tear, no discharge, [   ] other:                                            MSK: [ x  ] No joint swelling, Full ROM,   [  ] other:                                  Ext: [ x ]No C/C/E, No calf tenderness (known right DVT),   [   ]other:    Skin: [ x  ]intact,   [   ] other:                                                                   Neurological Examination:  Cognitive: [    ] AAO x 3   [ x   ]  other Right hemineglect, left gaze preference. Poor tracking and fixation.  Alert buy with continued poor initiation in answering questions and follows commands. Initiation of speech and movement continues to improve.                                                    Attention:  [    ] intact   [  x  ]  other      poor but improving.                      Memory: [     ] intact    [   x ]  other   impaired  Mood/Affect:  [    ] wnl    [   x ]  other  flat affect                                                                        Communication:  [   ] Fluent,  No dysarthria,   [ x   ] other - brief answers with delayed responses.   CN II - XII  [    ] intact   [  x  ] other difficulty to tracking to Right   Coordination:   [    ] FTN/HTS intact   [ x  ] other  limited exam. Slow response. No ataxia                                                                         Sensory: [ x  ]Intact to light touch   [    ] other                                                                                        Tone:  [  x  ]  wnl,   [    ]  other    Motor   LEFT    UE: [   ] WNL.  [  x ] other: 3/5 poor effort  RIGHT UE:  [   ] WNL.  [ x  ] other: 3-/5  poor effort   LEFT    LE:  [   ] WNL.  [ x  ] other: 3/5 poor effort  RIGHT LE:  [   ] WNL.  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 left sided preference, difficulty tracking                                                                         Sensory: [  x  ] Intact to light touch,   [    ] other:                 Recent labs:                                    11.4   5.97  )-----------( 328      ( 24 May 2021 06:21 )             36.6     05-24    136  |  98  |  12  ----------------------------<  93  4.6   |  28  |  0.9    Ca    9.2      24 May 2021 06:21  Mg     2.4     05-24    TPro  6.3  /  Alb  3.8  /  TBili  0.3  /  DBili  x   /  AST  23  /  ALT  17  /  AlkPhos  110  05-24                MEDICATIONS  (STANDING):  amLODIPine   Tablet 5 milliGRAM(s) Oral at bedtime  aspirin enteric coated 81 milliGRAM(s) Oral daily  cyanocobalamin Injectable 1000 MICROGram(s) SubCutaneous daily  enoxaparin Injectable 40 milliGRAM(s) SubCutaneous daily  losartan 25 milliGRAM(s) Oral daily  pantoprazole   Suspension 40 milliGRAM(s) Oral daily  senna 2 Tablet(s) Oral at bedtime    MEDICATIONS  (PRN):  acetaminophen   Tablet .. 650 milliGRAM(s) Oral every 6 hours PRN Temp greater or equal to 38C (100.4F), Mild Pain (1 - 3)  bisacodyl Suppository 10 milliGRAM(s) Rectal daily PRN Constipation  magnesium hydroxide Suspension 30 milliLiter(s) Oral daily PRN Constipation  melatonin 3 milliGRAM(s) Oral at bedtime PRN Insomnia

## 2021-05-24 NOTE — PROGRESS NOTE ADULT - ATTENDING COMMENTS
I examined the patient with the resident and we discussed the findings and treatment plan. Tolerating the rehab program well. I agree with the findings and treatment plan as above, which I modified as indicated. The patient continues to require 3 hrs a day of acute inpatient rehab.   Medically stable. Making slow, steady functional gains. Mod assist  for mobility.  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 Will start trial of Lexapro and continue Amantidine.

## 2021-05-25 RX ORDER — PREGABALIN 225 MG/1
1000 CAPSULE ORAL ONCE
Refills: 0 | Status: COMPLETED | OUTPATIENT
Start: 2021-06-01 | End: 2021-06-01

## 2021-05-25 RX ADMIN — Medication 81 MILLIGRAM(S): at 11:49

## 2021-05-25 RX ADMIN — PANTOPRAZOLE SODIUM 40 MILLIGRAM(S): 20 TABLET, DELAYED RELEASE ORAL at 11:49

## 2021-05-25 RX ADMIN — SENNA PLUS 2 TABLET(S): 8.6 TABLET ORAL at 21:05

## 2021-05-25 RX ADMIN — ESCITALOPRAM OXALATE 10 MILLIGRAM(S): 10 TABLET, FILM COATED ORAL at 21:05

## 2021-05-25 RX ADMIN — PREGABALIN 1000 MICROGRAM(S): 225 CAPSULE ORAL at 11:49

## 2021-05-25 RX ADMIN — ENOXAPARIN SODIUM 40 MILLIGRAM(S): 100 INJECTION SUBCUTANEOUS at 11:49

## 2021-05-25 RX ADMIN — AMLODIPINE BESYLATE 5 MILLIGRAM(S): 2.5 TABLET ORAL at 21:05

## 2021-05-25 RX ADMIN — LOSARTAN POTASSIUM 25 MILLIGRAM(S): 100 TABLET, FILM COATED ORAL at 05:22

## 2021-05-25 NOTE — PROGRESS NOTE ADULT - TIME BILLING
Family contact with patient
Patient Contact
60 min patient and family contact  50 minute family contact without patient
Family contact with patient
60 mins patient and family contact
Patient and family contact

## 2021-05-25 NOTE — PROGRESS NOTE ADULT - ATTENDING COMMENTS
I examined the patient with the resident and we discussed the findings and treatment plan. Tolerating the rehab program well. I agree with the findings and treatment plan as above. The patient continues to require 3 hrs a day of acute inpatient rehab.   Remarkable improvement overnight. Increased tracking bilaterally, marked improvement in initiation and psychomotor slowing.  ambulating with CG.

## 2021-05-25 NOTE — PROGRESS NOTE ADULT - SUBJECTIVE AND OBJECTIVE BOX
HPI:    86 year old right handed gentleman with no significant PMH presented to the ED with his granddaughter. Patient last known well at 11:30 AM. Patient found to be aphasic with right sided weakness at around noon.   Stroke code and code NI called on arrival. Patient with NIHSS 18. CTH without intracranial pathology. Patient given IV TPA at 13:23. He was hypertensive on presentation () and was given Labetalol 20 mg IVP and started on nicardipine drip in MICU  (03 May 2021 16:17). 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 No NSx intervention. Stability scan shows stable bleed. vEEG negative for subclinical seizures. Patient found to have right LE DVT. Pt is not a candidate for further anticoagulation, so IVC filter was placed 5/12. Pt tolerated procedure well. Pt was revaluated by speech and was advanced to mechanical soft diet with nectar thick fluids at that time. He was evaluated by PT, OT and was able to stand and ambulate short distances with mod assistance. He was seen by Physiatry on the Stroke service and was found to be a good acute inpatient rehab candidate.      Today's subjective:  Patient seen and evaluated this morning at bedside with attending physician. Patient reported no new complaints. He is minimally verbal but  continues to improve. Patient is In NAD, moving all extremities and following most commands with delayed responses. No acute events overnight.       CLOF: Patient is mod assist for bed mobility and transfers. Ambulated 125' x2 RW mod assist. Mod assist with upper body dressing.      PHYSICAL EXAMINATION   Vital Signs Last 24 Hrs  T(C): 35.4 (25 May 2021 05:42), Max: 35.8 (24 May 2021 13:15)  T(F): 95.8 (25 May 2021 05:42), Max: 96.5 (24 May 2021 13:15)  HR: 68 (25 May 2021 05:42) (65 - 82)  BP: 133/80 (25 May 2021 05:42) (113/57 - 147/67)  RR: 18 (25 May 2021 05:42) (18 - 18)  SpO2: --        General:[ x  ] NAD, resting comfortable in bed,   [   ] other:                                HEENT: [ x  ] NC/AT, EOMI, PERRL , Normal Conjunctivae,   [  ] other:   Cardio: [  x ] RRR, no murmur   [   ] other:                              Pulm: [ x  ] No Respiratory Distress,  Lungs CTAB,   [   ] other:                       Abdomen: [ x  ] ND/NT, Soft,   [   ] other:    : [ x ] NO MIR CATHETER, [   ] MIR CATHETER- no meatal tear, no discharge, [   ] other:                                            MSK: [ x  ] No joint swelling, Full ROM,   [  ] other:                                  Ext: [ x ]No C/C/E, No calf tenderness (known right DVT),   [   ]other:    Skin: [ x  ]intact,   [   ] other:                                                                   Neurological Examination:  Cognitive: [    ] AAO x 3   [ x   ]  other Right hemineglect, left gaze preference. Poor tracking and fixation.  Alert buy with continued poor initiation in answering questions and follows commands. Initiation of speech and movement continues to improve.                                                    Attention:  [    ] intact   [  x  ]  other      poor but improving.                      Memory: [     ] intact    [   x ]  other   impaired  Mood/Affect:  [    ] wnl    [   x ]  other  flat affect                                                                        Communication:  [   ] Fluent,  No dysarthria,   [ x   ] other - brief answers with delayed responses.   CN II - XII  [    ] intact   [  x  ] other difficulty to tracking to Right   Coordination:   [    ] FTN/HTS intact   [ x  ] other  limited exam. Slow response. No ataxia                                                                         Sensory: [ x  ]Intact to light touch   [    ] other                                                                                        Tone:  [  x  ]  wnl,   [    ]  other    Motor   LEFT    UE: [   ] WNL.  [  x ] other: 3/5 poor effort  RIGHT UE:  [   ] WNL.  [ x  ] other: 3-/5  poor effort   LEFT    LE:  [   ] WNL.  [ x  ] other: 3/5 poor effort  RIGHT LE:  [   ] WNL.  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 left sided preference, difficulty tracking                                                                         Sensory: [  x  ] Intact to light touch,   [    ] other:                 Recent labs:                                    11.4   5.97  )-----------( 328      ( 24 May 2021 06:21 )             36.6     05-24    136  |  98  |  12  ----------------------------<  93  4.6   |  28  |  0.9    Ca    9.2      24 May 2021 06:21  Mg     2.4     05-24    TPro  6.3  /  Alb  3.8  /  TBili  0.3  /  DBili  x   /  AST  23  /  ALT  17  /  AlkPhos  110  05-24        MEDICATIONS  (STANDING):  amLODIPine   Tablet 5 milliGRAM(s) Oral at bedtime  aspirin enteric coated 81 milliGRAM(s) Oral daily  cyanocobalamin Injectable 1000 MICROGram(s) SubCutaneous daily  enoxaparin Injectable 40 milliGRAM(s) SubCutaneous daily  escitalopram 10 milliGRAM(s) Oral at bedtime  losartan 25 milliGRAM(s) Oral daily  pantoprazole   Suspension 40 milliGRAM(s) Oral daily  senna 2 Tablet(s) Oral at bedtime    MEDICATIONS  (PRN):  acetaminophen   Tablet .. 650 milliGRAM(s) Oral every 6 hours PRN Temp greater or equal to 38C (100.4F), Mild Pain (1 - 3)  bisacodyl Suppository 10 milliGRAM(s) Rectal daily PRN Constipation  magnesium hydroxide Suspension 30 milliLiter(s) Oral daily PRN Constipation  melatonin 3 milliGRAM(s) Oral at bedtime PRN Insomnia                                   HPI:    86 year old right handed gentleman with no significant PMH presented to the ED with his granddaughter. Patient last known well at 11:30 AM. Patient found to be aphasic with right sided weakness at around noon.   Stroke code and code NI called on arrival. Patient with NIHSS 18. CTH without intracranial pathology. Patient given IV TPA at 13:23. He was hypertensive on presentation () and was given Labetalol 20 mg IVP and started on nicardipine drip in MICU  (03 May 2021 16:17). 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 No NSx intervention. Stability scan shows stable bleed. vEEG negative for subclinical seizures. Patient found to have right LE DVT. Pt is not a candidate for further anticoagulation, so IVC filter was placed 5/12. Pt tolerated procedure well. Pt was revaluated by speech and was advanced to mechanical soft diet with nectar thick fluids at that time. He was evaluated by PT, OT and was able to stand and ambulate short distances with mod assistance. He was seen by Physiatry on the Stroke service and was found to be a good acute inpatient rehab candidate.      Today's subjective:  Patient seen and evaluated this morning at bedside with attending physician. Patient reported no new complaints. He is minimally verbal but  continues to improve. Patient is In NAD, moving all extremities and following most commands with delayed responses. No acute events overnight. Marked improvement in alertness, scanning and psychomotor slowing noted      CLOF: Marked improvement. Requiring only CG for transfers and ambulation with a RW.     PHYSICAL EXAMINATION   Vital Signs Last 24 Hrs  T(C): 35.4 (25 May 2021 05:42), Max: 35.8 (24 May 2021 13:15)  T(F): 95.8 (25 May 2021 05:42), Max: 96.5 (24 May 2021 13:15)  HR: 68 (25 May 2021 05:42) (65 - 82)  BP: 133/80 (25 May 2021 05:42) (113/57 - 147/67)  RR: 18 (25 May 2021 05:42) (18 - 18)  SpO2: --        General:[ x  ] NAD, resting comfortable in bed,   [   ] other:                                HEENT: [ x  ] NC/AT, EOMI, PERRL , Normal Conjunctivae,   [  ] other:   Cardio: [  x ] RRR, no murmur   [   ] other:                              Pulm: [ x  ] No Respiratory Distress,  Lungs CTAB,   [   ] other:                       Abdomen: [ x  ] ND/NT, Soft,   [   ] other:    : [ x ] NO MIR CATHETER, [   ] MIR CATHETER- no meatal tear, no discharge, [   ] other:                                            MSK: [ x  ] No joint swelling, Full ROM,   [  ] other:                                  Ext: [ x ]No C/C/E, No calf tenderness (known right DVT),   [   ]other:    Skin: [ x  ]intact,   [   ] other:                                                                   Neurological Examination:  Cognitive: [  x  ] AAO x 3   [    ]  other Right hemineglect improving                                              Attention:  [    ] intact   [  x  ]  other      mild impairment, improving.                      Memory: [     ] intact    [   x ]  other   impaired  Mood/Affect:  [    ] wnl    [   x ]  other  flat affect                                                                        Communication:  [   ] Fluent,  No dysarthria,   [ x   ] other - brief answers with delayed responses.   CN II - XII  [  x  ] intact   [    ] other   Coordination:   [    ] FTN/HTS intact   [ x  ] other  limited exam. Slow response.                                                                       Sensory: [ x  ]Intact to light touch   [    ] other                                                                                        Tone:  [  x  ]  wnl,   [    ]  other    Motor   LEFT    UE: [   ] WNL.  [  x ] other: 3/5 poor effort  RIGHT UE:  [   ] WNL.  [ x  ] other: 3-/5  poor effort   LEFT    LE:  [   ] WNL.  [ x  ] other: 3/5 poor effort  RIGHT LE:  [   ] WNL.  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 left sided preference, difficulty tracking                                                                         Sensory: [  x  ] Intact to light touch,   [    ] other:                 Recent labs:                                    11.4   5.97  )-----------( 328      ( 24 May 2021 06:21 )             36.6     05-24    136  |  98  |  12  ----------------------------<  93  4.6   |  28  |  0.9    Ca    9.2      24 May 2021 06:21  Mg     2.4     05-24    TPro  6.3  /  Alb  3.8  /  TBili  0.3  /  DBili  x   /  AST  23  /  ALT  17  /  AlkPhos  110  05-24        MEDICATIONS  (STANDING):  amLODIPine   Tablet 5 milliGRAM(s) Oral at bedtime  aspirin enteric coated 81 milliGRAM(s) Oral daily  cyanocobalamin Injectable 1000 MICROGram(s) SubCutaneous daily  enoxaparin Injectable 40 milliGRAM(s) SubCutaneous daily  escitalopram 10 milliGRAM(s) Oral at bedtime  losartan 25 milliGRAM(s) Oral daily  pantoprazole   Suspension 40 milliGRAM(s) Oral daily  senna 2 Tablet(s) Oral at bedtime    MEDICATIONS  (PRN):  acetaminophen   Tablet .. 650 milliGRAM(s) Oral every 6 hours PRN Temp greater or equal to 38C (100.4F), Mild Pain (1 - 3)  bisacodyl Suppository 10 milliGRAM(s) Rectal daily PRN Constipation  magnesium hydroxide Suspension 30 milliLiter(s) Oral daily PRN Constipation  melatonin 3 milliGRAM(s) Oral at bedtime PRN Insomnia

## 2021-05-25 NOTE — PROGRESS NOTE ADULT - NUTRITIONAL ASSESSMENT
This patient has been assessed with a concern for Malnutrition and has been determined to have a diagnosis/diagnoses of Moderate protein-calorie malnutrition.    This patient is being managed with:   Diet Dysphagia 2 Mechanical Soft-Nectar Consistency Fluid-  Lacto Veg (Accepts Milk & Milk Products)     Qty per Day:  w/ THICKENER PKTS  Beneprotein ([facility] Only)     Qty per Day:  BID  Supplement Feeding Modality:  Oral  Ensure Enlive Servings Per Day:  1       Frequency:  Two Times a day  Entered: May 18 2021  3:45PM

## 2021-05-25 NOTE — PROGRESS NOTE ADULT - ASSESSMENT
Neuropsychology Progress Note:       Pain: Location: Denied    Ratin/10   Intervention: N/A  Orientation: Oriented to place, and partially oriented to self (correct name, but incorrect age (stated he was 23-years-old), and incorrect month and date for his birthday) and date (correct year and month with choices).   Arousal Level: Drowsy  Behavior: Pleasant and cooperative  Affect Range: Flat    Needed: Yes    #: Clinician spoke English and the patient responded in English and Gujarati, patient’s son Mr. Cecilio Mcmanus translated when patient spoke in Gujarati  Insight into illness/deficits: Unable to assess reliably  ?Treatment Session Focused on Mood, Cognition, and Family Contact and Education    Assessment Section:     Met with patient and patient’s son, Mr. Cecilio Mcmanus, at bedside. An evaluation of patient’s visual discrimination abilities was attempted. However, when patient was shown the stimulus and asked to pick between A, B, C, or D, patient perseverated and repeated "C, D" for each item, even when he was asked to only choose one. His response time was slow and he was unable to answer many questions. Discussed discharge planning with patient’s son. Patient reportedly will be going to patient’s daughter’s home after discharge. Support and education was provided as needed.    Goals: ? Facilitate Positive Coping ? Family Support / Education ? Cognitive Assessment     Plan: 1-2 times a week 30-60 minutes ? Cognitive Assessment ? Family contact/support/education     Brain Injury Protocol: ? Yes   Over Stimulation Precaution (OSP): II Trial OSP: Modified III

## 2021-05-25 NOTE — PROGRESS NOTE ADULT - ASSESSMENT
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 Good acute rehab candidate. Right hemineglect or right sided inattention.   Patient requires at least 3hours, 5 days a week, of acute inpatient rehab including PT, OT, and speech.    # lethargy/ Abulia  -amantadine 100 BID, improving    #Dysthymia  -Continue low dose Lexapro    # Left DVT in the external iliac, common femoral veins  -IVC filter placed 5/12 due to complication of hemorrhagic conversion of infarction after administration of TPA. Not an A/C candidate  -lovenox 40     # Dysphagia: Dysphagia 2 with nectar-thick liquid  feeds. Monitor intake.    # HTN : SBPgoal 120s - 150s  - Stable  - norvasc 10  - lopressor 25 bid     #B-12 deficient:  -on cyanocobalamin injectable until 5/25/21    #Pain control:   - Tylenol PRN    -GI/Bowel Mgmt    pantoprazole 40 mg Oral daily    -Bladder management: condom cath     -Skin:  -No active issues at this time    -FEN: Monitor intake on current diet       Precautions / PROPHYLAXIS:    - Falls  - Ortho: Weight bearing status: all limbs clear  - DVT: enoxaparin Injectable 40 mg SubQ daily and IVC filter   - Seizure proph: Keppra

## 2021-05-26 PROCEDURE — 74230 X-RAY XM SWLNG FUNCJ C+: CPT | Mod: 26

## 2021-05-26 RX ADMIN — ESCITALOPRAM OXALATE 10 MILLIGRAM(S): 10 TABLET, FILM COATED ORAL at 21:35

## 2021-05-26 RX ADMIN — Medication 81 MILLIGRAM(S): at 11:47

## 2021-05-26 RX ADMIN — SENNA PLUS 2 TABLET(S): 8.6 TABLET ORAL at 21:35

## 2021-05-26 RX ADMIN — PANTOPRAZOLE SODIUM 40 MILLIGRAM(S): 20 TABLET, DELAYED RELEASE ORAL at 11:47

## 2021-05-26 RX ADMIN — ENOXAPARIN SODIUM 40 MILLIGRAM(S): 100 INJECTION SUBCUTANEOUS at 11:47

## 2021-05-26 RX ADMIN — LOSARTAN POTASSIUM 25 MILLIGRAM(S): 100 TABLET, FILM COATED ORAL at 06:19

## 2021-05-26 NOTE — PROGRESS NOTE ADULT - SUBJECTIVE AND OBJECTIVE BOX
HPI:    86 year old right handed gentleman with no significant PMH presented to the ED with his granddaughter. Patient last known well at 11:30 AM. Patient found to be aphasic with right sided weakness at around noon.   Stroke code and code NI called on arrival. Patient with NIHSS 18. CTH without intracranial pathology. Patient given IV TPA at 13:23. He was hypertensive on presentation () and was given Labetalol 20 mg IVP and started on nicardipine drip in MICU  (03 May 2021 16:17). 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 No NSx intervention. Stability scan shows stable bleed. vEEG negative for subclinical seizures. Patient found to have right LE DVT. Pt is not a candidate for further anticoagulation, so IVC filter was placed 5/12. Pt tolerated procedure well. Pt was revaluated by speech and was advanced to mechanical soft diet with nectar thick fluids at that time. He was evaluated by PT, OT and was able to stand and ambulate short distances with mod assistance. He was seen by Physiatry on the Stroke service and was found to be a good acute inpatient rehab candidate.      Today's subjective:  Patient seen and evaluated this morning at bedside with attending physician. Patient reported no new complaints. He is minimally verbal but  continues to improve. Patient is In NAD, moving all extremities and following most commands with delayed responses. No acute events overnight. Marked improvement in alertness, scanning and psychomotor slowing noted      CLOF: Amb 150ft RW supervision bed mobility transfers partial assist     PHYSICAL EXAMINATION   Vital Signs Last 24 Hrs  T(C): 35.4 (26 May 2021 05:49), Max: 36.2 (25 May 2021 21:04)  T(F): 95.7 (26 May 2021 05:49), Max: 97.1 (25 May 2021 21:04)  HR: 72 (26 May 2021 05:49) (63 - 72)  BP: 123/64 (26 May 2021 05:49) (100/55 - 123/64)  RR: 18 (26 May 2021 05:49) (18 - 19)  SpO2: 99% (25 May 2021 21:04) (99% - 99%)        General:[ x  ] NAD, resting comfortable in bed,   [   ] other:                                HEENT: [ x  ] NC/AT, EOMI, PERRL , Normal Conjunctivae,   [  ] other:   Cardio: [  x ] RRR, no murmur   [   ] other:                              Pulm: [ x  ] No Respiratory Distress,  Lungs CTAB,   [   ] other:                       Abdomen: [ x  ] ND/NT, Soft,   [   ] other:    : [ x ] NO MIR CATHETER, [   ] MIR CATHETER- no meatal tear, no discharge, [   ] other:                                            MSK: [ x  ] No joint swelling, Full ROM,   [  ] other:                                  Ext: [ x ]No C/C/E, No calf tenderness (known right DVT),   [   ]other:    Skin: [ x  ]intact,   [   ] other:                                                                   Neurological Examination:  Cognitive: [  x  ] AAO x 3   [    ]  other Right hemineglect improving                                              Attention:  [    ] intact   [  x  ]  other      mild impairment, improving.                      Memory: [     ] intact    [   x ]  other   impaired  Mood/Affect:  [    ] wnl    [   x ]  other  flat affect                                                                        Communication:  [   ] Fluent,  No dysarthria,   [ x   ] other - brief answers with delayed responses.   CN II - XII  [  x  ] intact   [    ] other   Coordination:   [    ] FTN/HTS intact   [ x  ] other  limited exam. Slow response.                                                                       Sensory: [ x  ]Intact to light touch   [    ] other                                                                                        Tone:  [  x  ]  wnl,   [    ]  other    Motor   LEFT    UE: [   ] WNL.  [  x ] other: 4/5   RIGHT UE:  [   ] WNL.  [ x  ] other: 3+/5    LEFT    LE:  [   ] WNL.  [ x  ] other: 3+/5   RIGHT LE:  [   ] WNL.  [  x ] other:  3+/5 range     Reflex:  [  x  ]  symmetric,  [    ]  other:  DTRs: [  x ]symmetric, [   ] other:  Coordination:   [    ] intact,   [   x ] other:  difficulty with coordination likely secondary to neglect. left sided preference, difficulty tracking, showing improvement                                                                        Sensory: [  x  ] Intact to light touch,   [    ] other:                 Recent labs:                                    11.4   5.97  )-----------( 328      ( 24 May 2021 06:21 )             36.6     05-24    136  |  98  |  12  ----------------------------<  93  4.6   |  28  |  0.9    Ca    9.2      24 May 2021 06:21  Mg     2.4     05-24    TPro  6.3  /  Alb  3.8  /  TBili  0.3  /  DBili  x   /  AST  23  /  ALT  17  /  AlkPhos  110  05-24        MEDICATIONS  (STANDING):  amLODIPine   Tablet 5 milliGRAM(s) Oral at bedtime  aspirin enteric coated 81 milliGRAM(s) Oral daily  enoxaparin Injectable 40 milliGRAM(s) SubCutaneous daily  escitalopram 10 milliGRAM(s) Oral at bedtime  losartan 25 milliGRAM(s) Oral daily  pantoprazole   Suspension 40 milliGRAM(s) Oral daily  senna 2 Tablet(s) Oral at bedtime    MEDICATIONS  (PRN):  acetaminophen   Tablet .. 650 milliGRAM(s) Oral every 6 hours PRN Temp greater or equal to 38C (100.4F), Mild Pain (1 - 3)  bisacodyl Suppository 10 milliGRAM(s) Rectal daily PRN Constipation  magnesium hydroxide Suspension 30 milliLiter(s) Oral daily PRN Constipation  melatonin 3 milliGRAM(s) Oral at bedtime PRN Insomnia                                   HPI:    86 year old right handed gentleman with no significant PMH presented to the ED with his granddaughter. Patient last known well at 11:30 AM. Patient found to be aphasic with right sided weakness at around noon.   Stroke code and code NI called on arrival. Patient with NIHSS 18. CTH without intracranial pathology. Patient given IV TPA at 13:23. He was hypertensive on presentation () and was given Labetalol 20 mg IVP and started on nicardipine drip in MICU  (03 May 2021 16:17). 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 No NSx intervention. Stability scan shows stable bleed. vEEG negative for subclinical seizures. Patient found to have right LE DVT. Pt is not a candidate for further anticoagulation, so IVC filter was placed 5/12. Pt tolerated procedure well. Pt was revaluated by speech and was advanced to mechanical soft diet with nectar thick fluids at that time. He was evaluated by PT, OT and was able to stand and ambulate short distances with mod assistance. He was seen by Physiatry on the Stroke service and was found to be a good acute inpatient rehab candidate.      Today's subjective:  Patient seen and evaluated this morning at bedside with attending physician. Patient reported no new complaints. He is minimally verbal but  continues to improve. Patient is In NAD, moving all extremities and following most commands with delayed responses. No acute events overnight. Marked improvement in alertness, scanning and psychomotor slowing noted      CLOF: Amb 150ft RW supervision bed mobility transfers CG    PHYSICAL EXAMINATION   Vital Signs Last 24 Hrs  T(C): 35.4 (26 May 2021 05:49), Max: 36.2 (25 May 2021 21:04)  T(F): 95.7 (26 May 2021 05:49), Max: 97.1 (25 May 2021 21:04)  HR: 72 (26 May 2021 05:49) (63 - 72)  BP: 123/64 (26 May 2021 05:49) (100/55 - 123/64)  RR: 18 (26 May 2021 05:49) (18 - 19)  SpO2: 99% (25 May 2021 21:04) (99% - 99%)        General:[ x  ] NAD, resting comfortable in bed,   [   ] other:                                HEENT: [ x  ] NC/AT, EOMI, PERRL , Normal Conjunctivae,   [  ] other:   Cardio: [  x ] RRR, no murmur   [   ] other:                              Pulm: [ x  ] No Respiratory Distress,  Lungs CTAB,   [   ] other:                       Abdomen: [ x  ] ND/NT, Soft,   [   ] other:    : [ x ] NO MIR CATHETER, [   ] MIR CATHETER- no meatal tear, no discharge, [   ] other:                                            MSK: [ x  ] No joint swelling, Full ROM,   [  ] other:                                  Ext: [ x ]No C/C/E, No calf tenderness (known right DVT),   [   ]other:    Skin: [ x  ]intact,   [   ] other:                                                                   Neurological Examination:  Cognitive: [  x  ] AAO x 3   [    ]  other Right hemineglect improving                                              Attention:  [    ] intact   [  x  ]  other      mild impairment, improving.                      Memory: [     ] intact    [   x ]  other   impaired  Mood/Affect:  [    ] wnl    [   x ]  other  flat affect                                                                        Communication:  [   ] Fluent,  No dysarthria,   [ x   ] other - brief answers with delayed responses. With marked improvement.  CN II - XII  [  x  ] intact   [    ] other   Coordination:   [    ] FTN/HTS intact   [ x  ] other  limited exam. Slow response.                                                                       Sensory: [ x  ]Intact to light touch   [    ] other                                                                                        Tone:  [  x  ]  wnl,   [    ]  other    Motor   LEFT    UE: [   ] WNL.  [  x ] other: 4/5   RIGHT UE:  [   ] WNL.  [ x  ] other: 4/5    LEFT    LE:  [   ] WNL.  [ x  ] other: 3+/5   RIGHT LE:  [   ] WNL.  [  x ] other:  3+/5 range     Reflex:  [  x  ]  symmetric,  [    ]  other:  DTRs: [  x ]symmetric, [   ] other:  Coordination:   [    ] intact,   [   x ] other:  difficulty with coordination likely secondary to neglect. left sided preference, difficulty tracking, showing improvement                                                                        Sensory: [  x  ] Intact to light touch,   [    ] other:                 Recent labs:                                    11.4   5.97  )-----------( 328      ( 24 May 2021 06:21 )             36.6     05-24    136  |  98  |  12  ----------------------------<  93  4.6   |  28  |  0.9    Ca    9.2      24 May 2021 06:21  Mg     2.4     05-24    TPro  6.3  /  Alb  3.8  /  TBili  0.3  /  DBili  x   /  AST  23  /  ALT  17  /  AlkPhos  110  05-24        MEDICATIONS  (STANDING):  amLODIPine   Tablet 5 milliGRAM(s) Oral at bedtime  aspirin enteric coated 81 milliGRAM(s) Oral daily  enoxaparin Injectable 40 milliGRAM(s) SubCutaneous daily  escitalopram 10 milliGRAM(s) Oral at bedtime  losartan 25 milliGRAM(s) Oral daily  pantoprazole   Suspension 40 milliGRAM(s) Oral daily  senna 2 Tablet(s) Oral at bedtime    MEDICATIONS  (PRN):  acetaminophen   Tablet .. 650 milliGRAM(s) Oral every 6 hours PRN Temp greater or equal to 38C (100.4F), Mild Pain (1 - 3)  bisacodyl Suppository 10 milliGRAM(s) Rectal daily PRN Constipation  magnesium hydroxide Suspension 30 milliLiter(s) Oral daily PRN Constipation  melatonin 3 milliGRAM(s) Oral at bedtime PRN Insomnia

## 2021-05-26 NOTE — PROGRESS NOTE ADULT - NUTRITIONAL ASSESSMENT
This patient has been assessed with a concern for Malnutrition and has been determined to have a diagnosis/diagnoses of Moderate protein-calorie malnutrition.    This patient is being managed with:   Diet Dysphagia 2 Mechanical Soft-Nectar Consistency Fluid-  Lacto Veg (Accepts Milk & Milk Products)     Qty per Day:  w/ THICKENER PKTS  Beneprotein ([facility] Only)     Qty per Day:  BID  Supplement Feeding Modality:  Oral  Ensure Enlive Servings Per Day:  1       Frequency:  Two Times a day  Entered: May 18 2021  3:45PM     This patient has been assessed with a concern for Malnutrition and has been determined to have a diagnosis/diagnoses of Moderate protein-calorie malnutrition.    This patient is being managed with:   Diet Dysphagia 3 Mechanical Soft-Nectar Consistency Fluid-  Lacto Veg (Accepts Milk & Milk Products)     Qty per Day:  w/ THICKENER PKTS  Beneprotein ([facility] Only)     Qty per Day:  BID  Supplement Feeding Modality:  Oral  Ensure Enlive Servings Per Day:  1       Frequency:  Two Times a day  Entered: May 18 2021  3:45PM

## 2021-05-26 NOTE — PROGRESS NOTE ADULT - ASSESSMENT
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 Good acute rehab candidate. Right hemineglect or right sided inattention.   Patient requires at least 3hours, 5 days a week, of acute inpatient rehab including PT, OT, and speech.    # lethargy/ Abulia  -amantadine 100 BID, improving    #Dysthymia  -Continue low dose Lexapro    # Left DVT in the external iliac, common femoral veins  -IVC filter placed 5/12 due to complication of hemorrhagic conversion of infarction after administration of TPA. Not an A/C candidate  -lovenox 40     # Dysphagia: Dysphagia 2 with nectar-thick liquid  feeds. Monitor intake.    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 Good acute rehab candidate. Right hemineglect or right sided inattention.   Patient requires at least 3hours, 5 days a week, of acute inpatient rehab including PT, OT, and speech. Making remarkable gains.    # lethargy/ Abulia  -amantadine 100 BID, improving    #Dysthymia  -Continue low dose Lexapro. Affect markedly improved.    # Left DVT in the external iliac, common femoral veins  -IVC filter placed 5/12 due to complication of hemorrhagic conversion of infarction after administration of TPA. Not an A/C candidate  -lovenox 40     # Dysphagia: Dysphagia 3 with nectar-thick liquid  feeds. Monitor intake.    # HTN : SBPgoal 120s - 150s  - Stable  - norvasc 10  - lopressor 25 bid     #B-12 deficient:  -on cyanocobalamin injectable until 5/25/21    #Pain control:   - Tylenol PRN    -GI/Bowel Mgmt    pantoprazole 40 mg Oral daily    -Bladder management: condom cath     -Skin:  -No active issues at this time    -FEN: Monitor intake on current diet       Precautions / PROPHYLAXIS:    - Falls  - Ortho: Weight bearing status: all limbs clear  - DVT: enoxaparin Injectable 40 mg SubQ daily and IVC filter   - Seizure proph: Keppra

## 2021-05-26 NOTE — PROGRESS NOTE ADULT - ATTENDING COMMENTS
I examined the patient with the resident and we discussed the findings and treatment plan. Tolerating the rehab program well. I agree with the findings and treatment plan as above, which I corrected as indicated. The patient continues to require 3 hrs a day of acute inpatient rehab.     Continues to make remarkable gains in alertness, initiation, tracking and social interaction and mobility. Advanced to dysphagia 3 diet.     Continue aggressive inpatient program. Cont amantadine and Lexapro.

## 2021-05-27 PROBLEM — Z00.00 ENCOUNTER FOR PREVENTIVE HEALTH EXAMINATION: Status: ACTIVE | Noted: 2021-05-27

## 2021-05-27 PROCEDURE — 70450 CT HEAD/BRAIN W/O DYE: CPT | Mod: 26

## 2021-05-27 RX ADMIN — SENNA PLUS 2 TABLET(S): 8.6 TABLET ORAL at 21:56

## 2021-05-27 RX ADMIN — PANTOPRAZOLE SODIUM 40 MILLIGRAM(S): 20 TABLET, DELAYED RELEASE ORAL at 11:51

## 2021-05-27 RX ADMIN — Medication 81 MILLIGRAM(S): at 11:51

## 2021-05-27 RX ADMIN — AMLODIPINE BESYLATE 5 MILLIGRAM(S): 2.5 TABLET ORAL at 21:56

## 2021-05-27 RX ADMIN — LOSARTAN POTASSIUM 25 MILLIGRAM(S): 100 TABLET, FILM COATED ORAL at 05:55

## 2021-05-27 RX ADMIN — ENOXAPARIN SODIUM 40 MILLIGRAM(S): 100 INJECTION SUBCUTANEOUS at 11:51

## 2021-05-27 NOTE — PROGRESS NOTE ADULT - SUBJECTIVE AND OBJECTIVE BOX
HPI:    86 year old right handed gentleman with no significant PMH presented to the ED with his granddaughter. Patient last known well at 11:30 AM. Patient found to be aphasic with right sided weakness at around noon.   Stroke code and code NI called on arrival. Patient with NIHSS 18. CTH without intracranial pathology. Patient given IV TPA at 13:23. He was hypertensive on presentation () and was given Labetalol 20 mg IVP and started on nicardipine drip in MICU  (03 May 2021 16:17). 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 No NSx intervention. Stability scan shows stable bleed. vEEG negative for subclinical seizures. Patient found to have right LE DVT. Pt is not a candidate for further anticoagulation, so IVC filter was placed 5/12. Pt tolerated procedure well. Pt was revaluated by speech and was advanced to mechanical soft diet with nectar thick fluids at that time. He was evaluated by PT, OT and was able to stand and ambulate short distances with mod assistance. He was seen by Physiatry on the Stroke service and was found to be a good acute inpatient rehab candidate.      Today's subjective:  Patient seen and evaluated this morning at bedside with attending physician. Patient reported no new complaints. He is minimally verbal but  continues to improve. Patient is In NAD, moving all extremities and following most commands with delayed responses. No acute events overnight. Will d/c lexapro today.       CLOF: Amb 150ft RW supervision bed mobility transfers CG    PHYSICAL EXAMINATION   Vital Signs Last 24 Hrs  T(C): 35.4 (26 May 2021 05:49), Max: 36.2 (25 May 2021 21:04)  T(F): 95.7 (26 May 2021 05:49), Max: 97.1 (25 May 2021 21:04)  HR: 72 (26 May 2021 05:49) (63 - 72)  BP: 123/64 (26 May 2021 05:49) (100/55 - 123/64)  RR: 18 (26 May 2021 05:49) (18 - 19)  SpO2: 99% (25 May 2021 21:04) (99% - 99%)        General:[ x  ] NAD, resting comfortable in bed,   [   ] other:                                HEENT: [ x  ] NC/AT, EOMI, PERRL , Normal Conjunctivae,   [  ] other:   Cardio: [  x ] RRR, no murmur   [   ] other:                              Pulm: [ x  ] No Respiratory Distress,  Lungs CTAB,   [   ] other:                       Abdomen: [ x  ] ND/NT, Soft,   [   ] other:    : [ x ] NO MIR CATHETER, [   ] MIR CATHETER- no meatal tear, no discharge, [   ] other:                                            MSK: [ x  ] No joint swelling, Full ROM,   [  ] other:                                  Ext: [ x ]No C/C/E, No calf tenderness (known right DVT),   [   ]other:    Skin: [ x  ]intact,   [   ] other:                                                                   Neurological Examination:  Cognitive: [  x  ] AAO x 3   [    ]  other Right hemineglect improving                                              Attention:  [    ] intact   [  x  ]  other      mild impairment, improving.                      Memory: [     ] intact    [   x ]  other   impaired  Mood/Affect:  [    ] wnl    [   x ]  other  flat affect                                                                        Communication:  [   ] Fluent,  No dysarthria,   [ x   ] other - brief answers with delayed responses. With marked improvement.  CN II - XII  [  x  ] intact   [    ] other   Coordination:   [    ] FTN/HTS intact   [ x  ] other  limited exam. Slow response.                                                                       Sensory: [ x  ]Intact to light touch   [    ] other                                                                                        Tone:  [  x  ]  wnl,   [    ]  other    Motor   LEFT    UE: [   ] WNL.  [  x ] other: 4/5   RIGHT UE:  [   ] WNL.  [ x  ] other: 4/5    LEFT    LE:  [   ] WNL.  [ x  ] other: 3+/5   RIGHT LE:  [   ] WNL.  [  x ] other:  3+/5 range     Reflex:  [  x  ]  symmetric,  [    ]  other:  DTRs: [  x ]symmetric, [   ] other:  Coordination:   [    ] intact,   [   x ] other:  difficulty with coordination likely secondary to neglect. left sided preference, difficulty tracking, showing improvement                                                                        Sensory: [  x  ] Intact to light touch,   [    ] other:                 Recent labs:                                    11.4   5.97  )-----------( 328      ( 24 May 2021 06:21 )             36.6     05-24    136  |  98  |  12  ----------------------------<  93  4.6   |  28  |  0.9    Ca    9.2      24 May 2021 06:21  Mg     2.4     05-24    TPro  6.3  /  Alb  3.8  /  TBili  0.3  /  DBili  x   /  AST  23  /  ALT  17  /  AlkPhos  110  05-24        MEDICATIONS  (STANDING):  amLODIPine   Tablet 5 milliGRAM(s) Oral at bedtime  aspirin enteric coated 81 milliGRAM(s) Oral daily  enoxaparin Injectable 40 milliGRAM(s) SubCutaneous daily  losartan 25 milliGRAM(s) Oral daily  pantoprazole   Suspension 40 milliGRAM(s) Oral daily  senna 2 Tablet(s) Oral at bedtime    MEDICATIONS  (PRN):  acetaminophen   Tablet .. 650 milliGRAM(s) Oral every 6 hours PRN Temp greater or equal to 38C (100.4F), Mild Pain (1 - 3)  bisacodyl Suppository 10 milliGRAM(s) Rectal daily PRN Constipation  magnesium hydroxide Suspension 30 milliLiter(s) Oral daily PRN Constipation  melatonin 3 milliGRAM(s) Oral at bedtime PRN Insomnia                                   HPI:    86 year old right handed gentleman with no significant PMH presented to the ED with his granddaughter. Patient last known well at 11:30 AM. Patient found to be aphasic with right sided weakness at around noon.   Stroke code and code NI called on arrival. Patient with NIHSS 18. CTH without intracranial pathology. Patient given IV TPA at 13:23. He was hypertensive on presentation () and was given Labetalol 20 mg IVP and started on nicardipine drip in MICU  (03 May 2021 16:17). 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 No NSx intervention. Stability scan shows stable bleed. vEEG negative for subclinical seizures. Patient found to have right LE DVT. Pt is not a candidate for further anticoagulation, so IVC filter was placed 5/12. Pt tolerated procedure well. Pt was revaluated by speech and was advanced to mechanical soft diet with nectar thick fluids at that time. He was evaluated by PT, OT and was able to stand and ambulate short distances with mod assistance. He was seen by Physiatry on the Stroke service and was found to be a good acute inpatient rehab candidate.      Today's subjective:  Patient seen and evaluated this morning at bedside with attending physician. Patient reported no new complaints. He is minimally verbal. Patient is In NAD, moving all extremities and following most commands with delayed responses. No acute events overnight. He appears more subdued this morning with decreased initiation and increased echolalia. Will d/c lexapro which was just started and monitor. Will repeat head CT if decline continues.       CLOF: Amb 150ft RW supervision/ CG, bed mobility transfers CG    PHYSICAL EXAMINATION     ICU Vital Signs Last 24 Hrs  T(C): 36.4 (27 May 2021 13:37), Max: 36.4 (27 May 2021 13:37)  T(F): 97.5 (27 May 2021 13:37), Max: 97.5 (27 May 2021 13:37)  HR: 67 (27 May 2021 13:37) (65 - 76)  BP: 125/58 (27 May 2021 13:37) (108/58 - 135/72)  BP(mean): --  ABP: --  ABP(mean): --  RR: 18 (27 May 2021 13:37) (18 - 19)  SpO2: 98% (27 May 2021 05:31) (98% - 98%)        General:[ x  ] NAD, resting comfortable in bed,   [   ] other:                                HEENT: [ x  ] NC/AT, EOMI, PERRL , Normal Conjunctivae,   [  ] other:   Cardio: [  x ] RRR, no murmur   [   ] other:                              Pulm: [ x  ] No Respiratory Distress,  Lungs CTAB,   [   ] other:                       Abdomen: [ x  ] ND/NT, Soft,   [   ] other:    : [ x ] NO MIR CATHETER, [   ] MIR CATHETER- no meatal tear, no discharge, [   ] other:                                            MSK: [ x  ] No joint swelling, Full ROM,   [  ] other:                                  Ext: [ x ]No C/C/E, No calf tenderness (known right DVT),   [   ]other:    Skin: [ x  ]intact,   [   ] other:                                                                   Neurological Examination:  Cognitive: [  x  ] AAO x 3   [    ]  other Right hemineglect                                            Attention:  [    ] intact   [  x  ]  other      mild impairment                     Memory: [     ] intact    [   x ]  other   impaired  Mood/Affect:  [    ] wnl    [   x ]  other  flat affect                                                                        Communication:  [   ] Fluent,  No dysarthria,   [ x   ] other - brief answers with delayed responses.  CN II - XII  [  x  ] intact   [    ] other   Coordination:   [    ] FTN/HTS intact   [ x  ] other  limited exam. Slow response.                                                                       Sensory: [ x  ]Intact to light touch   [    ] other                                                                                        Tone:  [  x  ]  wnl,   [    ]  other    Motor   LEFT    UE: [   ] WNL.  [  x ] other: 4/5   RIGHT UE:  [   ] WNL.  [ x  ] other: 4/5    LEFT    LE:  [   ] WNL.  [ x  ] other: 3+/5   RIGHT LE:  [   ] WNL.  [  x ] other:  3+/5 range     Reflex:  [  x  ]  symmetric,  [    ]  other:  DTRs: [  x ]symmetric, [   ] other:  Coordination:   [    ] intact,   [   x ] other:  difficulty with coordination likely secondary to neglect. left sided preference, difficulty tracking, showing improvement                                                                        Sensory: [  x  ] Intact to light touch,   [    ] other:                 Recent labs:                                    11.4   5.97  )-----------( 328      ( 24 May 2021 06:21 )             36.6     05-24    136  |  98  |  12  ----------------------------<  93  4.6   |  28  |  0.9    Ca    9.2      24 May 2021 06:21  Mg     2.4     05-24    TPro  6.3  /  Alb  3.8  /  TBili  0.3  /  DBili  x   /  AST  23  /  ALT  17  /  AlkPhos  110  05-24        MEDICATIONS  (STANDING):  amLODIPine   Tablet 5 milliGRAM(s) Oral at bedtime  aspirin enteric coated 81 milliGRAM(s) Oral daily  enoxaparin Injectable 40 milliGRAM(s) SubCutaneous daily  losartan 25 milliGRAM(s) Oral daily  pantoprazole   Suspension 40 milliGRAM(s) Oral daily  senna 2 Tablet(s) Oral at bedtime    MEDICATIONS  (PRN):  acetaminophen   Tablet .. 650 milliGRAM(s) Oral every 6 hours PRN Temp greater or equal to 38C (100.4F), Mild Pain (1 - 3)  bisacodyl Suppository 10 milliGRAM(s) Rectal daily PRN Constipation  magnesium hydroxide Suspension 30 milliLiter(s) Oral daily PRN Constipation  melatonin 3 milliGRAM(s) Oral at bedtime PRN Insomnia

## 2021-05-27 NOTE — PROGRESS NOTE ADULT - ATTENDING COMMENTS
I reviewed the chart and examined the patient with the resident and we discussed the findings and treatment plan.  The patient is tolerating the rehab program well. I agree with the findings and treatment plan above, which I modified as indicated. The patient requires 3 hrs a day of acute inpatient rehab.   Increased abulia and neglect and echolalia this morning c/w the last 2 days. He was just started on low dose Lexapro, which I discontinued. If decline continues, will get repeat head CT and UA and labs.  Continue full rehab program and monitor.

## 2021-05-27 NOTE — PROGRESS NOTE ADULT - NUTRITIONAL ASSESSMENT
This patient has been assessed with a concern for Malnutrition and has been determined to have a diagnosis/diagnoses of Moderate protein-calorie malnutrition.    This patient is being managed with:   Diet Dysphagia 3 Soft-Thin Liquids-  Lacto Veg (Accepts Milk & Milk Products)  Beneprotein ([facility] Only)     Qty per Day:  2  Supplement Feeding Modality:  Oral  Ensure Enlive Servings Per Day:  1       Frequency:  Two Times a day  Entered: May 26 2021 11:40AM

## 2021-05-27 NOTE — PROGRESS NOTE ADULT - ASSESSMENT
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 Good acute rehab candidate. Right hemineglect or right sided inattention.   Patient requires at least 3hours, 5 days a week, of acute inpatient rehab including PT, OT, and speech. Making remarkable gains.    # lethargy/ Abulia  -amantadine 100 BID, improving    #Dysthymia  - Will d/c Lexapro  - Affect markedly improved.    # Left DVT in the external iliac, common femoral veins  -IVC filter placed 5/12 due to complication of hemorrhagic conversion of infarction after administration of TPA. Not an A/C candidate  -lovenox 40     # Dysphagia: Dysphagia 3 with nectar-thick liquid  feeds. Monitor intake.    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 Good acute rehab candidate. Right hemineglect or right sided inattention.   Patient requires at least 3hours, 5 days a week, of acute inpatient rehab including PT, OT, and speech.     # lethargy/ Abulia - some decline today  -amantadine 100 BID, improving  - d/c lexapro    #Dysthymia  - Will d/c Lexapro and monitor    # Left DVT in the external iliac, common femoral veins  -IVC filter placed 5/12 due to complication of hemorrhagic conversion of infarction after administration of TPA. Not an A/C candidate  -lovenox 40     # Dysphagia: Dysphagia 3 with nectar-thick liquid  feeds. Monitor intake.    # HTN : SBP goal 120s - 150s  - Stable  - norvasc 10  - lopressor 25 bid     #B-12 deficient:  -on cyanocobalamin injectable until 5/25/21    #Pain control:   - Tylenol PRN    -GI/Bowel Mgmt    pantoprazole 40 mg Oral daily    -Bladder management: condom cath     -Skin:  -No active issues at this time    -FEN: Monitor intake on current diet       Precautions / PROPHYLAXIS:    - Falls  - Ortho: Weight bearing status: all limbs clear  - DVT: enoxaparin Injectable 40 mg SubQ daily and IVC filter   - Seizure proph: Keppra

## 2021-05-28 LAB
ALBUMIN SERPL ELPH-MCNC: 3.7 G/DL — SIGNIFICANT CHANGE UP (ref 3.5–5.2)
ALP SERPL-CCNC: 91 U/L — SIGNIFICANT CHANGE UP (ref 30–115)
ALT FLD-CCNC: 17 U/L — SIGNIFICANT CHANGE UP (ref 0–41)
ANION GAP SERPL CALC-SCNC: 10 MMOL/L — SIGNIFICANT CHANGE UP (ref 7–14)
APPEARANCE UR: CLEAR — SIGNIFICANT CHANGE UP
AST SERPL-CCNC: 20 U/L — SIGNIFICANT CHANGE UP (ref 0–41)
BILIRUB SERPL-MCNC: 0.3 MG/DL — SIGNIFICANT CHANGE UP (ref 0.2–1.2)
BILIRUB UR-MCNC: NEGATIVE — SIGNIFICANT CHANGE UP
BUN SERPL-MCNC: 12 MG/DL — SIGNIFICANT CHANGE UP (ref 10–20)
CALCIUM SERPL-MCNC: 9.3 MG/DL — SIGNIFICANT CHANGE UP (ref 8.5–10.1)
CHLORIDE SERPL-SCNC: 99 MMOL/L — SIGNIFICANT CHANGE UP (ref 98–110)
CO2 SERPL-SCNC: 28 MMOL/L — SIGNIFICANT CHANGE UP (ref 17–32)
COLOR SPEC: SIGNIFICANT CHANGE UP
CREAT SERPL-MCNC: 0.8 MG/DL — SIGNIFICANT CHANGE UP (ref 0.7–1.5)
DIFF PNL FLD: NEGATIVE — SIGNIFICANT CHANGE UP
GLUCOSE SERPL-MCNC: 93 MG/DL — SIGNIFICANT CHANGE UP (ref 70–99)
GLUCOSE UR QL: NEGATIVE — SIGNIFICANT CHANGE UP
HCT VFR BLD CALC: 35.1 % — LOW (ref 42–52)
HGB BLD-MCNC: 11 G/DL — LOW (ref 14–18)
KETONES UR-MCNC: NEGATIVE — SIGNIFICANT CHANGE UP
LEUKOCYTE ESTERASE UR-ACNC: NEGATIVE — SIGNIFICANT CHANGE UP
MCHC RBC-ENTMCNC: 25.1 PG — LOW (ref 27–31)
MCHC RBC-ENTMCNC: 31.3 G/DL — LOW (ref 32–37)
MCV RBC AUTO: 80 FL — SIGNIFICANT CHANGE UP (ref 80–94)
NITRITE UR-MCNC: NEGATIVE — SIGNIFICANT CHANGE UP
NRBC # BLD: 0 /100 WBCS — SIGNIFICANT CHANGE UP (ref 0–0)
PH UR: 7 — SIGNIFICANT CHANGE UP (ref 5–8)
PLATELET # BLD AUTO: 268 K/UL — SIGNIFICANT CHANGE UP (ref 130–400)
POTASSIUM SERPL-MCNC: 4.4 MMOL/L — SIGNIFICANT CHANGE UP (ref 3.5–5)
POTASSIUM SERPL-SCNC: 4.4 MMOL/L — SIGNIFICANT CHANGE UP (ref 3.5–5)
PROT SERPL-MCNC: 6.2 G/DL — SIGNIFICANT CHANGE UP (ref 6–8)
PROT UR-MCNC: NEGATIVE — SIGNIFICANT CHANGE UP
RBC # BLD: 4.39 M/UL — LOW (ref 4.7–6.1)
RBC # FLD: 15.9 % — HIGH (ref 11.5–14.5)
SODIUM SERPL-SCNC: 137 MMOL/L — SIGNIFICANT CHANGE UP (ref 135–146)
SP GR SPEC: 1.01 — SIGNIFICANT CHANGE UP (ref 1.01–1.03)
UROBILINOGEN FLD QL: SIGNIFICANT CHANGE UP
WBC # BLD: 5.46 K/UL — SIGNIFICANT CHANGE UP (ref 4.8–10.8)
WBC # FLD AUTO: 5.46 K/UL — SIGNIFICANT CHANGE UP (ref 4.8–10.8)

## 2021-05-28 RX ORDER — PREGABALIN 225 MG/1
1000 CAPSULE ORAL
Qty: 0 | Refills: 0 | DISCHARGE
Start: 2021-05-28

## 2021-05-28 RX ORDER — AMLODIPINE BESYLATE 2.5 MG/1
1 TABLET ORAL
Qty: 0 | Refills: 0 | DISCHARGE
Start: 2021-05-28

## 2021-05-28 RX ORDER — LANOLIN ALCOHOL/MO/W.PET/CERES
1 CREAM (GRAM) TOPICAL
Qty: 0 | Refills: 0 | DISCHARGE
Start: 2021-05-28

## 2021-05-28 RX ORDER — ACETAMINOPHEN 500 MG
2 TABLET ORAL
Qty: 0 | Refills: 0 | DISCHARGE
Start: 2021-05-28

## 2021-05-28 RX ADMIN — ENOXAPARIN SODIUM 40 MILLIGRAM(S): 100 INJECTION SUBCUTANEOUS at 13:03

## 2021-05-28 RX ADMIN — Medication 650 MILLIGRAM(S): at 08:00

## 2021-05-28 RX ADMIN — LOSARTAN POTASSIUM 25 MILLIGRAM(S): 100 TABLET, FILM COATED ORAL at 06:30

## 2021-05-28 RX ADMIN — SENNA PLUS 2 TABLET(S): 8.6 TABLET ORAL at 21:54

## 2021-05-28 RX ADMIN — PANTOPRAZOLE SODIUM 40 MILLIGRAM(S): 20 TABLET, DELAYED RELEASE ORAL at 13:03

## 2021-05-28 RX ADMIN — AMLODIPINE BESYLATE 5 MILLIGRAM(S): 2.5 TABLET ORAL at 21:54

## 2021-05-28 RX ADMIN — Medication 81 MILLIGRAM(S): at 13:03

## 2021-05-28 NOTE — PROGRESS NOTE ADULT - ATTENDING COMMENTS
I reviewed the chart and examined the patient with the resident and we discussed the findings and treatment plan.  The patient is tolerating the rehab program well. I agree with the findings and treatment plan above, which I modified as indicated. The patient requires 3 hrs a day of acute inpatient rehab.   initiation and psychomotor slowing have improve again since his Lexapro was stopped. Head CT and labs are stable.   He is ambulating with CG.   Continue current plan as above.

## 2021-05-28 NOTE — PROGRESS NOTE ADULT - NUTRITIONAL ASSESSMENT
This patient has been assessed with a concern for Malnutrition and has been determined to have a diagnosis/diagnoses of Moderate protein-calorie malnutrition.    This patient is being managed with:   Diet Dysphagia 3 Soft-Thin Liquids-  Lacto Veg (Accepts Milk & Milk Products)  Beneprotein ([facility] Only)     Qty per Day:  2  Supplement Feeding Modality:  Oral  Ensure Enlive Servings Per Day:  1       Frequency:  Two Times a day  Entered: May 26 2021 11:40AM

## 2021-05-28 NOTE — PROGRESS NOTE ADULT - SUBJECTIVE AND OBJECTIVE BOX
HPI:    86 year old right handed gentleman with no significant PMH presented to the ED with his granddaughter. Patient last known well at 11:30 AM. Patient found to be aphasic with right sided weakness at around noon.   Stroke code and code NI called on arrival. Patient with NIHSS 18. CTH without intracranial pathology. Patient given IV TPA at 13:23. He was hypertensive on presentation () and was given Labetalol 20 mg IVP and started on nicardipine drip in MICU  (03 May 2021 16:17). 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 No NSx intervention. Stability scan shows stable bleed. vEEG negative for subclinical seizures. Patient found to have right LE DVT. Pt is not a candidate for further anticoagulation, so IVC filter was placed 5/12. Pt tolerated procedure well. Pt was revaluated by speech and was advanced to mechanical soft diet with nectar thick fluids at that time. He was evaluated by PT, OT and was able to stand and ambulate short distances with mod assistance. He was seen by Physiatry on the Stroke service and was found to be a good acute inpatient rehab candidate.      Today's subjective:  Patient seen and evaluated this morning at bedside with attending physician. Patient reported no new complaints. He is minimally verbal. Patient is In NAD, moving all extremities and following most commands with delayed responses. No acute events overnight.       CLOF: Amb 150ft RW supervision/ CG, bed mobility transfers CG    PHYSICAL EXAMINATION     Vital Signs Last 24 Hrs  T(C): 36.9 (28 May 2021 05:50), Max: 36.9 (28 May 2021 05:50)  T(F): 98.4 (28 May 2021 05:50), Max: 98.4 (28 May 2021 05:50)  HR: 70 (28 May 2021 05:50) (67 - 75)  BP: 135/65 (28 May 2021 05:50) (113/55 - 135/65)  RR: 18 (28 May 2021 05:50) (18 - 18)        General:[ x  ] NAD, resting comfortable in bed,   [   ] other:                                HEENT: [ x  ] NC/AT, EOMI, PERRL , Normal Conjunctivae,   [  ] other:   Cardio: [  x ] RRR, no murmur   [   ] other:                              Pulm: [ x  ] No Respiratory Distress,  Lungs CTAB,   [   ] other:                       Abdomen: [ x  ] ND/NT, Soft,   [   ] other:    : [ x ] NO MIR CATHETER, [   ] MIR CATHETER- no meatal tear, no discharge, [   ] other:                                            MSK: [ x  ] No joint swelling, Full ROM,   [  ] other:                                  Ext: [ x ]No C/C/E, No calf tenderness (known right DVT),   [   ]other:    Skin: [ x  ]intact,   [   ] other:                                                                   Neurological Examination:  Cognitive: [  x  ] AAO x 3   [    ]  other Right hemineglect                                            Attention:  [    ] intact   [  x  ]  other      mild impairment                     Memory: [     ] intact    [   x ]  other   impaired  Mood/Affect:  [    ] wnl    [   x ]  other  flat affect                                                                        Communication:  [   ] Fluent,  No dysarthria,   [ x   ] other - brief answers with delayed responses.  CN II - XII  [  x  ] intact   [    ] other   Coordination:   [    ] FTN/HTS intact   [ x  ] other  limited exam. Slow response.                                                                       Sensory: [ x  ]Intact to light touch   [    ] other                                                                                        Tone:  [  x  ]  wnl,   [    ]  other    Motor   LEFT    UE: [   ] WNL.  [  x ] other: 4/5   RIGHT UE:  [   ] WNL.  [ x  ] other: 4/5    LEFT    LE:  [   ] WNL.  [ x  ] other: 3+/5   RIGHT LE:  [   ] WNL.  [  x ] other:  3+/5 range     Reflex:  [  x  ]  symmetric,  [    ]  other:  DTRs: [  x ]symmetric, [   ] other:  Coordination:   [    ] intact,   [   x ] other:  difficulty with coordination likely secondary to neglect. left sided preference, difficulty tracking, showing improvement                                                                        Sensory: [  x  ] Intact to light touch,   [    ] other:                 Recent labs:                                    11.4   5.97  )-----------( 328      ( 24 May 2021 06:21 )             36.6     05-24    136  |  98  |  12  ----------------------------<  93  4.6   |  28  |  0.9    Ca    9.2      24 May 2021 06:21  Mg     2.4     05-24    TPro  6.3  /  Alb  3.8  /  TBili  0.3  /  DBili  x   /  AST  23  /  ALT  17  /  AlkPhos  110  05-24        MEDICATIONS  (STANDING):  amLODIPine   Tablet 5 milliGRAM(s) Oral at bedtime  aspirin enteric coated 81 milliGRAM(s) Oral daily  enoxaparin Injectable 40 milliGRAM(s) SubCutaneous daily  losartan 25 milliGRAM(s) Oral daily  pantoprazole   Suspension 40 milliGRAM(s) Oral daily  senna 2 Tablet(s) Oral at bedtime    MEDICATIONS  (PRN):  acetaminophen   Tablet .. 650 milliGRAM(s) Oral every 6 hours PRN Temp greater or equal to 38C (100.4F), Mild Pain (1 - 3)  bisacodyl Suppository 10 milliGRAM(s) Rectal daily PRN Constipation  magnesium hydroxide Suspension 30 milliLiter(s) Oral daily PRN Constipation  melatonin 3 milliGRAM(s) Oral at bedtime PRN Insomnia   HPI:    86 year old right handed gentleman with no significant PMH presented to the ED with his granddaughter. Patient last known well at 11:30 AM. Patient found to be aphasic with right sided weakness at around noon.   Stroke code and code NI called on arrival. Patient with NIHSS 18. CTH without intracranial pathology. Patient given IV TPA at 13:23. He was hypertensive on presentation () and was given Labetalol 20 mg IVP and started on nicardipine drip in MICU  (03 May 2021 16:17). 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 No NSx intervention. Stability scan shows stable bleed. vEEG negative for subclinical seizures. Patient found to have right LE DVT. Pt is not a candidate for further anticoagulation, so IVC filter was placed . Pt tolerated procedure well. Pt was revaluated by speech and was advanced to mechanical soft diet with nectar thick fluids at that time. He was evaluated by PT, OT and was able to stand and ambulate short distances with mod assistance. He was seen by Physiatry on the Stroke service and was found to be a good acute inpatient rehab candidate.      Today's subjective:  Patient seen and evaluated this morning at bedside with attending physician. Patient reported no new complaints. He is minimally verbal. Patient is In NAD, moving all extremities and following most commands with delayed responses. No acute events overnight.   His psycho-motor slowing and tracking have again improves since the Lexapro was discontinued.      CLOF: Amb 150ft RW supervision/ CG, bed mobility transfers CG    PHYSICAL EXAMINATION     Vital Signs Last 24 Hrs  T(C): 36.9 (28 May 2021 05:50), Max: 36.9 (28 May 2021 05:50)  T(F): 98.4 (28 May 2021 05:50), Max: 98.4 (28 May 2021 05:50)  HR: 70 (28 May 2021 05:50) (67 - 75)  BP: 135/65 (28 May 2021 05:50) (113/55 - 135/65)  RR: 18 (28 May 2021 05:50) (18 - 18)    General:[ x  ] NAD, resting comfortable in bed,   [   ] other:                                HEENT: [ x  ] NC/AT, EOMI, PERRL , Normal Conjunctivae,   [  ] other:   Cardio: [  x ] RRR, no murmur   [   ] other:                              Pulm: [ x  ] No Respiratory Distress,  Lungs CTAB,   [   ] other:                       Abdomen: [ x  ] ND/NT, Soft,   [   ] other:    : [ x ] NO MIR CATHETER, [   ] MIR CATHETER- no meatal tear, no discharge, [   ] other:                                            MSK: [ x  ] No joint swelling, Full ROM,   [  ] other:                                  Ext: [ x ]No C/C/E, No calf tenderness (known right DVT),   [   ]other:    Skin: [ x  ]intact,   [   ] other:                                                                   Neurological Examination:  Cognitive: [  x  ] AAO x 3   [    ]  other Right hemineglect                                            Attention:  [    ] intact   [  x  ]  other      mild impairment                     Memory: [     ] intact    [   x ]  other   impaired  Mood/Affect:  [    ] wnl    [   x ]  other  flat affect                                                                        Communication:  [   ] Fluent,  No dysarthria,   [ x   ] other - brief answers with delayed responses.  CN II - XII  [  x  ] intact   [    ] other   Coordination:   [    ] FTN/HTS intact   [ x  ] other  limited exam. Slow response.                                                                       Sensory: [ x  ]Intact to light touch   [    ] other                                                                                        Tone:  [  x  ]  wnl,   [    ]  other    Motor   LEFT    UE: [   ] WNL.  [  x ] other: 4/5   RIGHT UE:  [   ] WNL.  [ x  ] other: 4/5    LEFT    LE:  [   ] WNL.  [ x  ] other: 3+/5   RIGHT LE:  [   ] WNL.  [  x ] other:  3+/5 range     Reflex:  [  x  ]  symmetric,  [    ]  other:  DTRs: [  x ]symmetric, [   ] other:  Coordination:   [    ] intact,   [   x ] other:  difficulty with coordination likely secondary to neglect. left sided preference, difficulty tracking, showing improvement                                                                        Sensory: [  x  ] Intact to light touch,   [    ] other:                 Recent labs:                          11.0   5.46  )-----------( 268      ( 28 May 2021 07:25 )             35.1     05-28    137  |  99  |  12  ----------------------------<  93  4.4   |  28  |  0.8    Ca    9.3      28 May 2021 07:25    TPro  6.2  /  Alb  3.7  /  TBili  0.3  /  DBili  x   /  AST  20  /  ALT  17  /  AlkPhos  91        Urinalysis Basic - ( 28 May 2021 04:55 )    Color: Light Yellow / Appearance: Clear / S.013 / pH: x  Gluc: x / Ketone: Negative  / Bili: Negative / Urobili: <2 mg/dL   Blood: x / Protein: Negative / Nitrite: Negative   Leuk Esterase: Negative / RBC: x / WBC x   Sq Epi: x / Non Sq Epi: x / Bacteria: x    < from: CT Head No Cont (21 @ 19:06) >  IMPRESSION:    Partial interval resorption of the lobar hemorrhages in the bilateral frontal lobes since the prior CT from 2021. Slightly decreased moderate mass effect associated with the right frontal hematoma with near resolution of the midline shift.    Trace residual subarachnoid hemorrhage in the bilateral frontal sulci.    Interval resolution of the right occipital and left temporal intraparenchymal hemorrhages.    < end of copied text >              MEDICATIONS  (STANDING):  amLODIPine   Tablet 5 milliGRAM(s) Oral at bedtime  aspirin enteric coated 81 milliGRAM(s) Oral daily  enoxaparin Injectable 40 milliGRAM(s) SubCutaneous daily  losartan 25 milliGRAM(s) Oral daily  pantoprazole   Suspension 40 milliGRAM(s) Oral daily  senna 2 Tablet(s) Oral at bedtime    MEDICATIONS  (PRN):  acetaminophen   Tablet .. 650 milliGRAM(s) Oral every 6 hours PRN Temp greater or equal to 38C (100.4F), Mild Pain (1 - 3)  bisacodyl Suppository 10 milliGRAM(s) Rectal daily PRN Constipation  magnesium hydroxide Suspension 30 milliLiter(s) Oral daily PRN Constipation  melatonin 3 milliGRAM(s) Oral at bedtime PRN Insomnia

## 2021-05-28 NOTE — CHART NOTE - NSCHARTNOTEFT_GEN_A_CORE
3- day CC results    Day 1 5/15: 627kcal/10.65g PRO     Day 2: 5/16: 177kcal/1.85g PRO -- no dinner documentation     Day 3: 5/17: 231kcal/98g PRO -- no lunch and dinner documentation     Incomplete/limited documentation -- will need to cont to monitor trends
Registered Dietitian Follow-Up     Patient Profile Reviewed                           Yes [x]   No []     Nutrition History Previously Obtained        Yes [x]  No []       Pertinent Subjective Information: pt's son at bedside -- pt himself not very talkative, even in his own preferred language. son reports that pt ate really well yesterday when he brought him food from home. doesn't like the hospital food much, but does better w/ home food. pt like foods like apple juice, mashed potatoes -- but mostly enjoys  food. discussed addition of nutritional supplements w/ son -- reports pt doesn't like pudding/jello, willing to try ensure enlive on bedside and beneprotein powder -- instructed son how to add to foods.      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   # Dysphagia: Was on NGT feeds. Just started PO Dysphagia 2 with nectar-thick liquid  feeds. Monitor intake.  # Hypernatremia/ Azotemia - resolved--Continue to monitor     Diet order: Diet, Dysphagia 2 Mechanical Soft-Nectar Consistency Fluid:   Lacto Veg (Accepts Milk & Milk Products)     Qty per Day:  VANILLA FLAVOR     Qty per Day:  w/ THICKENER PACKETS  Supplement Feeding Modality:  Oral  Ensure Enlive Servings Per Day:  1       Frequency:  Two Times a day (05-14-21 @ 11:35) [Active]    Anthropometrics:  Height (cm): 162.6 (05-13-21 @ 17:33), 157.7 (05-12-21 @ 16:04)  Weight (kg): 53 (05-12-21 @ 16:04), 50.4 (05-13) vs. RD bedscale wt 49.5kg -- wt loss?? will need to continue to monitor courtney + wt   BMI (kg/m2): 20 (05-13-21 @ 17:33), 21.3 (05-12-21 @ 16:04)  IBW: 43.1kg    MEDICATIONS  (STANDING):  amLODIPine   Tablet 5 milliGRAM(s) Oral at bedtime  aspirin enteric coated 81 milliGRAM(s) Oral daily  enoxaparin Injectable 40 milliGRAM(s) SubCutaneous daily  losartan 25 milliGRAM(s) Oral daily  metoprolol tartrate 25 milliGRAM(s) Oral two times a day  pantoprazole   Suspension 40 milliGRAM(s) Oral daily  senna 2 Tablet(s) Oral at bedtime    MEDICATIONS  (PRN):  bisacodyl Suppository 10 milliGRAM(s) Rectal daily PRN Constipation  magnesium hydroxide Suspension 30 milliLiter(s) Oral daily PRN Constipation    Pertinent Labs: (5/17) H/H: 11.7/36.6, vit B12: 217    Physical Findings:  - Appearance: AAOX4; no edema noted per RN flowsheets   - GI function: WDL, LBM 5/17 per EMR   - Tubes: n/a   - Oral/Mouth cavity: tolerating current diet texture per son   - Skin: R groin IVC filter per RN flowsheets      Nutrition Requirements: per initial Rd assessment 5/14  Weight Used: 50.4kg      Estimated Energy Needs    Continue [x]  Adjust []  1512-1764kcal (30-35kcal d/t moderate PCM + wt loss)     Estimated Protein Needs    Continue [x]  Adjust []  60-76g/kg (1.2-1.5g/kg -- same reason as above)    Estimated Fluid Needs        Continue [x]  Adjust []  1mL/kcal or LIP      Nutrient Intake: per son, po is improving; EMR Po 5/16 20-40% -- also noted pt getting food from home in EMR -- will cont to monitor      [] Previous Nutrition Diagnosis: mod PCM; increased nutrient need (protein,energy) -- ongoing     Nutrition Intervention: meals and snacks, medical food supplements      Goal/Expected Outcome: pt to consume/tolerate ~/>75% of meals/snacks and po supplements in 3 days.      Indicator/Monitoring: diet order,energy intake,body composition,NFPF     Recommendation:  1. add beneprotein BID to current diet order   2. continue w/ current diet order   3. Add daily MVI if medically feasible   4. obtain daily wts   5. max encouragement + assistance appreciated w/ meals    [id number]  pt @ risk, RD to f/u in 3 days.   RD remains available: Ce Bundy [phone]
Registered Dietitian Follow-Up     Patient Profile Reviewed                           Yes [x]   No []     Nutrition History Previously Obtained        Yes [x]  No []       Pertinent Subjective Information: spoke w/ staff -- Pt ate >50% of BF. Gets food from home for lunch and dinner when family comes to visit. Pt has been receiving eggs over the weekend, noted to be vegetarian, family refuses for pt to receive eggs -- will discuss w/ kitchen.      Pertinent Medical Interventions:  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  # Dysphagia: Dysphagia 2 with nectar-thick liquid  feeds. Monitor intake.     Diet order: Diet, Dysphagia 2 Mechanical Soft-Nectar Consistency Fluid:   Lacto Veg (Accepts Milk & Milk Products)     Qty per Day:  w/ THICKENER PKTS  Beneprotein ([facility] Only)     Qty per Day:  BID  Supplement Feeding Modality:  Oral  Ensure Enlive Servings Per Day:  1       Frequency:  Two Times a day (05-18-21 @ 15:45) [Active]    Anthropometrics:  Height (cm): 162.6 (05-13-21 @ 17:33), 157.7 (05-12-21 @ 16:04)  Weight (kg): 53 (05-12-21 @ 16:04), 50.4 (05-13) vs. RD bedscale wt 49.5kg -- wt loss?? will need to continue to monitor trend + wt; OOB in chair today  BMI (kg/m2): 20 (05-13-21 @ 17:33), 21.3 (05-12-21 @ 16:04)  IBW: 43.1kg    MEDICATIONS  (STANDING):  amLODIPine   Tablet 5 milliGRAM(s) Oral at bedtime  aspirin enteric coated 81 milliGRAM(s) Oral daily  cyanocobalamin Injectable 1000 MICROGram(s) SubCutaneous daily  enoxaparin Injectable 40 milliGRAM(s) SubCutaneous daily  escitalopram 10 milliGRAM(s) Oral at bedtime  losartan 25 milliGRAM(s) Oral daily  pantoprazole   Suspension 40 milliGRAM(s) Oral daily  senna 2 Tablet(s) Oral at bedtime    MEDICATIONS  (PRN):  bisacodyl Suppository 10 milliGRAM(s) Rectal daily PRN Constipation  magnesium hydroxide Suspension 30 milliLiter(s) Oral daily PRN Constipation    Pertinent Labs: (5/24) H/H: 11.4/36.6    Physical Findings:  - Appearance: Alert/confused; no edema noted per RN flowsheets  - GI function: non-distended, no discomfort reported, LBM 5/22 per EMR   - Tubes: n/a   - Oral/Mouth cavity: tolerating current diet texture per staff   - Skin: R groin IVC filter per RN flowsheets     Nutrition Requirements: per initial Rd assessment 5/14  Weight Used: 50.4kg      Estimated Energy Needs    Continue [x]  Adjust []  1512-1764kcal (30-35kcal d/t moderate PCM + wt loss)     Estimated Protein Needs    Continue [x]  Adjust []  60-76g/kg (1.2-1.5g/kg -- same reason as above)    Estimated Fluid Needs        Continue [x]  Adjust []  1mL/kcal or LIP      Nutrient Intake: 50% for breakfast this morning, po/appetite improving, EMR PO doc: 5/23: 70-80%     [] Previous Nutrition Diagnosis: mod PCM; increased nutrient need (protein, energy) -- ongoing, but improving     Nutrition Intervention: meals and snacks, coordination of care       Goal/Expected Outcome: pt to consume/tolerate ~/>75% of meals/snacks and po supplements in 3 days.      Indicator/Monitoring: diet order,energy intake,body composition,NFPF    Recommendation:  1. continue w/ current diet order + supplements  2. obtain daily wts   3. max encouragement + assistance appreciated w/ meals    [id number]  pt @ risk, will cont to monitor po intake, RD to f/u in 3 days.   RD remains available: Ce Bundy [phone].
Registered Dietitian Follow-Up     Patient Profile Reviewed                           Yes [x]   No []     Nutrition History Previously Obtained        Yes [x]  No []       Pertinent Subjective Information: staff reports pt really well for both breakfast and lunch today, >75% of BF. Not sure of supplement intake. Overall report of improvement in po reported.      Pertinent Medical Interventions:  #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   # Dysphagia: Dysphagia 2 with nectar-thick liquid  feeds. Monitor intake.  # Hypernatremia/ Azotemia - resolved    Diet order: Diet, Dysphagia 2 Mechanical Soft-Nectar Consistency Fluid:   Lacto Veg (Accepts Milk & Milk Products)     Qty per Day:  w/ THICKENER PKTS  Beneprotein ([facility] Only)     Qty per Day:  BID  Supplement Feeding Modality:  Oral  Ensure Enlive Servings Per Day:  1       Frequency:  Two Times a day (05-18-21 @ 15:45) [Active]    Anthropometrics:  Height (cm): 162.6 (05-13-21 @ 17:33), 157.7 (05-12-21 @ 16:04)  Weight (kg): 53 (05-12-21 @ 16:04), 50.4 (05-13) vs. RD bedscale wt 49.5kg -- wt loss?? will need to continue to monitor trend + wt; OOB in chair today  BMI (kg/m2): 20 (05-13-21 @ 17:33), 21.3 (05-12-21 @ 16:04)  IBW: 43.1kg    MEDICATIONS  (STANDING):  amLODIPine   Tablet 5 milliGRAM(s) Oral at bedtime  aspirin enteric coated 81 milliGRAM(s) Oral daily  cyanocobalamin Injectable 1000 MICROGram(s) SubCutaneous daily  enoxaparin Injectable 40 milliGRAM(s) SubCutaneous daily  losartan 25 milliGRAM(s) Oral daily  pantoprazole   Suspension 40 milliGRAM(s) Oral daily  senna 2 Tablet(s) Oral at bedtime    MEDICATIONS  (PRN):  bisacodyl Suppository 10 milliGRAM(s) Rectal daily PRN Constipation  magnesium hydroxide Suspension 30 milliLiter(s) Oral daily PRN Constipation    Pertinent Labs: no new labs since prev assessment    Physical Findings:  - Appearance: Alert/disoriented to situation; no edema noted per RN flowsheets   - GI function: WDL, feacl incontinence 5/20 per EMR   - Tubes: n/a   - Oral/Mouth cavity: tolerating current diet texture per staff   - Skin: R groin IVC filter per RN flowsheets     Nutrition Requirements: per initial Rd assessment 5/14  Weight Used: 50.4kg      Estimated Energy Needs    Continue [x]  Adjust []  1512-1764kcal (30-35kcal d/t moderate PCM + wt loss)     Estimated Protein Needs    Continue [x]  Adjust []  60-76g/kg (1.2-1.5g/kg -- same reason as above)    Estimated Fluid Needs        Continue [x]  Adjust []  1mL/kcal or LIP      Nutrient Intake: improved per staff, EMR PO doc 5/19-5/20: 20-80%      [] Previous Nutrition Diagnosis: mod PCM; increased nutrient need (protein,energy) -- ongoing, but improving     Nutrition Intervention: meals and snacks, coordination of care       Goal/Expected Outcome: pt to consume/tolerate ~/>75% of meals/snacks and po supplements in 4 days.      Indicator/Monitoring: diet order,energy intake,body composition,NFPF     Recommendation:  1. continue w/ current diet order + supplements  2. obtain daily wts   3. max encouragement + assistance appreciated w/ meals    [id number]  pt @ risk, will cont to monitor po intake, RD to f/u in 4 days.   RD remains available: Ce Bundy [phone]
Registered Dietitian Follow-Up     Patient Profile Reviewed                           Yes [x]   No []     Nutrition History Previously Obtained        Yes [x]  No []       Pertinent Subjective Information: spoke w/ staff -- reports is eating better, also receives food from home. Pt's son at bedside -- says appetite/po has improved. He likes to eat lunch/dinner which he brings from home. But says pt also eats some foods at hospital like mashed potatoes, carrots, veggie selena. Also, pt is drinking the ensure enlive and likes them.       Pertinent Medical Interventions:  #Rehab of stroke and bilateral ICH s/p TPA with right hemiparesis (dominant), severe abulia and cognitive impairment and dysphagia/ language impairment     Diet order: Diet, Dysphagia 3 Soft-Thin Liquids:   Lacto Veg (Accepts Milk & Milk Products)  Beneprotein ([facility] Only)     Qty per Day:  2  Supplement Feeding Modality:  Oral  Ensure Enlive Servings Per Day:  1       Frequency:  Two Times a day (05-26-21 @ 11:41) [Active]    Anthropometrics:  Height (cm): 162.6 (05-13-21 @ 17:33), 157.7 (05-12-21 @ 16:04)  Weight (kg): 53 (05-12-21 @ 16:04), 50.4 (05-13) vs. RD bedscale wt 49.5kg -- wt loss?? will need to continue to monitor trend + wt; OOB in chair today  BMI (kg/m2): 20 (05-13-21 @ 17:33), 21.3 (05-12-21 @ 16:04)  IBW: 43.1kg    MEDICATIONS  (STANDING):  amLODIPine   Tablet 5 milliGRAM(s) Oral at bedtime  aspirin enteric coated 81 milliGRAM(s) Oral daily  enoxaparin Injectable 40 milliGRAM(s) SubCutaneous daily  losartan 25 milliGRAM(s) Oral daily  pantoprazole   Suspension 40 milliGRAM(s) Oral daily  senna 2 Tablet(s) Oral at bedtime    MEDICATIONS  (PRN):  bisacodyl Suppository 10 milliGRAM(s) Rectal daily PRN Constipation  magnesium hydroxide Suspension 30 milliLiter(s) Oral daily PRN Constipation    Pertinent Labs: 05-28 @ 07:25: Na 137, BUN 12, Cr 0.8, BG 93, K+ 4.4, Phos --, Mg --, Alk Phos 91, ALT/SGPT 17, AST/SGOT 20, HbA1c --    Physical Findings:  - Appearance: Alert/confused; no edema noted per RN flowsheets  - GI function: no discomfort reported, LBM 5/26 per EMR   - Tubes: n/a   - Oral/Mouth cavity: tolerating current diet texture per staff   - Skin: R groin IVC filter per RN flowsheets     Nutrition Requirements: per initial Rd assessment 5/14  Weight Used: 50.4kg      Estimated Energy Needs    Continue [x]  Adjust []  1512-1764kcal (30-35kcal d/t moderate PCM + wt loss)     Estimated Protein Needs    Continue [x]  Adjust []  60-76g/kg (1.2-1.5g/kg -- same reason as above)    Estimated Fluid Needs        Continue [x]  Adjust []  1mL/kcal or LIP     Nutrient Intake: son reports good po w/ some supplement intake; staff report >50% po intake for lunch yesterday      [] Previous Nutrition Diagnosis: mod PCM; increased nutrient need (protein, energy) -- improved    Nutrition Intervention: meals and snacks, coordination of care       Goal/Expected Outcome: pt to consume/tolerate ~/>75% of meals/snacks and po supplements in 7 days.      Indicator/Monitoring: diet order,energy intake,body composition,NFPF    Recommendation:  1. continue w/ current diet order + supplements  2. obtain daily wts   3. max encouragement + assistance appreciated w/ meals  4. Add daily MVI if medically feasible     [id number]  All interventions in place, RD to f/u in 7 days.   RD remains available: Ce Bundy [phone].

## 2021-05-28 NOTE — PROGRESS NOTE ADULT - ASSESSMENT
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 Good acute rehab candidate. Right hemineglect or right sided inattention.   Patient requires at least 3hours, 5 days a week, of acute inpatient rehab including PT, OT, and speech.     # lethargy/ Abulia - some decline today  -amantadine 100 BID, improving  - d/c lexapro    #Dysthymia  - Will d/c Lexapro and monitor    # Left DVT in the external iliac, common femoral veins  -IVC filter placed 5/12 due to complication of hemorrhagic conversion of infarction after administration of TPA. Not an A/C candidate  -lovenox 40     # Dysphagia: Dysphagia 3 with nectar-thick liquid  feeds. Monitor intake.    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 Good acute rehab candidate. Right hemineglect or right sided inattention.   Patient requires at least 3hours, 5 days a week, of acute inpatient rehab including PT, OT, and speech.     # lethargy/ Abulia - Doing better again off Lexapro  -amantadine 100 BID, improving    # Left DVT in the external iliac, common femoral veins  -IVC filter placed 5/12 due to complication of hemorrhagic conversion of infarction after administration of TPA. Not an A/C candidate  -lovenox 40     # Dysphagia: Dysphagia 3 with nectar-thick liquid  feeds. Monitor intake.    # HTN : SBP goal 120s - 150s  - Stable  - norvasc 10  - lopressor 25 bid     #B-12 deficient:  -on cyanocobalamin injectable until 5/25/21    #Pain control:   - Tylenol PRN    -GI/Bowel Mgmt    pantoprazole 40 mg Oral daily    -Bladder management: condom cath     -Skin:  -No active issues at this time    -FEN: Monitor intake on current diet       Precautions / PROPHYLAXIS:    - Falls  - Ortho: Weight bearing status: all limbs clear  - DVT: enoxaparin Injectable 40 mg SubQ daily and IVC filter   - Seizure proph: Keppra

## 2021-05-28 NOTE — CHART NOTE - NSCHARTNOTESELECT_GEN_ALL_CORE
Dietitian f/u/Event Note
Calorie Count/Event Note
Dietitian F/u/Event Note

## 2021-05-29 RX ADMIN — Medication 81 MILLIGRAM(S): at 11:13

## 2021-05-29 RX ADMIN — ENOXAPARIN SODIUM 40 MILLIGRAM(S): 100 INJECTION SUBCUTANEOUS at 11:13

## 2021-05-29 RX ADMIN — PANTOPRAZOLE SODIUM 40 MILLIGRAM(S): 20 TABLET, DELAYED RELEASE ORAL at 11:13

## 2021-05-29 RX ADMIN — SENNA PLUS 2 TABLET(S): 8.6 TABLET ORAL at 21:33

## 2021-05-29 RX ADMIN — LOSARTAN POTASSIUM 25 MILLIGRAM(S): 100 TABLET, FILM COATED ORAL at 06:14

## 2021-05-29 NOTE — PROGRESS NOTE ADULT - SUBJECTIVE AND OBJECTIVE BOX
MEDICATIONS  (STANDING):  amLODIPine   Tablet 5 milliGRAM(s) Oral at bedtime  aspirin enteric coated 81 milliGRAM(s) Oral daily  enoxaparin Injectable 40 milliGRAM(s) SubCutaneous daily  losartan 25 milliGRAM(s) Oral daily  pantoprazole   Suspension 40 milliGRAM(s) Oral daily  senna 2 Tablet(s) Oral at bedtime    MEDICATIONS  (PRN):  acetaminophen   Tablet .. 650 milliGRAM(s) Oral every 6 hours PRN Temp greater or equal to 38C (100.4F), Mild Pain (1 - 3)  bisacodyl Suppository 10 milliGRAM(s) Rectal daily PRN Constipation  magnesium hydroxide Suspension 30 milliLiter(s) Oral daily PRN Constipation  melatonin 3 milliGRAM(s) Oral at bedtime PRN Insomnia      Patient was stable overnight and expresses no new complaints. States through  that speech is better.    T(C): 35.8 (05-29-21 @ 06:18), Max: 36.7 (05-28-21 @ 22:22)  HR: 68 (05-29-21 @ 06:18) (68 - 80)  BP: 147/67 (05-29-21 @ 06:18) (106/55 - 147/67)  RR: 18 (05-29-21 @ 06:18) (18 - 19)  SpO2: 98% (05-28-21 @ 22:22) (98% - 98%)      PE:    Alert   LUNGS- clear  COR- RRR S1S2  ABD- SOFT, NT  EXTR- w/o edema  NEURO- stable    05-28    137  |  99  |  12  ----------------------------<  93  4.4   |  28  |  0.8    Ca    9.3      28 May 2021 07:25    TPro  6.2  /  Alb  3.7  /  TBili  0.3  /  DBili  x   /  AST  20  /  ALT  17  /  AlkPhos  91  05-28                            11.0   5.46  )-----------( 268      ( 28 May 2021 07:25 )             35.1             Rehab for bilateral CVA with right hemiparesis and aphasia    Continue full acute rehab program.

## 2021-05-29 NOTE — PROGRESS NOTE ADULT - NUTRITIONAL ASSESSMENT
This patient has been assessed with a concern for Malnutrition and has been determined to have a diagnosis/diagnoses of Moderate protein-calorie malnutrition.    This patient is being managed with:   Diet Dysphagia 3 Soft-Thin Liquids-  Lacto Veg (Accepts Milk & Milk Products)  Beneprotein ([facility] Only)     Qty per Day:  2  Supplement Feeding Modality:  Oral  Ensure Enlive Servings Per Day:  1       Frequency:  Two Times a day  Entered: May 26 2021 11:40AM

## 2021-05-30 RX ADMIN — PANTOPRAZOLE SODIUM 40 MILLIGRAM(S): 20 TABLET, DELAYED RELEASE ORAL at 11:16

## 2021-05-30 RX ADMIN — SENNA PLUS 2 TABLET(S): 8.6 TABLET ORAL at 22:04

## 2021-05-30 RX ADMIN — AMLODIPINE BESYLATE 5 MILLIGRAM(S): 2.5 TABLET ORAL at 22:03

## 2021-05-30 RX ADMIN — LOSARTAN POTASSIUM 25 MILLIGRAM(S): 100 TABLET, FILM COATED ORAL at 06:04

## 2021-05-30 RX ADMIN — Medication 81 MILLIGRAM(S): at 11:16

## 2021-05-30 RX ADMIN — ENOXAPARIN SODIUM 40 MILLIGRAM(S): 100 INJECTION SUBCUTANEOUS at 11:16

## 2021-05-30 NOTE — PROGRESS NOTE ADULT - NUTRITIONAL ASSESSMENT
This patient has been assessed with a concern for Malnutrition and has been determined to have a diagnosis/diagnoses of Moderate protein-calorie malnutrition.    This patient is being managed with:   Diet Dysphagia 3 Soft-Thin Liquids-  Lacto Veg (Accepts Milk & Milk Products)  Beneprotein ([facility] Only)     Qty per Day:  2  Supplement Feeding Modality:  Oral  Ensure Enlive Servings Per Day:  1       Frequency:  Two Times a day  Entered: May 26 2021 11:40AM

## 2021-05-30 NOTE — PROGRESS NOTE ADULT - ASSESSMENT
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 Good acute rehab candidate. Right hemineglect or right sided inattention.   Patient requires at least 3hours, 5 days a week, of acute inpatient rehab including PT, OT, and speech.     # lethargy/ Abulia - Doing better again off Lexapro  -amantadine 100 BID, improving    # Left DVT in the external iliac, common femoral veins  -IVC filter placed 5/12 due to complication of hemorrhagic conversion of infarction after administration of TPA. Not an A/C candidate  -lovenox 40     # Dysphagia: Dysphagia 3 with nectar-thick liquid  feeds. Monitor intake.    # HTN : SBP goal 120s - 150s  - Stable  - norvasc 10  - lopressor 25 bid     #B-12 deficient:  -on cyanocobalamin injectable until 5/25/21    #Pain control:   - Tylenol PRN    -GI/Bowel Mgmt    pantoprazole 40 mg Oral daily    -Bladder management: condom cath     -Skin:  -No active issues at this time    -FEN: Monitor intake on current diet       Precautions / PROPHYLAXIS:    - Falls  - Ortho: Weight bearing status: all limbs clear  - DVT: enoxaparin Injectable 40 mg SubQ daily and IVC filter   - Seizure proph: Keppra

## 2021-05-30 NOTE — PROGRESS NOTE ADULT - SUBJECTIVE AND OBJECTIVE BOX
HPI:    86 year old right handed gentleman with no significant PMH presented to the ED with his granddaughter. Patient last known well at 11:30 AM. Patient found to be aphasic with right sided weakness at around noon.   Stroke code and code NI called on arrival. Patient with NIHSS 18. CTH without intracranial pathology. Patient given IV TPA at 13:23. He was hypertensive on presentation () and was given Labetalol 20 mg IVP and started on nicardipine drip in MICU  (03 May 2021 16:17). 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 No NSx intervention. Stability scan shows stable bleed. vEEG negative for subclinical seizures. Patient found to have right LE DVT. Pt is not a candidate for further anticoagulation, so IVC filter was placed . Pt tolerated procedure well. Pt was revaluated by speech and was advanced to mechanical soft diet with nectar thick fluids at that time. He was evaluated by PT, OT and was able to stand and ambulate short distances with mod assistance. He was seen by Physiatry on the Stroke service and was found to be a good acute inpatient rehab candidate.      Today's subjective:  Patient seen and evaluated this morning at bedside with attending physician. Patient reported no new complaints. No acute events overnight.      CLOF: Amb 150ft RW supervision/ CG, bed mobility transfers CG    PHYSICAL EXAMINATION     Vital Signs Last 24 Hrs  ICU Vital Signs Last 24 Hrs  T(C): 36.2 (30 May 2021 06:03), Max: 36.6 (29 May 2021 12:31)  T(F): 97.1 (30 May 2021 06:03), Max: 97.8 (29 May 2021 12:31)  HR: 71 (30 May 2021 06:03) (71 - 77)  BP: 143/68 (30 May 2021 06:03) (119/58 - 143/68)  RR: 18 (30 May 2021 06:03) (18 - 18)  SpO2: --      General:[ x  ] NAD, resting comfortable in bed,   [   ] other:                                HEENT: [ x  ] NC/AT, EOMI, PERRL , Normal Conjunctivae,   [  ] other:   Cardio: [  x ] RRR, no murmur   [   ] other:                              Pulm: [ x  ] No Respiratory Distress,  Lungs CTAB,   [   ] other:                       Abdomen: [ x  ] ND/NT, Soft,   [   ] other:    : [ x ] NO MIR CATHETER, [   ] MIR CATHETER- no meatal tear, no discharge, [   ] other:                                            MSK: [ x  ] No joint swelling, Full ROM,   [  ] other:                                  Ext: [ x ]No C/C/E, No calf tenderness (known right DVT),   [   ]other:    Skin: [ x  ]intact,   [   ] other:                                                                   Neurological Examination:  Cognitive: [  x  ] AAO x 3   [    ]  other Right hemineglect                                            Attention:  [    ] intact   [  x  ]  other      mild impairment                     Memory: [     ] intact    [   x ]  other   impaired  Mood/Affect:  [    ] wnl    [   x ]  other  flat affect                                                                        Communication:  [   ] Fluent,  No dysarthria,   [ x   ] other - brief answers with delayed responses.  CN II - XII  [  x  ] intact   [    ] other   Coordination:   [    ] FTN/HTS intact   [ x  ] other  limited exam. Slow response.                                                                       Sensory: [ x  ]Intact to light touch   [    ] other                                                                                        Tone:  [  x  ]  wnl,   [    ]  other    Motor   LEFT    UE: [   ] WNL.  [  x ] other: 4/5   RIGHT UE:  [   ] WNL.  [ x  ] other: 4/5    LEFT    LE:  [   ] WNL.  [ x  ] other: 3+/5   RIGHT LE:  [   ] WNL.  [  x ] other:  3+/5 range     Reflex:  [  x  ]  symmetric,  [    ]  other:  DTRs: [  x ]symmetric, [   ] other:  Coordination:   [    ] intact,   [   x ] other:  difficulty with coordination likely secondary to neglect. left sided preference, difficulty tracking, showing improvement                                                                        Sensory: [  x  ] Intact to light touch,   [    ] other:                 Recent labs:                          11.0   5.46  )-----------( 268      ( 28 May 2021 07:25 )             35.1         137  |  99  |  12  ----------------------------<  93  4.4   |  28  |  0.8    Ca    9.3      28 May 2021 07:25    TPro  6.2  /  Alb  3.7  /  TBili  0.3  /  DBili  x   /  AST  20  /  ALT  17  /  AlkPhos  91        Urinalysis Basic - ( 28 May 2021 04:55 )    Color: Light Yellow / Appearance: Clear / S.013 / pH: x  Gluc: x / Ketone: Negative  / Bili: Negative / Urobili: <2 mg/dL   Blood: x / Protein: Negative / Nitrite: Negative   Leuk Esterase: Negative / RBC: x / WBC x   Sq Epi: x / Non Sq Epi: x / Bacteria: x    < from: CT Head No Cont (21 @ 19:06) >  IMPRESSION:    Partial interval resorption of the lobar hemorrhages in the bilateral frontal lobes since the prior CT from 2021. Slightly decreased moderate mass effect associated with the right frontal hematoma with near resolution of the midline shift.    Trace residual subarachnoid hemorrhage in the bilateral frontal sulci.    Interval resolution of the right occipital and left temporal intraparenchymal hemorrhages.    < end of copied text >              MEDICATIONS  (STANDING):  amLODIPine   Tablet 5 milliGRAM(s) Oral at bedtime  aspirin enteric coated 81 milliGRAM(s) Oral daily  enoxaparin Injectable 40 milliGRAM(s) SubCutaneous daily  losartan 25 milliGRAM(s) Oral daily  pantoprazole   Suspension 40 milliGRAM(s) Oral daily  senna 2 Tablet(s) Oral at bedtime    MEDICATIONS  (PRN):  acetaminophen   Tablet .. 650 milliGRAM(s) Oral every 6 hours PRN Temp greater or equal to 38C (100.4F), Mild Pain (1 - 3)  bisacodyl Suppository 10 milliGRAM(s) Rectal daily PRN Constipation  magnesium hydroxide Suspension 30 milliLiter(s) Oral daily PRN Constipation  melatonin 3 milliGRAM(s) Oral at bedtime PRN Insomnia

## 2021-05-31 LAB
ALBUMIN SERPL ELPH-MCNC: 4 G/DL — SIGNIFICANT CHANGE UP (ref 3.5–5.2)
ALP SERPL-CCNC: 98 U/L — SIGNIFICANT CHANGE UP (ref 30–115)
ALT FLD-CCNC: 18 U/L — SIGNIFICANT CHANGE UP (ref 0–41)
ANION GAP SERPL CALC-SCNC: 7 MMOL/L — SIGNIFICANT CHANGE UP (ref 7–14)
AST SERPL-CCNC: 21 U/L — SIGNIFICANT CHANGE UP (ref 0–41)
BILIRUB SERPL-MCNC: 0.2 MG/DL — SIGNIFICANT CHANGE UP (ref 0.2–1.2)
BUN SERPL-MCNC: 14 MG/DL — SIGNIFICANT CHANGE UP (ref 10–20)
CALCIUM SERPL-MCNC: 9.3 MG/DL — SIGNIFICANT CHANGE UP (ref 8.5–10.1)
CHLORIDE SERPL-SCNC: 99 MMOL/L — SIGNIFICANT CHANGE UP (ref 98–110)
CO2 SERPL-SCNC: 30 MMOL/L — SIGNIFICANT CHANGE UP (ref 17–32)
CREAT SERPL-MCNC: 0.8 MG/DL — SIGNIFICANT CHANGE UP (ref 0.7–1.5)
GLUCOSE SERPL-MCNC: 96 MG/DL — SIGNIFICANT CHANGE UP (ref 70–99)
HCT VFR BLD CALC: 37.5 % — LOW (ref 42–52)
HGB BLD-MCNC: 11.7 G/DL — LOW (ref 14–18)
MAGNESIUM SERPL-MCNC: 2.2 MG/DL — SIGNIFICANT CHANGE UP (ref 1.8–2.4)
MCHC RBC-ENTMCNC: 25.6 PG — LOW (ref 27–31)
MCHC RBC-ENTMCNC: 31.2 G/DL — LOW (ref 32–37)
MCV RBC AUTO: 82.1 FL — SIGNIFICANT CHANGE UP (ref 80–94)
NRBC # BLD: 0 /100 WBCS — SIGNIFICANT CHANGE UP (ref 0–0)
PLATELET # BLD AUTO: 214 K/UL — SIGNIFICANT CHANGE UP (ref 130–400)
POTASSIUM SERPL-MCNC: 4.8 MMOL/L — SIGNIFICANT CHANGE UP (ref 3.5–5)
POTASSIUM SERPL-SCNC: 4.8 MMOL/L — SIGNIFICANT CHANGE UP (ref 3.5–5)
PROT SERPL-MCNC: 6.6 G/DL — SIGNIFICANT CHANGE UP (ref 6–8)
RBC # BLD: 4.57 M/UL — LOW (ref 4.7–6.1)
RBC # FLD: 16.7 % — HIGH (ref 11.5–14.5)
SODIUM SERPL-SCNC: 136 MMOL/L — SIGNIFICANT CHANGE UP (ref 135–146)
WBC # BLD: 5.84 K/UL — SIGNIFICANT CHANGE UP (ref 4.8–10.8)
WBC # FLD AUTO: 5.84 K/UL — SIGNIFICANT CHANGE UP (ref 4.8–10.8)

## 2021-05-31 RX ADMIN — LOSARTAN POTASSIUM 25 MILLIGRAM(S): 100 TABLET, FILM COATED ORAL at 06:25

## 2021-05-31 RX ADMIN — SENNA PLUS 2 TABLET(S): 8.6 TABLET ORAL at 21:34

## 2021-05-31 RX ADMIN — PANTOPRAZOLE SODIUM 40 MILLIGRAM(S): 20 TABLET, DELAYED RELEASE ORAL at 12:12

## 2021-05-31 RX ADMIN — ENOXAPARIN SODIUM 40 MILLIGRAM(S): 100 INJECTION SUBCUTANEOUS at 12:11

## 2021-05-31 RX ADMIN — Medication 81 MILLIGRAM(S): at 12:11

## 2021-05-31 NOTE — PROGRESS NOTE ADULT - NUTRITIONAL ASSESSMENT
This patient has been assessed with a concern for Malnutrition and has been determined to have a diagnosis/diagnoses of Moderate protein-calorie malnutrition.    This patient is being managed with:   Diet Dysphagia 3 Soft-Thin Liquids-  Lacto Veg (Accepts Milk & Milk Products)  Beneprotein ([facility] Only)     Qty per Day:  2  Supplement Feeding Modality:  Oral  Ensure Enlive Servings Per Day:  1       Frequency:  Two Times a day  Entered: May 26 2021 11:40AM

## 2021-05-31 NOTE — PROGRESS NOTE ADULT - ATTENDING COMMENTS
I reviewed the chart and examined the patient with the resident and we discussed the findings and treatment plan.  The patient is tolerating the rehab program well. I agree with the findings and treatment plan above, which I modified as indicated. The patient requires 3 hrs a day of acute inpatient rehab. I reviewed the chart and examined the patient with the resident and we discussed the findings and treatment plan.  The patient is tolerating the rehab program well. I agree with the findings and treatment plan above, which I modified as indicated. The patient requires 3 hrs a day of acute inpatient rehab. Ambulating with CG/ close supervision. Tracking and psychomotor slowing greatly improved. Cont rehab program.

## 2021-05-31 NOTE — PROGRESS NOTE ADULT - SUBJECTIVE AND OBJECTIVE BOX
HPI:    86 year old right handed gentleman with no significant PMH presented to the ED with his granddaughter. Patient last known well at 11:30 AM. Patient found to be aphasic with right sided weakness at around noon.   Stroke code and code NI called on arrival. Patient with NIHSS 18. CTH without intracranial pathology. Patient given IV TPA at 13:23. He was hypertensive on presentation () and was given Labetalol 20 mg IVP and started on nicardipine drip in MICU  (03 May 2021 16:17). 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 No NSx intervention. Stability scan shows stable bleed. vEEG negative for subclinical seizures. Patient found to have right LE DVT. Pt is not a candidate for further anticoagulation, so IVC filter was placed . Pt tolerated procedure well. Pt was revaluated by speech and was advanced to mechanical soft diet with nectar thick fluids at that time. He was evaluated by PT, OT and was able to stand and ambulate short distances with mod assistance. He was seen by Physiatry on the Stroke service and was found to be a good acute inpatient rehab candidate.      Today's subjective:  Patient seen and evaluated this morning at bedside with attending physician. Patient reported no new complaints. No acute events overnight.      CLOF: Amb 150ft RW supervision/ CG, bed mobility transfers CG    PHYSICAL EXAMINATION   Vital Signs Last 24 Hrs  T(C): 36.6 (31 May 2021 12:13), Max: 36.9 (30 May 2021 22:02)  T(F): 97.8 (31 May 2021 12:13), Max: 98.4 (30 May 2021 22:02)  HR: 73 (31 May 2021 12:13) (66 - 88)  BP: 136/68 (31 May 2021 12:13) (130/62 - 149/70)  BP(mean): --  RR: 18 (31 May 2021 12:13) (18 - 20)  SpO2: --    General:[ x  ] NAD, resting comfortable in bed,   [   ] other:                                HEENT: [ x  ] NC/AT, EOMI, PERRL , Normal Conjunctivae,   [  ] other:   Cardio: [  x ] RRR, no murmur   [   ] other:                              Pulm: [ x  ] No Respiratory Distress,  Lungs CTAB,   [   ] other:                       Abdomen: [ x  ] ND/NT, Soft,   [   ] other:    : [ x ] NO MIR CATHETER, [   ] MIR CATHETER- no meatal tear, no discharge, [   ] other:                                            MSK: [ x  ] No joint swelling, Full ROM,   [  ] other:                                  Ext: [ x ]No C/C/E, No calf tenderness (known right DVT),   [   ]other:    Skin: [ x  ]intact,   [   ] other:                                                                   Neurological Examination:  Cognitive: [  x  ] AAO x 3   [    ]  other Right hemineglect                                            Attention:  [    ] intact   [  x  ]  other      mild impairment                     Memory: [     ] intact    [   x ]  other   impaired  Mood/Affect:  [    ] wnl    [   x ]  other  flat affect                                                                        Communication:  [   ] Fluent,  No dysarthria,   [ x   ] other - brief answers with delayed responses.  CN II - XII  [  x  ] intact   [    ] other   Coordination:   [    ] FTN/HTS intact   [ x  ] other  limited exam. Slow response.                                                                       Sensory: [ x  ]Intact to light touch   [    ] other                                                                                        Tone:  [  x  ]  wnl,   [    ]  other    Motor   LEFT    UE: [   ] WNL.  [  x ] other: 4/5   RIGHT UE:  [   ] WNL.  [ x  ] other: 4/5    LEFT    LE:  [   ] WNL.  [ x  ] other: 3+/5   RIGHT LE:  [   ] WNL.  [  x ] other:  3+/5 range     Reflex:  [  x  ]  symmetric,  [    ]  other:  DTRs: [  x ]symmetric, [   ] other:  Coordination:   [    ] intact,   [   x ] other:  difficulty with coordination likely secondary to neglect. left sided preference, difficulty tracking, showing improvement                                                                        Sensory: [  x  ] Intact to light touch,   [    ] other:                 Recent labs:                          11.7   5.84  )-----------( 214      ( 31 May 2021 06:55 )             37.5         136  |  99  |  14  ----------------------------<  96  4.8   |  30  |  0.8    Ca    9.3      31 May 2021 06:55  Mg     2.2         TPro  6.6  /  Alb  4.0  /  TBili  0.2  /  DBili  x   /  AST  21  /  ALT  18  /  AlkPhos  98                Urinalysis Basic - ( 28 May 2021 04:55 )    Color: Light Yellow / Appearance: Clear / S.013 / pH: x  Gluc: x / Ketone: Negative  / Bili: Negative / Urobili: <2 mg/dL   Blood: x / Protein: Negative / Nitrite: Negative   Leuk Esterase: Negative / RBC: x / WBC x   Sq Epi: x / Non Sq Epi: x / Bacteria: x    < from: CT Head No Cont (21 @ 19:06) >  IMPRESSION:    Partial interval resorption of the lobar hemorrhages in the bilateral frontal lobes since the prior CT from 2021. Slightly decreased moderate mass effect associated with the right frontal hematoma with near resolution of the midline shift.    Trace residual subarachnoid hemorrhage in the bilateral frontal sulci.    Interval resolution of the right occipital and left temporal intraparenchymal hemorrhages.    < end of copied text >              MEDICATIONS  (STANDING):  amLODIPine   Tablet 5 milliGRAM(s) Oral at bedtime  aspirin enteric coated 81 milliGRAM(s) Oral daily  enoxaparin Injectable 40 milliGRAM(s) SubCutaneous daily  losartan 25 milliGRAM(s) Oral daily  pantoprazole   Suspension 40 milliGRAM(s) Oral daily  senna 2 Tablet(s) Oral at bedtime    MEDICATIONS  (PRN):  acetaminophen   Tablet .. 650 milliGRAM(s) Oral every 6 hours PRN Temp greater or equal to 38C (100.4F), Mild Pain (1 - 3)  bisacodyl Suppository 10 milliGRAM(s) Rectal daily PRN Constipation  magnesium hydroxide Suspension 30 milliLiter(s) Oral daily PRN Constipation  melatonin 3 milliGRAM(s) Oral at bedtime PRN Insomnia   HPI:    86 year old right handed gentleman with no significant PMH presented to the ED with his granddaughter. Patient last known well at 11:30 AM. Patient found to be aphasic with right sided weakness at around noon.   Stroke code and code NI called on arrival. Patient with NIHSS 18. CTH without intracranial pathology. Patient given IV TPA at 13:23. He was hypertensive on presentation () and was given Labetalol 20 mg IVP and started on nicardipine drip in MICU  (03 May 2021 16:17). 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 No NSx intervention. Stability scan shows stable bleed. vEEG negative for subclinical seizures. Patient found to have right LE DVT. Pt is not a candidate for further anticoagulation, so IVC filter was placed . Pt tolerated procedure well. Pt was revaluated by speech and was advanced to mechanical soft diet with nectar thick fluids at that time. He was evaluated by PT, OT and was able to stand and ambulate short distances with mod assistance. He was seen by Physiatry on the Stroke service and was found to be a good acute inpatient rehab candidate.      Today's subjective:  Patient seen and evaluated this morning at bedside with attending physician. Patient reported no new complaints. No acute events overnight.      CLOF: Amb 150ft RW supervision/ CG, bed mobility transfers CG    PHYSICAL EXAMINATION   Vital Signs Last 24 Hrs  T(C): 36.6 (31 May 2021 12:13), Max: 36.9 (30 May 2021 22:02)  T(F): 97.8 (31 May 2021 12:13), Max: 98.4 (30 May 2021 22:02)  HR: 73 (31 May 2021 12:13) (66 - 88)  BP: 136/68 (31 May 2021 12:13) (130/62 - 149/70)  BP(mean): --  RR: 18 (31 May 2021 12:13) (18 - 20)  SpO2: --    General:[ x  ] NAD, resting comfortable in bed,   [   ] other:                                HEENT: [ x  ] NC/AT, EOMI, PERRL , Normal Conjunctivae,   [  ] other:   Cardio: [  x ] RRR, no murmur   [   ] other:                              Pulm: [ x  ] No Respiratory Distress,  Lungs CTAB,   [   ] other:                       Abdomen: [ x  ] ND/NT, Soft,   [   ] other:    : [ x ] NO MIR CATHETER, [   ] MIR CATHETER- no meatal tear, no discharge, [   ] other:                                            MSK: [ x  ] No joint swelling, Full ROM,   [  ] other:                                  Ext: [ x ]No C/C/E, No calf tenderness (known right DVT),   [   ]other:    Skin: [ x  ]intact,   [   ] other:                                                                   Neurological Examination:  Cognitive: [  x  ] AAO x 3   [    ]  other Right hemineglect, improving                                          Attention:  [    ] intact   [  x  ]  other      mild impairment                     Memory: [     ] intact    [   x ]  other   impaired  Mood/Affect:  [    ] wnl    [   x ]  other  flat affect                                                                        Communication:  [   ] Fluent,  No dysarthria,   [ x   ] other - brief answers with delayed responses.  CN II - XII  [  x  ] intact   [    ] other   Coordination:   [    ] FTN/HTS intact   [ x  ] other  limited exam. Slow response - improving                                                                      Sensory: [ x  ]Intact to light touch   [    ] other                                                                                        Tone:  [  x  ]  wnl,   [    ]  other    Motor   LEFT    UE: [   ] WNL.  [  x ] other: 4/5   RIGHT UE:  [   ] WNL.  [ x  ] other: 4/5    LEFT    LE:  [   ] WNL.  [ x  ] other: 3+/5   RIGHT LE:  [   ] WNL.  [  x ] other:  3+/5 range     Reflex:  [  x  ]  symmetric,  [    ]  other:  DTRs: [  x ]symmetric, [   ] other:  Coordination:   [    ] intact,   [    ] other:                                                                        Sensory: [  x  ] Intact to light touch,   [    ] other:                 Recent labs:                          11.7   5.84  )-----------( 214      ( 31 May 2021 06:55 )             37.5         136  |  99  |  14  ----------------------------<  96  4.8   |  30  |  0.8    Ca    9.3      31 May 2021 06:55  Mg     2.2         TPro  6.6  /  Alb  4.0  /  TBili  0.2  /  DBili  x   /  AST  21  /  ALT  18  /  AlkPhos  98  05-31              Urinalysis Basic - ( 28 May 2021 04:55 )    Color: Light Yellow / Appearance: Clear / S.013 / pH: x  Gluc: x / Ketone: Negative  / Bili: Negative / Urobili: <2 mg/dL   Blood: x / Protein: Negative / Nitrite: Negative   Leuk Esterase: Negative / RBC: x / WBC x   Sq Epi: x / Non Sq Epi: x / Bacteria: x    < from: CT Head No Cont (21 @ 19:06) >  IMPRESSION:    Partial interval resorption of the lobar hemorrhages in the bilateral frontal lobes since the prior CT from 2021. Slightly decreased moderate mass effect associated with the right frontal hematoma with near resolution of the midline shift.    Trace residual subarachnoid hemorrhage in the bilateral frontal sulci.    Interval resolution of the right occipital and left temporal intraparenchymal hemorrhages.    < end of copied text >              MEDICATIONS  (STANDING):  amLODIPine   Tablet 5 milliGRAM(s) Oral at bedtime  aspirin enteric coated 81 milliGRAM(s) Oral daily  enoxaparin Injectable 40 milliGRAM(s) SubCutaneous daily  losartan 25 milliGRAM(s) Oral daily  pantoprazole   Suspension 40 milliGRAM(s) Oral daily  senna 2 Tablet(s) Oral at bedtime    MEDICATIONS  (PRN):  acetaminophen   Tablet .. 650 milliGRAM(s) Oral every 6 hours PRN Temp greater or equal to 38C (100.4F), Mild Pain (1 - 3)  bisacodyl Suppository 10 milliGRAM(s) Rectal daily PRN Constipation  magnesium hydroxide Suspension 30 milliLiter(s) Oral daily PRN Constipation  melatonin 3 milliGRAM(s) Oral at bedtime PRN Insomnia

## 2021-06-01 ENCOUNTER — TRANSCRIPTION ENCOUNTER (OUTPATIENT)
Age: 86
End: 2021-06-01

## 2021-06-01 VITALS
OXYGEN SATURATION: 100 % | DIASTOLIC BLOOD PRESSURE: 55 MMHG | HEART RATE: 92 BPM | TEMPERATURE: 96 F | RESPIRATION RATE: 18 BRPM | SYSTOLIC BLOOD PRESSURE: 145 MMHG

## 2021-06-01 RX ORDER — ATORVASTATIN CALCIUM 80 MG/1
1 TABLET, FILM COATED ORAL
Qty: 30 | Refills: 0
Start: 2021-06-01 | End: 2021-06-30

## 2021-06-01 RX ORDER — METOPROLOL TARTRATE 50 MG
1 TABLET ORAL
Qty: 60 | Refills: 0
Start: 2021-06-01 | End: 2021-06-30

## 2021-06-01 RX ORDER — ASPIRIN/CALCIUM CARB/MAGNESIUM 324 MG
1 TABLET ORAL
Qty: 100 | Refills: 0
Start: 2021-06-01

## 2021-06-01 RX ORDER — LOSARTAN POTASSIUM 100 MG/1
1 TABLET, FILM COATED ORAL
Qty: 30 | Refills: 0
Start: 2021-06-01 | End: 2021-06-30

## 2021-06-01 RX ORDER — AMLODIPINE BESYLATE 2.5 MG/1
1 TABLET ORAL
Qty: 30 | Refills: 0
Start: 2021-06-01 | End: 2021-06-30

## 2021-06-01 RX ADMIN — Medication 81 MILLIGRAM(S): at 11:45

## 2021-06-01 RX ADMIN — PANTOPRAZOLE SODIUM 40 MILLIGRAM(S): 20 TABLET, DELAYED RELEASE ORAL at 11:45

## 2021-06-01 RX ADMIN — PREGABALIN 1000 MICROGRAM(S): 225 CAPSULE ORAL at 05:54

## 2021-06-01 RX ADMIN — ENOXAPARIN SODIUM 40 MILLIGRAM(S): 100 INJECTION SUBCUTANEOUS at 11:45

## 2021-06-01 RX ADMIN — LOSARTAN POTASSIUM 25 MILLIGRAM(S): 100 TABLET, FILM COATED ORAL at 05:54

## 2021-06-01 NOTE — DISCHARGE NOTE PROVIDER - DETAILS OF MALNUTRITION DIAGNOSIS/DIAGNOSES
This patient has been assessed with a concern for Malnutrition and was treated during this hospitalization for the following Nutrition diagnosis/diagnoses:     -  05/14/2021: Moderate protein-calorie malnutrition

## 2021-06-01 NOTE — DISCHARGE NOTE PROVIDER - PROVIDER TOKENS
PROVIDER:[TOKEN:[[id number]],SCHEDULEDAPPT:[06/04/2021],SCHEDULEDAPPTTIME:[08:30 AM]],PROVIDER:[TOKEN:[[id number]],FOLLOWUP:[2 weeks]],PROVIDER:[TOKEN:[[id number]],FOLLOWUP:[1 month]]

## 2021-06-01 NOTE — PROGRESS NOTE ADULT - NSICDXPILOT_GEN_ALL_CORE
[place]

## 2021-06-01 NOTE — PROGRESS NOTE ADULT - ASSESSMENT
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 Good acute rehab candidate. Right hemineglect or right sided inattention.   Patient completed at least 3hours, 5 days a week, of acute inpatient rehab including PT, OT, and speech. Doing well. Appropriate for discharge home today with family.    # lethargy/ Abulia - Doing better  -amantadine 100 BID, improving  - Neuro f/u 2-4 weeks    # Left DVT in the external iliac, common femoral veins  -IVC filter placed 5/12 due to complication of hemorrhagic conversion of infarction after administration of TPA. Not an A/C candidate    # Dysphagia: Dysphagia 3 with nectar-thick liquid  feeds. Monitor intake.    # HTN : SBP goal 120s - 150s  - Stable  - norvasc 10  - losartan 25 bid     #B-12 deficient:  -s/p cyanocobalamin injectable until 5/25/21

## 2021-06-01 NOTE — DISCHARGE NOTE NURSING/CASE MANAGEMENT/SOCIAL WORK - PATIENT PORTAL LINK FT
You can access the FollowMyHealth Patient Portal offered by [facility] by registering at the following website: [email or web address]. By joining [employer]’s FollowMyHealth portal, you will also be able to view your health information using other applications (apps) compatible with our system.

## 2021-06-01 NOTE — DISCHARGE NOTE PROVIDER - NSDCMRMEDTOKEN_GEN_ALL_CORE_FT
acetaminophen 325 mg oral tablet: 2 tab(s) orally every 6 hours, As needed, for pain or fever  amLODIPine 5 mg oral tablet: 1 tab(s) orally once a day (at bedtime)  aspirin 81 mg oral delayed release tablet: 1 tab(s) orally once a day; TAKE WITH FOOD  atorvastatin 80 mg oral tablet: 1 tab(s) orally once a day (at bedtime)  cyanocobalamin: 1000 microgram(s) subcutaneous every 30 days  losartan 25 mg oral tablet: 1 tab(s) orally once a day  melatonin 3 mg oral tablet: 1 tab(s) orally once a day (at bedtime), As needed, Insomnia

## 2021-06-01 NOTE — PROGRESS NOTE ADULT - ATTENDING COMMENTS
I reviewed the chart and examined the patient with the resident and we discussed the findings and treatment plan.  The patient is tolerating the rehab program well. I agree with the findings and treatment plan above, which I modified as indicated.    Medically stable. Ambulates with CS/ CG. Made excellent functional gains. BP stable.   For d/c home with family today.   Soft diet. Activities as tolerated with assistance.   VN home PT, OT, ST.  F/U with Neuro/ stroke in 2-4 weeks. F/u with pMD. Rehab f/u prn.

## 2021-06-01 NOTE — DISCHARGE NOTE PROVIDER - NSDCFUADDINST_GEN_ALL_CORE_FT
***PLEASE BRING THESE DISCHARGE PAPERS WITH YOU TO ALL DOCTOR APPOINTMENTS, AND SHOW THEM TO ALL YOUR DOCTOR AND CAREGIVERS***      ***AMBULATE WITH THE ROLLING WALKER AND ASSISTANCE/SUPERVISION FOR YOUR SAFETY***

## 2021-06-01 NOTE — DISCHARGE NOTE PROVIDER - HOSPITAL COURSE
86 year old right handed gentleman with hx gout, presented to the ED with his granddaughter on 5/3/21. Patient last known well at 11:30 AM. Patient found to be aphasic with right sided weakness at around noon.   Stroke code and code NI called on arrival. Patient with NIHSS 18. CTH without intracranial pathology.  Patient given IV TPA at 13:23. He was hypertensive on presentation () and was given Labetalol 20 mg IVP and started on nicardipine drip (now off), was admitted to MICU.  Ct scan post TPA showed ICH. Pt placed on seizure prophylaxis.  MRI and repeat CT were consistent with bifrontal bilateral hemorrhages (without clinical worsening), which are likely 2/2 to hemorrhagic conversion of infarction after administration of TPA; also amyloid angiopathy. No Neurosurgeical intervention was needed, as evacuation will not likely improve the patient's functional neurological prognosis. Stability scan showed stable bleed. vEEG overnight was negative for subclinical seizures. He was seen by SLP and made NPO, fed with NGT. He was treated with keppra x 8 days for seizue prophylaxis, then d/c'd.    Pt later developed pain in R calf and was found to have DVTs in L external iliac and  L common femoral veins. Since he was not a candidate for further anticoagulation due to b/l ICH and angiopathy, an IVC filter was placed 5/12. Pt tolerated procedure well. Pt was revaluated by speech and was advanced to soft food with nectar thick fluids. pt was cleared for D/C to rehab unit, where he was admitted on 5/13/21, after being evaluated by Physiatrist and PT/OT/SLP.     While on Rehab service, the patient participated in 3 hours of therapy at least 5 days a week.   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 Good acute rehab candidate. Right hemineglect or right sided inattention.   Patient completed at least 3hours, 5 days a week, of acute inpatient rehab including PT, OT, and speech. Doing well. Appropriate for discharge home today with family.    # lethargy/ Abulia - Doing better  - Neuro f/u 2-4 weeks  - patient received trial of a few SSRI's: first tried Paxil and then lexapro, but he did not do well, his function declined after 2 days, so they were d/c'd  - NO SSRI's    # Left DVT in the external iliac, common femoral veins  -IVC filter placed 5/12 due to complication of hemorrhagic conversion of infarction after administration of TPA. Not an A/C candidate    # Dysphagia: DIet was upgraded to: Dysphagia 3 with thin liquids       - doing better, feeding himself with supervision    # HTN : SBP goal 120s - 150s  - Stable  - norvasc 5mg po q hs  - losartan 25 mg daily  - metoprolol was d/c'd due to low BP     #B-12 deficient:  -s/p cyanocobalamin injectable x 7 days, last injection given 6/1; then every 4 weeks,  next injection due 6/29/21  - next injection can be given by visiting nurse or at medical clinic [facility] building    # borderline hypothyroid:   - TSH 5/17 = 8.32, repeat 5/24 = 9.08   - follow up with Dr. Santos at medical clinic on 6/4/21    The patient is being discharged home with home care on 6/1/21.  He has an appointment at the [facility] clinic on Friday, June 4, 2021 at 8:30AM to see Dr. Santso.  He will also need to follow up at Stroke clinic in about 4 weeks.

## 2021-06-01 NOTE — DISCHARGE NOTE PROVIDER - CARE PROVIDER_API CALL
Eliseo Santos)  Internal Medicine  [address] [facility], [address]st [place] [address]  Phone: [phone]  Fax: [phone]  Scheduled Appointment: 06/04/2021 08:30 AM    Isak Hung)  Neurology  [address] [place] [address]  Phone: [phone]  Fax: [phone]  Follow Up Time: 2 weeks    Tan Mayer  Vascular Surgery  [address] [place] [address]  Phone: [phone]  Fax: [phone]  Follow Up Time: 1 month

## 2021-06-01 NOTE — DISCHARGE NOTE PROVIDER - CARE PROVIDERS DIRECT ADDRESSES
[email or web address]

## 2021-06-01 NOTE — DISCHARGE NOTE OB - PATIENT PORTAL LINK FT
You can access the FollowMyHealth Patient Portal offered by [facility] by registering at the following website: [email or web address]. By joining [facility]’s FollowMyHealth portal, you will also be able to view your health information using other applications (apps) compatible with our system.

## 2021-06-01 NOTE — PROGRESS NOTE ADULT - SUBJECTIVE AND OBJECTIVE BOX
HPI:    86 year old right handed gentleman with no significant PMH presented to the ED with his granddaughter. Patient last known well at 11:30 AM. Patient found to be aphasic with right sided weakness at around noon.   Stroke code and code NI called on arrival. Patient with NIHSS 18. CTH without intracranial pathology. Patient given IV TPA at 13:23. He was hypertensive on presentation () and was given Labetalol 20 mg IVP and started on nicardipine drip in MICU  (03 May 2021 16:17). 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 No NSx intervention. Stability scan shows stable bleed. vEEG negative for subclinical seizures. Patient found to have right LE DVT. Pt is not a candidate for further anticoagulation, so IVC filter was placed 5/12. Pt tolerated procedure well. Pt was revaluated by speech and was advanced to mechanical soft diet with nectar thick fluids at that time. He was evaluated by PT, OT and was able to stand and ambulate short distances with mod assistance. He was seen by Physiatry on the Stroke service and was found to be a good acute inpatient rehab candidate.      Today's subjective:  Patient seen and evaluated this morning at bedside with attending physician. Patient reported no new complaints. No acute events overnight.      CLOF:   PT: Amb 150ft RW supervision/ CG, bed mobility transfers CG    PHYSICAL EXAMINATION     General:[ x  ] NAD, resting comfortable in bed,   [   ] other:                                HEENT: [ x  ] NC/AT, EOMI, PERRL , Normal Conjunctivae,   [  ] other:   Cardio: [  x ] RRR, no murmur   [   ] other:                              Pulm: [ x  ] No Respiratory Distress,  Lungs CTAB,   [   ] other:                       Abdomen: [ x  ] ND/NT, Soft,   [   ] other:    : [ x ] NO MIR CATHETER, [   ] MIR CATHETER- no meatal tear, no discharge, [   ] other:                                            MSK: [ x  ] No joint swelling, Full ROM,   [  ] other:                                  Ext: [ x ]No C/C/E, No calf tenderness (known right DVT),   [   ]other:    Skin: [ x  ]intact,   [   ] other:                                                                   Neurological Examination:  Cognitive: [  x  ] AAO x 3   [    ]  other Right hemineglect, improving                                          Attention:  [    ] intact   [  x  ]  other      mild impairment                     Memory: [     ] intact    [   x ]  other   impaired  Mood/Affect:  [    ] wnl    [   x ]  other  flat affect                                                                        Communication:  [   ] Fluent,  No dysarthria,   [ x   ] other - brief answers with delayed responses.  CN II - XII  [  x  ] intact   [    ] other   Coordination:   [    ] FTN/HTS intact   [ x  ] other  limited exam. Slow response - improving                                                                      Sensory: [ x  ]Intact to light touch   [    ] other                                                                                        Tone:  [  x  ]  wnl,   [    ]  other    Motor   LEFT    UE: [   ] WNL.  [  x ] other: 4/5   RIGHT UE:  [   ] WNL.  [ x  ] other: 4/5    LEFT    LE:  [   ] WNL.  [ x  ] other: 3+/5   RIGHT LE:  [   ] WNL.  [  x ] other:  3+/5 range     Reflex:  [  x  ]  symmetric,  [    ]  other:  DTRs: [  x ]symmetric, [   ] other:  Coordination:   [    ] intact,   [    ] other:                                                                        Sensory: [  x  ] Intact to light touch,   [    ] other:      Vitals:  ============  T(F): 97.4 (01 Jun 2021 05:56), Max: 97.8 (31 May 2021 12:13)  HR: 77 (01 Jun 2021 05:56)  BP: 144/63 (01 Jun 2021 05:56)  RR: 18 (01 Jun 2021 05:56)  SpO2: 98% (01 Jun 2021 05:56) (98% - 99%)  temp max in last 48H T(F): , Max: 98.4 (05-30-21 @ 22:02)    =======================================================  Current Antibiotics:    Other medications:  amLODIPine   Tablet 5 milliGRAM(s) Oral at bedtime  aspirin enteric coated 81 milliGRAM(s) Oral daily  enoxaparin Injectable 40 milliGRAM(s) SubCutaneous daily  losartan 25 milliGRAM(s) Oral daily  pantoprazole   Suspension 40 milliGRAM(s) Oral daily  senna 2 Tablet(s) Oral at bedtime      =======================================================  Labs:                        11.7   5.84  )-----------( 214      ( 31 May 2021 06:55 )             37.5     05-31    136  |  99  |  14  ----------------------------<  96  4.8   |  30  |  0.8    Ca    9.3      31 May 2021 06:55  Mg     2.2     05-31    TPro  6.6  /  Alb  4.0  /  TBili  0.2  /  DBili  x   /  AST  21  /  ALT  18  /  AlkPhos  98  05-31      Creatinine, Serum: 0.8 mg/dL (05-31-21 @ 06:55)  Creatinine, Serum: 0.8 mg/dL (05-28-21 @ 07:25)        Ferritin, Serum: 158 ng/mL (05-17-21 @ 06:54)      WBC Count: 5.84 K/uL (05-31-21 @ 06:55)  WBC Count: 5.46 K/uL (05-28-21 @ 07:25)      COVID-19 PCR: NotDetec (05-12-21 @ 15:06)  COVID-19 PCR: NotDetec (05-11-21 @ 15:46)  COVID-19 PCR: NotDetec (05-03-21 @ 14:58)      Alkaline Phosphatase, Serum: 98 U/L (05-31-21 @ 06:55)  Alanine Aminotransferase (ALT/SGPT): 18 U/L (05-31-21 @ 06:55)  Aspartate Aminotransferase (AST/SGOT): 21 U/L (05-31-21 @ 06:55)  Bilirubin Total, Serum: 0.2 mg/dL (05-31-21 @ 06:55)                Assessment and Plan:   · Assessment	  ASSESSMENT/PLAN    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 Good acute rehab candidate. Right hemineglect or right sided inattention.   Patient requires at least 3hours, 5 days a week, of acute inpatient rehab including PT, OT, and speech.     # lethargy/ Abulia - Doing better again off Lexapro  -amantadine 100 BID, improving    # Left DVT in the external iliac, common femoral veins  -IVC filter placed 5/12 due to complication of hemorrhagic conversion of infarction after administration of TPA. Not an A/C candidate  -lovenox 40     # Dysphagia: Dysphagia 3 with nectar-thick liquid  feeds. Monitor intake.    # HTN : SBP goal 120s - 150s  - Stable  - norvasc 10  - lopressor 25 bid     #B-12 deficient:  -on cyanocobalamin injectable until 5/25/21    #Pain control:   - Tylenol PRN    -GI/Bowel Mgmt    pantoprazole 40 mg Oral daily    -Bladder management: condom cath     -Skin:  -No active issues at this time    -FEN: Monitor intake on current diet       Precautions / PROPHYLAXIS:    - Falls  - Ortho: Weight bearing status: all limbs clear  - DVT: enoxaparin Injectable 40 mg SubQ daily and IVC filter   - Seizure proph: Keppra   HPI:    86 year old right handed gentleman with no significant PMH presented to the ED with his granddaughter. Patient last known well at 11:30 AM. Patient found to be aphasic with right sided weakness at around noon.   Stroke code and code NI called on arrival. Patient with NIHSS 18. CTH without intracranial pathology. Patient given IV TPA at 13:23. He was hypertensive on presentation () and was given Labetalol 20 mg IVP and started on nicardipine drip in MICU  (03 May 2021 16:17). 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 No NSx intervention. Stability scan shows stable bleed. vEEG negative for subclinical seizures. Patient found to have right LE DVT. Pt is not a candidate for further anticoagulation, so IVC filter was placed 5/12. Pt tolerated procedure well. Pt was revaluated by speech and was advanced to mechanical soft diet with nectar thick fluids at that time. He was evaluated by PT, OT and was able to stand and ambulate short distances with mod assistance. He was seen by Physiatry on the Stroke service and was found to be a good acute inpatient rehab candidate.      Today's subjective:  Patient seen and evaluated this morning at bedside with attending physician. Patient reported no new complaints. No acute events overnight.      CLOF:   PT: Amb 150ft RW supervision/ CG, bed mobility transfers CG    PHYSICAL EXAMINATION     General:[ x  ] NAD, resting comfortable in bed,   [   ] other:                                HEENT: [ x  ] NC/AT, EOMI, PERRL , Normal Conjunctivae,   [  ] other:   Cardio: [  x ] RRR, no murmur   [   ] other:                              Pulm: [ x  ] No Respiratory Distress,  Lungs CTAB,   [   ] other:                       Abdomen: [ x  ] ND/NT, Soft,   [   ] other:    : [ x ] NO MIR CATHETER, [   ] MIR CATHETER- no meatal tear, no discharge, [   ] other:                                            MSK: [ x  ] No joint swelling, Full ROM,   [  ] other:                                  Ext: [ x ]No C/C/E, No calf tenderness (known right DVT),   [   ]other:    Skin: [ x  ]intact,   [   ] other:                                                                   Neurological Examination:  Cognitive: [  x  ] AAO x 3   [    ]  other Right hemineglect, improving                                          Attention:  [    ] intact   [  x  ]  other      mild impairment                     Memory: [     ] intact    [   x ]  other   impaired  Mood/Affect:  [    ] wnl    [   x ]  other  flat affect                                                                        Communication:  [   ] Fluent,  No dysarthria,   [ x   ] other - brief answers with delayed responses.  CN II - XII  [  x  ] intact   [    ] other   Coordination:   [    ] FTN/HTS intact   [ x  ] other  limited exam. Slow response - improving                                                                      Sensory: [ x  ]Intact to light touch   [    ] other                                                                                        Tone:  [  x  ]  wnl,   [    ]  other    Motor   LEFT    UE: [   ] WNL.  [  x ] other: 4/5   RIGHT UE:  [   ] WNL.  [ x  ] other: 4/5    LEFT    LE:  [   ] WNL.  [ x  ] other: 3+/5   RIGHT LE:  [   ] WNL.  [  x ] other:  3+/5 range     Reflex:  [  x  ]  symmetric,  [    ]  other:  DTRs: [  x ]symmetric, [   ] other:  Coordination:   [    ] intact,   [    ] other:                                                                        Sensory: [  x  ] Intact to light touch,   [    ] other:      Vitals:  ============  T(F): 97.4 (01 Jun 2021 05:56), Max: 97.8 (31 May 2021 12:13)  HR: 77 (01 Jun 2021 05:56)  BP: 144/63 (01 Jun 2021 05:56)  RR: 18 (01 Jun 2021 05:56)  SpO2: 98% (01 Jun 2021 05:56) (98% - 99%)  temp max in last 48H T(F): , Max: 98.4 (05-30-21 @ 22:02)    =======================================================  Current Antibiotics:    Other medications:  amLODIPine   Tablet 5 milliGRAM(s) Oral at bedtime  aspirin enteric coated 81 milliGRAM(s) Oral daily  enoxaparin Injectable 40 milliGRAM(s) SubCutaneous daily  losartan 25 milliGRAM(s) Oral daily  pantoprazole   Suspension 40 milliGRAM(s) Oral daily  senna 2 Tablet(s) Oral at bedtime      =======================================================  Labs:                        11.7   5.84  )-----------( 214      ( 31 May 2021 06:55 )             37.5     05-31    136  |  99  |  14  ----------------------------<  96  4.8   |  30  |  0.8    Ca    9.3      31 May 2021 06:55  Mg     2.2     05-31    TPro  6.6  /  Alb  4.0  /  TBili  0.2  /  DBili  x   /  AST  21  /  ALT  18  /  AlkPhos  98  05-31      Creatinine, Serum: 0.8 mg/dL (05-31-21 @ 06:55)  Creatinine, Serum: 0.8 mg/dL (05-28-21 @ 07:25)        Ferritin, Serum: 158 ng/mL (05-17-21 @ 06:54)      WBC Count: 5.84 K/uL (05-31-21 @ 06:55)  WBC Count: 5.46 K/uL (05-28-21 @ 07:25)      COVID-19 PCR: NotDetec (05-12-21 @ 15:06)  COVID-19 PCR: NotDetec (05-11-21 @ 15:46)  COVID-19 PCR: NotDetec (05-03-21 @ 14:58)      Alkaline Phosphatase, Serum: 98 U/L (05-31-21 @ 06:55)  Alanine Aminotransferase (ALT/SGPT): 18 U/L (05-31-21 @ 06:55)  Aspartate Aminotransferase (AST/SGOT): 21 U/L (05-31-21 @ 06:55)  Bilirubin Total, Serum: 0.2 mg/dL (05-31-21 @ 06:55)                Assessment and Plan:   · Assessment	  ASSESSMENT/PLAN    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 Good acute rehab candidate. Right hemineglect or right sided inattention.   Patient completed at least 3hours, 5 days a week, of acute inpatient rehab including PT, OT, and speech.     # lethargy/ Abulia - Doing better again off Lexapro  -amantadine 100 BID, improving    # Left DVT in the external iliac, common femoral veins  -IVC filter placed 5/12 due to complication of hemorrhagic conversion of infarction after administration of TPA. Not an A/C candidate  -lovenox 40     # Dysphagia: Dysphagia 3 with nectar-thick liquid  feeds. Monitor intake.    # HTN : SBP goal 120s - 150s  - Stable  - norvasc 10  - lopressor 25 bid     #B-12 deficient:  -on cyanocobalamin injectable until 5/25/21    #Pain control:   - Tylenol PRN    -GI/Bowel Mgmt    pantoprazole 40 mg Oral daily    -Bladder management: condom cath     -Skin:  -No active issues at this time    -FEN: Monitor intake on current diet       Precautions / PROPHYLAXIS:    - Falls  - Ortho: Weight bearing status: all limbs clear  - DVT: enoxaparin Injectable 40 mg SubQ daily and IVC filter   - Seizure proph: Keppra   HPI:    86 year old right handed gentleman with no significant PMH presented to the ED with his granddaughter. Patient last known well at 11:30 AM. Patient found to be aphasic with right sided weakness at around noon.   Stroke code and code NI called on arrival. Patient with NIHSS 18. CTH without intracranial pathology. Patient given IV TPA at 13:23. He was hypertensive on presentation () and was given Labetalol 20 mg IVP and started on nicardipine drip in MICU  (03 May 2021 16:17). 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 No NSx intervention. Stability scan shows stable bleed. vEEG negative for subclinical seizures. Patient found to have right LE DVT. Pt is not a candidate for further anticoagulation, so IVC filter was placed 5/12. Pt tolerated procedure well. Pt was revaluated by speech and was advanced to mechanical soft diet with nectar thick fluids at that time. He was evaluated by PT, OT and was able to stand and ambulate short distances with mod assistance. He was seen by Physiatry on the Stroke service and was found to be a good acute inpatient rehab candidate.      Today's subjective/ ROS:  Patient seen and evaluated this morning at bedside with attending physician. Patient reported no new complaints. No acute events overnight.                                                                 10 point ROS otherwise negative  CLOF:   PT: Amb 150ft RW supervision/ CG, bed mobility transfers CG    PHYSICAL EXAMINATION     General:[ x  ] NAD, resting comfortable in bed,   [   ] other:                                HEENT: [ x  ] NC/AT, EOMI, PERRL , Normal Conjunctivae,   [  ] other:   Cardio: [  x ] RRR, no murmur   [   ] other:                              Pulm: [ x  ] No Respiratory Distress,  Lungs CTAB,   [   ] other:                       Abdomen: [ x  ] ND/NT, Soft,   [   ] other:    : [ x ] NO MIR CATHETER, [   ] MIR CATHETER- no meatal tear, no discharge, [   ] other:                                            MSK: [ x  ] No joint swelling, Full ROM,   [  ] other:                                  Ext: [ x ]No C/C/E, No calf tenderness (known right DVT),   [   ]other:    Skin: [ x  ]intact,   [   ] other:                                                                   Neurological Examination:  Cognitive: [  x  ] AAO x 3   [    ]  other Right hemineglect, improving                                          Attention:  [    ] intact   [  x  ]  other      mild impairment                     Memory: [     ] intact    [   x ]  other   impaired  Mood/Affect:  [    ] wnl    [   x ]  other  flat affect                                                                        Communication:  [   ] Fluent,  No dysarthria,   [ x   ] other - brief answers with delayed responses.  CN II - XII  [  x  ] intact   [    ] other   Coordination:   [    ] FTN/HTS intact   [ x  ] other  limited exam. Slow response - improving                                                                      Sensory: [ x  ]Intact to light touch   [    ] other                                                                                        Tone:  [  x  ]  wnl,   [    ]  other    Motor   LEFT    UE: [   ] WNL.  [  x ] other: 4/5   RIGHT UE:  [   ] WNL.  [ x  ] other: 4/5    LEFT    LE:  [   ] WNL.  [ x  ] other: 3+/5   RIGHT LE:  [   ] WNL.  [  x ] other:  3+/5 range     Reflex:  [  x  ]  symmetric,  [    ]  other:  DTRs: [  x ]symmetric, [   ] other:  Coordination:   [    ] intact,   [    ] other:                                                                        Sensory: [  x  ] Intact to light touch,   [    ] other:      Vitals:  ============  T(F): 97.4 (01 Jun 2021 05:56), Max: 97.8 (31 May 2021 12:13)  HR: 77 (01 Jun 2021 05:56)  BP: 144/63 (01 Jun 2021 05:56)  RR: 18 (01 Jun 2021 05:56)  SpO2: 98% (01 Jun 2021 05:56) (98% - 99%)  temp max in last 48H T(F): , Max: 98.4 (05-30-21 @ 22:02)    =======================================================  MEDICATIONS  (STANDING):  amLODIPine   Tablet 5 milliGRAM(s) Oral at bedtime  aspirin enteric coated 81 milliGRAM(s) Oral daily  enoxaparin Injectable 40 milliGRAM(s) SubCutaneous daily  losartan 25 milliGRAM(s) Oral daily  pantoprazole   Suspension 40 milliGRAM(s) Oral daily  senna 2 Tablet(s) Oral at bedtime    MEDICATIONS  (PRN):  acetaminophen   Tablet .. 650 milliGRAM(s) Oral every 6 hours PRN Temp greater or equal to 38C (100.4F), Mild Pain (1 - 3)  bisacodyl Suppository 10 milliGRAM(s) Rectal daily PRN Constipation  magnesium hydroxide Suspension 30 milliLiter(s) Oral daily PRN Constipation  melatonin 3 milliGRAM(s) Oral at bedtime PRN Insomnia      =======================================================  Labs:                        11.7   5.84  )-----------( 214      ( 31 May 2021 06:55 )             37.5     05-31    136  |  99  |  14  ----------------------------<  96  4.8   |  30  |  0.8    Ca    9.3      31 May 2021 06:55  Mg     2.2     05-31    TPro  6.6  /  Alb  4.0  /  TBili  0.2  /  DBili  x   /  AST  21  /  ALT  18  /  AlkPhos  98  05-31      Creatinine, Serum: 0.8 mg/dL (05-31-21 @ 06:55)  Creatinine, Serum: 0.8 mg/dL (05-28-21 @ 07:25)        Ferritin, Serum: 158 ng/mL (05-17-21 @ 06:54)      WBC Count: 5.84 K/uL (05-31-21 @ 06:55)  WBC Count: 5.46 K/uL (05-28-21 @ 07:25)      COVID-19 PCR: NotDetec (05-12-21 @ 15:06)  COVID-19 PCR: NotDetec (05-11-21 @ 15:46)  COVID-19 PCR: NotDetec (05-03-21 @ 14:58)      Alkaline Phosphatase, Serum: 98 U/L (05-31-21 @ 06:55)  Alanine Aminotransferase (ALT/SGPT): 18 U/L (05-31-21 @ 06:55)  Aspartate Aminotransferase (AST/SGOT): 21 U/L (05-31-21 @ 06:55)  Bilirubin Total, Serum: 0.2 mg/dL (05-31-21 @ 06:55)

## 2021-06-01 NOTE — DISCHARGE NOTE PROVIDER - NSDCCPCAREPLAN_GEN_ALL_CORE_FT
PRINCIPAL DISCHARGE DIAGNOSIS  Diagnosis: Cerebrovascular accident (CVA)  Assessment and Plan of Treatment: You had a witnessed stroke at home on May 3, and since you came to the ER right away, you were given TPA (clot buster), which later caused hemorrhagic transformation. You were followed by the Medicine, Neurology and Neurosurgery teams while in the hospital. You received physical, occupational, speech and neuropsych therapy while on the Rehab medicine service. Please continue your diet and medications as prescribed, and follow up with your primary care provider (PCP) and Neurologist. You have an appointment at the medical clinic on Friday June 4, 2021 at 8:30 AM at [address] [place] [address] floor. PT/OT and Speech therapy will also continue at home.      SECONDARY DISCHARGE DIAGNOSES  Diagnosis: DVT (deep venous thrombosis)  Assessment and Plan of Treatment: You developed blood clots in your left leg, and because you cannot take blood-thinners due to the blood in your brain, the Vascular surgeon placed an IVC filter in your inferior vena cava (largest vein in your body that returns blood to your heart), in order to catch any blood clots that may break off from your leg and prevent the blood clots from traveling to your lungs. Please follow up with the Vascular doctor in one month.    Diagnosis: Hypertension  Assessment and Plan of Treatment: Continue to take your medications and follow diet as prescribed. Follow up with your PCP at the [facility] clinic regularly.    Diagnosis: Vitamin B12 deficiency  Assessment and Plan of Treatment: You received daily vitamin B12 injections for 7 days while in the hospital, due to low B12 level. Your last injection was on June 1, 2021. You should continue monthly injections; the next dose will be due on June 29; it can be given by your visiting nurse, or at the medical clinic.    Diagnosis: Borderline hypothyroidism  Assessment and Plan of Treatment: Your thyroid function test (TSH) was mildly elevated twice; first level ws 8.32 on 5/17 and repeat one week later was 9.08. Your PCP will follow up when you see him, and he will do further work-up to determine if you need to start medication.

## 2021-06-01 NOTE — PROGRESS NOTE ADULT - ATTENDING SUPERVISION STATEMENT
Resident

## 2021-06-02 ENCOUNTER — APPOINTMENT (OUTPATIENT)
Dept: CARE COORDINATION | Facility: HOME HEALTH | Age: 86
End: 2021-06-02
Payer: MEDICARE

## 2021-06-02 VITALS — RESPIRATION RATE: 16 BRPM

## 2021-06-02 DIAGNOSIS — I10 ESSENTIAL (PRIMARY) HYPERTENSION: ICD-10-CM

## 2021-06-02 DIAGNOSIS — K59.09 OTHER CONSTIPATION: ICD-10-CM

## 2021-06-02 DIAGNOSIS — Z86.73 PERSONAL HISTORY OF TRANSIENT ISCHEMIC ATTACK (TIA), AND CEREBRAL INFARCTION W/OUT RESIDUAL DEFICITS: ICD-10-CM

## 2021-06-02 PROCEDURE — 99349 HOME/RES VST EST MOD MDM 40: CPT | Mod: 95

## 2021-06-02 RX ORDER — SENNOSIDES 8.6 MG/1
8.6 CAPSULE, GELATIN COATED ORAL
Qty: 60 | Refills: 0 | Status: ACTIVE | COMMUNITY
Start: 2021-06-02 | End: 1900-01-01

## 2021-06-02 RX ORDER — DOCUSATE SODIUM 100 MG/1
100 CAPSULE ORAL 3 TIMES DAILY
Qty: 90 | Refills: 0 | Status: ACTIVE | COMMUNITY
Start: 2021-06-02 | End: 1900-01-01

## 2021-06-04 ENCOUNTER — LABORATORY RESULT (OUTPATIENT)
Age: 86
End: 2021-06-04

## 2021-06-04 ENCOUNTER — APPOINTMENT (OUTPATIENT)
Dept: INTERNAL MEDICINE | Facility: CLINIC | Age: 86
End: 2021-06-04
Payer: MEDICARE

## 2021-06-04 ENCOUNTER — OUTPATIENT (OUTPATIENT)
Dept: OUTPATIENT SERVICES | Facility: HOSPITAL | Age: 86
LOS: 1 days | Discharge: HOME | End: 2021-06-04

## 2021-06-04 VITALS — OXYGEN SATURATION: 99 %

## 2021-06-04 VITALS
RESPIRATION RATE: 20 BRPM | WEIGHT: 110.38 LBS | HEIGHT: 64 IN | DIASTOLIC BLOOD PRESSURE: 76 MMHG | HEART RATE: 76 BPM | SYSTOLIC BLOOD PRESSURE: 136 MMHG | TEMPERATURE: 97.1 F | BODY MASS INDEX: 18.85 KG/M2

## 2021-06-04 PROBLEM — I10 HTN (HYPERTENSION): Status: ACTIVE | Noted: 2021-06-04

## 2021-06-04 PROBLEM — Z86.73 STATUS POST CVA: Status: ACTIVE | Noted: 2021-06-04

## 2021-06-04 PROBLEM — K59.09 CHRONIC CONSTIPATION: Status: ACTIVE | Noted: 2021-06-02

## 2021-06-04 PROCEDURE — 99203 OFFICE O/P NEW LOW 30 MIN: CPT | Mod: GC

## 2021-06-04 RX ORDER — CYANOCOBALAMIN 1000 UG/ML
1000 INJECTION INTRAMUSCULAR; SUBCUTANEOUS
Qty: 6 | Refills: 0 | Status: ACTIVE | COMMUNITY
Start: 2021-06-04 | End: 1900-01-01

## 2021-06-04 RX ORDER — LOSARTAN POTASSIUM 25 MG/1
25 TABLET, FILM COATED ORAL DAILY
Qty: 90 | Refills: 1 | Status: ACTIVE | COMMUNITY
Start: 2021-06-04 | End: 1900-01-01

## 2021-06-04 RX ORDER — AMLODIPINE BESYLATE 5 MG/1
5 TABLET ORAL DAILY
Qty: 90 | Refills: 1 | Status: ACTIVE | COMMUNITY
Start: 2021-06-04 | End: 1900-01-01

## 2021-06-04 RX ORDER — ATORVASTATIN CALCIUM 80 MG/1
80 TABLET, FILM COATED ORAL
Qty: 90 | Refills: 0 | Status: ACTIVE | COMMUNITY
Start: 2021-06-04 | End: 1900-01-01

## 2021-06-04 RX ORDER — ASPIRIN ENTERIC COATED TABLETS 81 MG 81 MG/1
81 TABLET, DELAYED RELEASE ORAL
Qty: 30 | Refills: 4 | Status: ACTIVE | COMMUNITY
Start: 2021-06-04 | End: 1900-01-01

## 2021-06-04 NOTE — PLAN
[FreeTextEntry1] : s/p CVA\par follow up wth neurology\par participate in physical therapy, occupational therapy\par c/w amLODIPine 5 mg oral tablet: 1 tab daily\par c/w aspirin 81 mg oral delayed release tablet: 1 tab daily\par c/w atorvastatin 80 mg oral tablet: 1 tab  orally once a day  \par c/w losartan 25 mg oral tablet: 1 tab(s) orally once a day \par \par HTN\par c/wamlodipine\par c/w losartan\par follow up with PCP, cardiologist\par constipation\par start docusate 100mg TId \par start Senna 8.6 mg 1 tab at night\par report new or worsenig s/s\par increase fiber\par adequate oral intake\par \par , \par

## 2021-06-04 NOTE — ASSESSMENT
[FreeTextEntry1] : 86 year old Male with PMH of gout presented to the clinic post discharge from the [facility] MICU for thrombotic stroke, s/p TPA, complicated by ICH, further complicated by acute left LE DVT, s/p IVC filter.\par \par # thrombotic stroke, s/p TPA, with hemorrhagic transformation\par - referral to Neurology\par \par # Left DVT in the external iliac, common femoral veins\par -s/p IVC filter placed 5/12\par \par # HTN \par - Stable\par - c/w norvasc 5mg po q hs and losartan 25 mg daily\par \par #B 12 deficiency\par - pt is vegetarian\par - s/p cyanocobalamin injectable x 7 days in hospital\par - c/w cyanocobalamin injection Q month\par - next injection can be given by visiting nurse \par - f/u Heme\par \par # borderline hypothyroid\par  - TSH 5/17 = 8.32, repeat 5/24 = 9.08\par - repeat TSH, T4, T3\par \par HCM:\par - repeat lipid panel, TSH, T3, T4, A1C, CBC, CMP\par - follow up in 3 months and PRN

## 2021-06-04 NOTE — COUNSELING
[Fall prevention counseling provided] : Fall prevention counseling provided [de-identified] : [employer] TCM STARS teaching: Patient advised to continue with anticoagulation regimen as directed. Patient advised to continue with incentive spirometer/cough and deep breathing techniques. Medication education discussed in full detail with + teach back. Safety precautions discussed including use of DME’s for ambulating and ADLs as applicable. Patient to follow up with PCP/Neurologist as directed.  Patient educated on s/s of CVA with + teach back. Contact information given, patient advised to call with any questions/concerns\par \par

## 2021-06-04 NOTE — HEALTH RISK ASSESSMENT
[No] : No [With Family] : lives with family [] : No [Reports changes in hearing] : Reports no changes in hearing [Reports changes in vision] : Reports no changes in vision

## 2021-06-04 NOTE — PHYSICAL EXAM
[No Acute Distress] : no acute distress [Well Nourished] : well nourished [Well Developed] : well developed [Normal Sclera/Conjunctiva] : normal sclera/conjunctiva [EOMI] : extraocular movements intact [Normal Outer Ear/Nose] : the outer ears and nose were normal in appearance [Normal Oropharynx] : the oropharynx was normal [Supple] : supple [Thyroid Normal, No Nodules] : the thyroid was normal and there were no nodules present [No Respiratory Distress] : no respiratory distress  [No Accessory Muscle Use] : no accessory muscle use [Clear to Auscultation] : lungs were clear to auscultation bilaterally [Normal Rate] : normal rate  [Regular Rhythm] : with a regular rhythm [Normal S1, S2] : normal S1 and S2 [Pedal Pulses Present] : the pedal pulses are present [No Edema] : there was no peripheral edema [No Extremity Clubbing/Cyanosis] : no extremity clubbing/cyanosis [Soft] : abdomen soft [Non Tender] : non-tender [Non-distended] : non-distended [No Masses] : no abdominal mass palpated [Normal Bowel Sounds] : normal bowel sounds [No Joint Swelling] : no joint swelling [Grossly Normal Strength/Tone] : grossly normal strength/tone [No Rash] : no rash [Normal Affect] : the affect was normal [Normal Insight/Judgement] : insight and judgment were intact

## 2021-06-04 NOTE — REVIEW OF SYSTEMS
[Muscle Weakness] : muscle weakness [Unsteady Walk] : ataxia [Negative] : Integumentary [de-identified] : aphagia

## 2021-06-04 NOTE — REVIEW OF SYSTEMS
[Constipation] : constipation [Fever] : no fever [Chills] : no chills [Fatigue] : no fatigue [Recent Change In Weight] : ~T no recent weight change [Discharge] : no discharge [Pain] : no pain [Earache] : no earache [Hearing Loss] : no hearing loss [Nasal Discharge] : no nasal discharge [Chest Pain] : no chest pain [Palpitations] : no palpitations [Lower Ext Edema] : no lower extremity edema [Shortness Of Breath] : no shortness of breath [Wheezing] : no wheezing [Cough] : no cough [Abdominal Pain] : no abdominal pain [Nausea] : no nausea [Diarrhea] : no diarrhea [Vomiting] : no vomiting [Dysuria] : no dysuria [Incontinence] : no incontinence [Hesitancy] : no hesitancy [Joint Pain] : no joint pain [Back Pain] : no back pain [Headache] : no headache [Dizziness] : no dizziness [Depression] : no depression

## 2021-06-04 NOTE — HISTORY OF PRESENT ILLNESS
[Home] : at home, [unfilled] , at the time of the visit. [Other Location: e.g. Home (Enter Location, City,State)___] : at [unfilled] [Family Member] : family member [Verbal consent obtained from patient] : the patient, [unfilled] [FreeTextEntry1] : follow up post hospitalization [de-identified] : This is an 86 year old Male enrolled in [facility] with a dx of CVA.   PMH : gout. pt was hospitalized from 5/13/21- 6/1/21.\par MRI and repeat CT were consistent with bifrontal bilateral hemorrhages, which are likely secondary to hemorrhagic conversion of infarction. He was treated for newly diagnosed HTN and was started on Keppra for seizure prophylaxis, which was d/c after negative EEG. At time of televisit pt was awake, alert and oriented with daughter translating, he responds mostly by nodding,   pt has experienced aphasia s/p CVA and weakness. pt c/o incomplete BM x 2 days, senna and colace ordered.  HCS, OT and PT in progress.  He has follow up with PCP tomorrow.\par

## 2021-06-04 NOTE — HISTORY OF PRESENT ILLNESS
[Post-hospitalization from ___ Hospital] : Post-hospitalization from [unfilled] Hospital [Admitted on: ___] : The patient was admitted on [unfilled] [Discharged on ___] : discharged on [unfilled] [FreeTextEntry2] : 86 year old Male with PMH of gout [facility] (NIHSS 18 at hospital admission). CTH initially showed no intracranial pathology. Patient given IV TPA.CT scan post TPA showed ICH.\par MRI and repeat CT were consistent with bifrontal bilateral hemorrhages, which are likely 2/2 to hemorrhagic conversion of infarction after administration of TPA; also amyloid angiopathy. No Neurosurgical intervention was needed.\par Pt was treated for newly diagnosed HTN, which was treated with nicardipine drip, which was later transition to the oral medications. Pt was on Keppra for seizure prophylaxis, however vEEG did not shoe any seizures, and Keppra was discontinued. \par Admission was later complicated by DVTs in L external iliac and L common femoral veins. Pt had IVC filter placed on 5/12. Pt was discharged to rehab unit.\par Pt is accompanied by the granddaughters Lin (in person)and Shantel (via facetime). \par Currently, pt was is walking with walker( prior to hospital admission was independent with ADLs). Pt lives at home. Pt still had residual aphagia, and mildly confused in the mornings and night time.\par Otherwise, pt has no active complaints.\par \par

## 2021-06-04 NOTE — PHYSICAL EXAM
[No Respiratory Distress] : no respiratory distress  [No Accessory Muscle Use] : no accessory muscle use [No Edema] : there was no peripheral edema [Soft] : abdomen soft [Non Tender] : non-tender [Non-distended] : non-distended [Normal] : normal gait, coordination grossly intact, no focal deficits and deep tendon reflexes were 2+ and symmetric [de-identified] : using walker

## 2021-06-05 LAB
ALBUMIN SERPL ELPH-MCNC: 4.1 G/DL
ALP BLD-CCNC: 96 U/L
ALT SERPL-CCNC: 21 U/L
ANION GAP SERPL CALC-SCNC: 11 MMOL/L
AST SERPL-CCNC: 24 U/L
BASOPHILS # BLD AUTO: 0.02 K/UL
BASOPHILS NFR BLD AUTO: 0.3 %
BILIRUB SERPL-MCNC: 0.3 MG/DL
BUN SERPL-MCNC: 10 MG/DL
CALCIUM SERPL-MCNC: 9.5 MG/DL
CHLORIDE SERPL-SCNC: 100 MMOL/L
CHOLEST SERPL-MCNC: 177 MG/DL
CO2 SERPL-SCNC: 24 MMOL/L
CREAT SERPL-MCNC: 0.9 MG/DL
EOSINOPHIL # BLD AUTO: 0.2 K/UL
EOSINOPHIL NFR BLD AUTO: 3.1 %
ESTIMATED AVERAGE GLUCOSE: 123 MG/DL
GLUCOSE SERPL-MCNC: 84 MG/DL
HBA1C MFR BLD HPLC: 5.9 %
HCT VFR BLD CALC: 38.5 %
HDLC SERPL-MCNC: 49 MG/DL
HGB BLD-MCNC: 11.6 G/DL
IMM GRANULOCYTES NFR BLD AUTO: 0.2 %
LDLC SERPL CALC-MCNC: 110 MG/DL
LYMPHOCYTES # BLD AUTO: 1.71 K/UL
LYMPHOCYTES NFR BLD AUTO: 26.8 %
MAN DIFF?: NORMAL
MCHC RBC-ENTMCNC: 25.7 PG
MCHC RBC-ENTMCNC: 30.1 G/DL
MCV RBC AUTO: 85.2 FL
MONOCYTES # BLD AUTO: 0.55 K/UL
MONOCYTES NFR BLD AUTO: 8.6 %
NEUTROPHILS # BLD AUTO: 3.88 K/UL
NEUTROPHILS NFR BLD AUTO: 61 %
NONHDLC SERPL-MCNC: 128 MG/DL
PLATELET # BLD AUTO: 169 K/UL
POTASSIUM SERPL-SCNC: 4.9 MMOL/L
PROT SERPL-MCNC: 7.3 G/DL
RBC # BLD: 4.52 M/UL
RBC # FLD: 18.2 %
SODIUM SERPL-SCNC: 135 MMOL/L
T3FREE SERPL-MCNC: 2.58 PG/ML
TRIGL SERPL-MCNC: 123 MG/DL
TSH SERPL-ACNC: 6.4 UIU/ML
WBC # FLD AUTO: 6.37 K/UL

## 2021-06-07 DIAGNOSIS — I82.409 ACUTE EMBOLISM AND THROMBOSIS OF UNSPECIFIED DEEP VEINS OF UNSPECIFIED LOWER EXTREMITY: ICD-10-CM

## 2021-06-07 DIAGNOSIS — E53.8 DEFICIENCY OF OTHER SPECIFIED B GROUP VITAMINS: ICD-10-CM

## 2021-06-07 DIAGNOSIS — I63.9 CEREBRAL INFARCTION, UNSPECIFIED: ICD-10-CM

## 2021-06-07 DIAGNOSIS — I10 ESSENTIAL (PRIMARY) HYPERTENSION: ICD-10-CM

## 2021-06-09 ENCOUNTER — APPOINTMENT (OUTPATIENT)
Dept: INTERNAL MEDICINE | Facility: CLINIC | Age: 86
End: 2021-06-09

## 2021-06-11 DIAGNOSIS — Z95.828 PRESENCE OF OTHER VASCULAR IMPLANTS AND GRAFTS: ICD-10-CM

## 2021-06-11 DIAGNOSIS — E53.8 DEFICIENCY OF OTHER SPECIFIED B GROUP VITAMINS: ICD-10-CM

## 2021-06-11 DIAGNOSIS — R68.89 OTHER GENERAL SYMPTOMS AND SIGNS: ICD-10-CM

## 2021-06-11 DIAGNOSIS — I69.198 OTHER SEQUELAE OF NONTRAUMATIC INTRACEREBRAL HEMORRHAGE: ICD-10-CM

## 2021-06-11 DIAGNOSIS — I69.120 APHASIA FOLLOWING NONTRAUMATIC INTRACEREBRAL HEMORRHAGE: ICD-10-CM

## 2021-06-11 DIAGNOSIS — E87.1 HYPO-OSMOLALITY AND HYPONATREMIA: ICD-10-CM

## 2021-06-11 DIAGNOSIS — R79.89 OTHER SPECIFIED ABNORMAL FINDINGS OF BLOOD CHEMISTRY: ICD-10-CM

## 2021-06-11 DIAGNOSIS — I82.422 ACUTE EMBOLISM AND THROMBOSIS OF LEFT ILIAC VEIN: ICD-10-CM

## 2021-06-11 DIAGNOSIS — R13.10 DYSPHAGIA, UNSPECIFIED: ICD-10-CM

## 2021-06-11 DIAGNOSIS — I69.151 HEMIPLEGIA AND HEMIPARESIS FOLLOWING NONTRAUMATIC INTRACEREBRAL HEMORRHAGE AFFECTING RIGHT DOMINANT SIDE: ICD-10-CM

## 2021-06-11 DIAGNOSIS — I69.191 DYSPHAGIA FOLLOWING NONTRAUMATIC INTRACEREBRAL HEMORRHAGE: ICD-10-CM

## 2021-06-11 DIAGNOSIS — I82.412 ACUTE EMBOLISM AND THROMBOSIS OF LEFT FEMORAL VEIN: ICD-10-CM

## 2021-06-11 DIAGNOSIS — M1A.9XX0 CHRONIC GOUT, UNSPECIFIED, WITHOUT TOPHUS (TOPHI): ICD-10-CM

## 2021-06-11 DIAGNOSIS — M10.9 GOUT, UNSPECIFIED: ICD-10-CM

## 2021-06-11 DIAGNOSIS — I10 ESSENTIAL (PRIMARY) HYPERTENSION: ICD-10-CM

## 2021-06-11 DIAGNOSIS — I69.118 OTHER SYMPTOMS AND SIGNS INVOLVING COGNITIVE FUNCTIONS FOLLOWING NONTRAUMATIC INTRACEREBRAL HEMORRHAGE: ICD-10-CM

## 2021-06-11 DIAGNOSIS — Z92.29 PERSONAL HISTORY OF OTHER DRUG THERAPY: ICD-10-CM

## 2021-06-11 DIAGNOSIS — E03.9 HYPOTHYROIDISM, UNSPECIFIED: ICD-10-CM

## 2021-06-11 DIAGNOSIS — E44.0 MODERATE PROTEIN-CALORIE MALNUTRITION: ICD-10-CM

## 2021-06-11 DIAGNOSIS — I69.121: ICD-10-CM

## 2021-07-06 ENCOUNTER — TRANSCRIPTION ENCOUNTER (OUTPATIENT)
Age: 86
End: 2021-07-06

## 2021-09-07 ENCOUNTER — APPOINTMENT (OUTPATIENT)
Dept: NEUROLOGY | Facility: CLINIC | Age: 86
End: 2021-09-07

## 2022-09-19 NOTE — PROGRESS NOTE ADULT - PROVIDER SPECIALTY LIST ADULT
Psychology
Rehab Medicine
Rehab Medicine
Physiatry
Psychology
Rehab Medicine
Rehab Medicine
Physiatry
Psychology
Rehab Medicine
Physiatry
Psychology
Rehab Medicine
Additional Notes: Consider patch testing
Detail Level: Detailed
Detail Level: Simple
Additional Notes: Patient consent was obtained to proceed with the visit and recommended plan of care after discussion of all risks and benefits, including the risks of COVID-19 exposure.

## 2023-05-15 NOTE — PROGRESS NOTE ADULT - REASON FOR ADMISSION
Bilateral CVA with right hemiparesis, aphasia
CVA
stroke
Stroke
cva
stroke
CVA
bilateral CVA with right hemiparesis, aphasia
stroke
stroke
CVA
stroke
stroke
Bilateral CVA with right hemiparesis, aphasia
cva / lizz ICH
CVA
stroke
CVA
Tranexamic Acid Pregnancy And Lactation Text: It is unknown if this medication is safe during pregnancy or breast feeding.

## 2024-02-22 NOTE — PRE-ANESTHESIA EVALUATION ADULT - NSANTHDIETYNSD_GEN_ALL_CORE
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 REVIEW OF SYSTEMS:  No fevers or chills    PHYSICAL EXAMINATION:  There were no vitals filed for this visit. General:  well-appearing, oriented, no acute distress  Mood/affect:  normal  Left upper extremity:  left volar wrist with red keratotic papule    ASSESSMENT/PLAN:  1. Neoplasm - left volar wrist  Discussed concern for skin cancer and recommended a biopsy to confirm diagnosis  Briefly reviewed treatment options including electrodesiccation and curettage, surgical excision, and Mohs micrographic surgery  Photo taken  Shave biopsy today    SHAVE BIOPSY PROCEDURE NOTE  Location: left volar wrist  Pre-op Diagnosis: SKI vs SCC    After discussion of risks including bleeding, scarring, and infection, informed consent was obtained. The designated area was prepped with Hibiclens and anesthesia was obtained with 1 mL of 1% lidocaine with 1:100,000 epinephrine. A shave biopsy using a #15 blade was performed to obtain the superficial layers of the lesion. Hemostasis was obtained with pressure and aluminum chloride. Wound care instructions were provided in verbal and written form. The specimen was sent for routine histopathological analysis.       Patient will follow-up pending biopsy/lab results Yes

## 2025-04-10 NOTE — PROGRESS NOTE ADULT - NUTRITIONAL ASSESSMENT
I have discussed and recommended the following surgical procedure(s):     Doctor: Dr. Vasquez  Surgery scheduling requirements include:  Procedure: Lap nir no grams   CPT:69481   DX:51.23    Comments for operating room staff: None  Facility: [facility]  Admission Type: Outpatient Surgery  Time Needed: 60 min  Anesthesia: General  Surgical Assist: yes,   Special equipment: None    Preoperative History and Physical: Schedule pre-op with PCP  Preoperative labs: None  Schedule postoperative appointment: two weeks    Patient Prep Instructions: No bowel prep needed  Additional Instructions:      Provider/Patient week preference: per schedule    Hibiclens Given: Yes  Hibiclens charged: Yes  Service to added: Yes    Is this an unplanned return to the O.R. within 30 days  No    This patient has been assessed with a concern for Malnutrition and has been determined to have a diagnosis/diagnoses of Moderate protein-calorie malnutrition.    This patient is being managed with:   Diet Dysphagia 2 Mechanical Soft-Nectar Consistency Fluid-  Lacto Veg (Accepts Milk & Milk Products)     Qty per Day:  VANILLA FLAVOR     Qty per Day:  w/ THICKENER PACKETS  Supplement Feeding Modality:  Oral  Ensure Enlive Servings Per Day:  1       Frequency:  Two Times a day  Entered: May 14 2021 11:35AM